# Patient Record
Sex: FEMALE | Race: WHITE | NOT HISPANIC OR LATINO | Employment: UNEMPLOYED | URBAN - METROPOLITAN AREA
[De-identification: names, ages, dates, MRNs, and addresses within clinical notes are randomized per-mention and may not be internally consistent; named-entity substitution may affect disease eponyms.]

---

## 2016-10-25 LAB — HBA1C MFR BLD: 9.3 % (ref 0–5.6)

## 2017-01-04 ENCOUNTER — COMMUNICATION - HEALTHEAST (OUTPATIENT)
Dept: SCHEDULING | Facility: CLINIC | Age: 42
End: 2017-01-04

## 2017-01-04 DIAGNOSIS — E78.00 PURE HYPERCHOLESTEROLEMIA: ICD-10-CM

## 2017-01-04 DIAGNOSIS — I10 HYPERTENSION: ICD-10-CM

## 2017-01-16 ENCOUNTER — RECORDS - HEALTHEAST (OUTPATIENT)
Dept: ADMINISTRATIVE | Facility: OTHER | Age: 42
End: 2017-01-16

## 2017-01-19 ENCOUNTER — RECORDS - HEALTHEAST (OUTPATIENT)
Dept: ADMINISTRATIVE | Facility: OTHER | Age: 42
End: 2017-01-19

## 2017-01-22 ENCOUNTER — RECORDS - HEALTHEAST (OUTPATIENT)
Dept: ADMINISTRATIVE | Facility: OTHER | Age: 42
End: 2017-01-22

## 2017-01-28 ENCOUNTER — COMMUNICATION - HEALTHEAST (OUTPATIENT)
Dept: FAMILY MEDICINE | Facility: CLINIC | Age: 42
End: 2017-01-28

## 2017-02-03 ENCOUNTER — COMMUNICATION - HEALTHEAST (OUTPATIENT)
Dept: FAMILY MEDICINE | Facility: CLINIC | Age: 42
End: 2017-02-03

## 2017-02-04 ENCOUNTER — COMMUNICATION - HEALTHEAST (OUTPATIENT)
Dept: SCHEDULING | Facility: CLINIC | Age: 42
End: 2017-02-04

## 2017-02-05 ENCOUNTER — RECORDS - HEALTHEAST (OUTPATIENT)
Dept: ADMINISTRATIVE | Facility: OTHER | Age: 42
End: 2017-02-05

## 2017-02-06 ENCOUNTER — COMMUNICATION - HEALTHEAST (OUTPATIENT)
Dept: FAMILY MEDICINE | Facility: CLINIC | Age: 42
End: 2017-02-06

## 2017-02-07 ENCOUNTER — COMMUNICATION - HEALTHEAST (OUTPATIENT)
Dept: FAMILY MEDICINE | Facility: CLINIC | Age: 42
End: 2017-02-07

## 2017-02-15 ENCOUNTER — OFFICE VISIT - HEALTHEAST (OUTPATIENT)
Dept: FAMILY MEDICINE | Facility: CLINIC | Age: 42
End: 2017-02-15

## 2017-02-15 DIAGNOSIS — E11.8 TYPE 2 DIABETES MELLITUS WITH COMPLICATION, WITH LONG-TERM CURRENT USE OF INSULIN (H): ICD-10-CM

## 2017-02-15 DIAGNOSIS — J30.9 ALLERGIC RHINITIS: ICD-10-CM

## 2017-02-15 DIAGNOSIS — M54.50 CHRONIC MIDLINE LOW BACK PAIN WITHOUT SCIATICA: ICD-10-CM

## 2017-02-15 DIAGNOSIS — F33.0 MAJOR DEPRESSIVE DISORDER, RECURRENT EPISODE, MILD (H): ICD-10-CM

## 2017-02-15 DIAGNOSIS — M17.0 OSTEOARTHRITIS OF BOTH KNEES, UNSPECIFIED OSTEOARTHRITIS TYPE: ICD-10-CM

## 2017-02-15 DIAGNOSIS — Z30.8 ENCOUNTER FOR OTHER CONTRACEPTIVE MANAGEMENT: ICD-10-CM

## 2017-02-15 DIAGNOSIS — Z79.4 TYPE 2 DIABETES MELLITUS WITH COMPLICATION, WITH LONG-TERM CURRENT USE OF INSULIN (H): ICD-10-CM

## 2017-02-15 DIAGNOSIS — E55.9 VITAMIN D DEFICIENCY: ICD-10-CM

## 2017-02-15 DIAGNOSIS — E66.01 MORBID OBESITY (H): ICD-10-CM

## 2017-02-15 DIAGNOSIS — G89.29 CHRONIC MIDLINE LOW BACK PAIN WITHOUT SCIATICA: ICD-10-CM

## 2017-02-15 DIAGNOSIS — G25.81 RESTLESS LEGS SYNDROME (RLS): ICD-10-CM

## 2017-02-15 LAB
HBA1C MFR BLD: 7.2 % (ref 3.5–6)
LDLC SERPL CALC-MCNC: 59 MG/DL

## 2017-02-16 ENCOUNTER — COMMUNICATION - HEALTHEAST (OUTPATIENT)
Dept: FAMILY MEDICINE | Facility: CLINIC | Age: 42
End: 2017-02-16

## 2017-02-17 ENCOUNTER — COMMUNICATION - HEALTHEAST (OUTPATIENT)
Dept: NURSING | Facility: CLINIC | Age: 42
End: 2017-02-17

## 2017-02-20 ENCOUNTER — COMMUNICATION - HEALTHEAST (OUTPATIENT)
Dept: FAMILY MEDICINE | Facility: CLINIC | Age: 42
End: 2017-02-20

## 2017-02-20 DIAGNOSIS — L50.9 URTICARIA, UNSPECIFIED: ICD-10-CM

## 2017-02-20 DIAGNOSIS — L28.0 LICHENIFICATION AND LICHEN SIMPLEX CHRONICUS: ICD-10-CM

## 2017-02-21 ENCOUNTER — COMMUNICATION - HEALTHEAST (OUTPATIENT)
Dept: FAMILY MEDICINE | Facility: CLINIC | Age: 42
End: 2017-02-21

## 2017-02-21 ENCOUNTER — RECORDS - HEALTHEAST (OUTPATIENT)
Dept: ADMINISTRATIVE | Facility: OTHER | Age: 42
End: 2017-02-21

## 2017-02-22 ENCOUNTER — COMMUNICATION - HEALTHEAST (OUTPATIENT)
Dept: FAMILY MEDICINE | Facility: CLINIC | Age: 42
End: 2017-02-22

## 2017-02-28 ENCOUNTER — RECORDS - HEALTHEAST (OUTPATIENT)
Dept: ADMINISTRATIVE | Facility: OTHER | Age: 42
End: 2017-02-28

## 2017-03-01 ENCOUNTER — COMMUNICATION - HEALTHEAST (OUTPATIENT)
Dept: FAMILY MEDICINE | Facility: CLINIC | Age: 42
End: 2017-03-01

## 2017-03-06 ENCOUNTER — COMMUNICATION - HEALTHEAST (OUTPATIENT)
Dept: FAMILY MEDICINE | Facility: CLINIC | Age: 42
End: 2017-03-06

## 2017-03-06 DIAGNOSIS — F33.1 MAJOR DEPRESSIVE DISORDER, RECURRENT EPISODE, MODERATE (H): ICD-10-CM

## 2017-03-06 DIAGNOSIS — G25.81 RESTLESS LEGS: ICD-10-CM

## 2017-03-20 ENCOUNTER — COMMUNICATION - HEALTHEAST (OUTPATIENT)
Dept: FAMILY MEDICINE | Facility: CLINIC | Age: 42
End: 2017-03-20

## 2017-03-20 DIAGNOSIS — E11.8 TYPE 2 DIABETES MELLITUS WITH COMPLICATION, WITH LONG-TERM CURRENT USE OF INSULIN (H): ICD-10-CM

## 2017-03-20 DIAGNOSIS — Z79.4 TYPE 2 DIABETES MELLITUS WITH COMPLICATION, WITH LONG-TERM CURRENT USE OF INSULIN (H): ICD-10-CM

## 2017-03-23 ENCOUNTER — COMMUNICATION - HEALTHEAST (OUTPATIENT)
Dept: FAMILY MEDICINE | Facility: CLINIC | Age: 42
End: 2017-03-23

## 2017-03-23 DIAGNOSIS — F33.0 MAJOR DEPRESSIVE DISORDER, RECURRENT EPISODE, MILD (H): ICD-10-CM

## 2017-03-29 ENCOUNTER — COMMUNICATION - HEALTHEAST (OUTPATIENT)
Dept: FAMILY MEDICINE | Facility: CLINIC | Age: 42
End: 2017-03-29

## 2017-03-29 ENCOUNTER — RECORDS - HEALTHEAST (OUTPATIENT)
Dept: ADMINISTRATIVE | Facility: OTHER | Age: 42
End: 2017-03-29

## 2017-03-29 ENCOUNTER — OFFICE VISIT - HEALTHEAST (OUTPATIENT)
Dept: FAMILY MEDICINE | Facility: CLINIC | Age: 42
End: 2017-03-29

## 2017-03-29 DIAGNOSIS — Z79.4 TYPE 2 DIABETES MELLITUS WITH COMPLICATION, WITH LONG-TERM CURRENT USE OF INSULIN (H): ICD-10-CM

## 2017-03-29 DIAGNOSIS — I87.8 VENOUS STASIS: ICD-10-CM

## 2017-03-29 DIAGNOSIS — E66.01 MORBID OBESITY WITH BMI OF 45.0-49.9, ADULT (H): ICD-10-CM

## 2017-03-29 DIAGNOSIS — G25.81 RESTLESS LEGS SYNDROME (RLS): ICD-10-CM

## 2017-03-29 DIAGNOSIS — F33.0 MAJOR DEPRESSIVE DISORDER, RECURRENT EPISODE, MILD (H): ICD-10-CM

## 2017-03-29 DIAGNOSIS — E11.8 TYPE 2 DIABETES MELLITUS WITH COMPLICATION, WITH LONG-TERM CURRENT USE OF INSULIN (H): ICD-10-CM

## 2017-04-01 ENCOUNTER — COMMUNICATION - HEALTHEAST (OUTPATIENT)
Dept: FAMILY MEDICINE | Facility: CLINIC | Age: 42
End: 2017-04-01

## 2017-04-08 ENCOUNTER — COMMUNICATION - HEALTHEAST (OUTPATIENT)
Dept: SCHEDULING | Facility: CLINIC | Age: 42
End: 2017-04-08

## 2017-04-08 DIAGNOSIS — E11.8 TYPE 2 DIABETES MELLITUS WITH COMPLICATION, WITH LONG-TERM CURRENT USE OF INSULIN (H): ICD-10-CM

## 2017-04-08 DIAGNOSIS — Z79.4 TYPE 2 DIABETES MELLITUS WITH COMPLICATION, WITH LONG-TERM CURRENT USE OF INSULIN (H): ICD-10-CM

## 2017-04-08 DIAGNOSIS — F33.0 MAJOR DEPRESSIVE DISORDER, RECURRENT EPISODE, MILD (H): ICD-10-CM

## 2017-04-09 ENCOUNTER — COMMUNICATION - HEALTHEAST (OUTPATIENT)
Dept: FAMILY MEDICINE | Facility: CLINIC | Age: 42
End: 2017-04-09

## 2017-05-17 ENCOUNTER — RECORDS - HEALTHEAST (OUTPATIENT)
Dept: ADMINISTRATIVE | Facility: OTHER | Age: 42
End: 2017-05-17

## 2017-05-18 ENCOUNTER — RECORDS - HEALTHEAST (OUTPATIENT)
Dept: ADMINISTRATIVE | Facility: OTHER | Age: 42
End: 2017-05-18

## 2017-05-21 ENCOUNTER — COMMUNICATION - HEALTHEAST (OUTPATIENT)
Dept: FAMILY MEDICINE | Facility: CLINIC | Age: 42
End: 2017-05-21

## 2017-05-21 DIAGNOSIS — E11.8 TYPE 2 DIABETES MELLITUS WITH COMPLICATION, WITH LONG-TERM CURRENT USE OF INSULIN (H): ICD-10-CM

## 2017-05-21 DIAGNOSIS — Z79.4 TYPE 2 DIABETES MELLITUS WITH COMPLICATION, WITH LONG-TERM CURRENT USE OF INSULIN (H): ICD-10-CM

## 2017-05-21 DIAGNOSIS — G25.81 RESTLESS LEGS SYNDROME (RLS): ICD-10-CM

## 2017-05-22 ENCOUNTER — COMMUNICATION - HEALTHEAST (OUTPATIENT)
Dept: FAMILY MEDICINE | Facility: CLINIC | Age: 42
End: 2017-05-22

## 2017-05-24 ENCOUNTER — RECORDS - HEALTHEAST (OUTPATIENT)
Dept: ADMINISTRATIVE | Facility: OTHER | Age: 42
End: 2017-05-24

## 2017-06-01 ENCOUNTER — COMMUNICATION - HEALTHEAST (OUTPATIENT)
Dept: FAMILY MEDICINE | Facility: CLINIC | Age: 42
End: 2017-06-01

## 2017-06-28 ENCOUNTER — COMMUNICATION - HEALTHEAST (OUTPATIENT)
Dept: FAMILY MEDICINE | Facility: CLINIC | Age: 42
End: 2017-06-28

## 2017-07-05 ENCOUNTER — OFFICE VISIT - HEALTHEAST (OUTPATIENT)
Dept: NURSING | Facility: CLINIC | Age: 42
End: 2017-07-05

## 2017-07-05 ENCOUNTER — OFFICE VISIT - HEALTHEAST (OUTPATIENT)
Dept: FAMILY MEDICINE | Facility: CLINIC | Age: 42
End: 2017-07-05

## 2017-07-05 DIAGNOSIS — F70 MILD INTELLECTUAL DISABILITIES: ICD-10-CM

## 2017-07-05 DIAGNOSIS — E11.8 TYPE 2 DIABETES MELLITUS WITH COMPLICATION, WITH LONG-TERM CURRENT USE OF INSULIN (H): ICD-10-CM

## 2017-07-05 DIAGNOSIS — Z79.4 TYPE 2 DIABETES MELLITUS WITH COMPLICATION, WITH LONG-TERM CURRENT USE OF INSULIN (H): ICD-10-CM

## 2017-07-05 DIAGNOSIS — G25.81 RESTLESS LEGS SYNDROME (RLS): ICD-10-CM

## 2017-07-05 DIAGNOSIS — E55.9 VITAMIN D DEFICIENCY: ICD-10-CM

## 2017-07-05 DIAGNOSIS — E78.00 PURE HYPERCHOLESTEROLEMIA: ICD-10-CM

## 2017-07-05 DIAGNOSIS — Z79.899 MEDICATION MANAGEMENT: ICD-10-CM

## 2017-07-05 DIAGNOSIS — F33.0 MAJOR DEPRESSIVE DISORDER, RECURRENT EPISODE, MILD (H): ICD-10-CM

## 2017-07-05 DIAGNOSIS — E66.01 MORBID OBESITY WITH BMI OF 45.0-49.9, ADULT (H): ICD-10-CM

## 2017-07-05 DIAGNOSIS — J30.9 ALLERGIC RHINITIS: ICD-10-CM

## 2017-07-05 DIAGNOSIS — E83.42 HYPOMAGNESEMIA: ICD-10-CM

## 2017-07-05 DIAGNOSIS — K21.00 REFLUX ESOPHAGITIS: ICD-10-CM

## 2017-07-05 DIAGNOSIS — D50.9 IRON DEFICIENCY ANEMIA, UNSPECIFIED: ICD-10-CM

## 2017-07-05 LAB — HBA1C MFR BLD: 8.1 % (ref 3.5–6)

## 2017-07-06 ENCOUNTER — COMMUNICATION - HEALTHEAST (OUTPATIENT)
Dept: FAMILY MEDICINE | Facility: CLINIC | Age: 42
End: 2017-07-06

## 2017-07-06 DIAGNOSIS — D50.9 IRON DEFICIENCY ANEMIA, UNSPECIFIED: ICD-10-CM

## 2017-07-06 DIAGNOSIS — E83.42 HYPOMAGNESEMIA: ICD-10-CM

## 2017-07-28 ENCOUNTER — COMMUNICATION - HEALTHEAST (OUTPATIENT)
Dept: FAMILY MEDICINE | Facility: CLINIC | Age: 42
End: 2017-07-28

## 2017-08-10 ENCOUNTER — COMMUNICATION - HEALTHEAST (OUTPATIENT)
Dept: FAMILY MEDICINE | Facility: CLINIC | Age: 42
End: 2017-08-10

## 2017-08-10 DIAGNOSIS — I10 HYPERTENSION: ICD-10-CM

## 2017-08-16 ENCOUNTER — RECORDS - HEALTHEAST (OUTPATIENT)
Dept: GENERAL RADIOLOGY | Facility: CLINIC | Age: 42
End: 2017-08-16

## 2017-08-16 ENCOUNTER — OFFICE VISIT - HEALTHEAST (OUTPATIENT)
Dept: FAMILY MEDICINE | Facility: CLINIC | Age: 42
End: 2017-08-16

## 2017-08-16 DIAGNOSIS — M79.675 GREAT TOE PAIN, LEFT: ICD-10-CM

## 2017-08-16 DIAGNOSIS — E11.8 TYPE 2 DIABETES MELLITUS WITH COMPLICATION, WITHOUT LONG-TERM CURRENT USE OF INSULIN (H): ICD-10-CM

## 2017-08-16 DIAGNOSIS — F33.0 MAJOR DEPRESSIVE DISORDER, RECURRENT EPISODE, MILD (H): ICD-10-CM

## 2017-08-16 DIAGNOSIS — E55.9 VITAMIN D DEFICIENCY: ICD-10-CM

## 2017-08-16 DIAGNOSIS — J30.89 PERENNIAL ALLERGIC RHINITIS, UNSPECIFIED ALLERGIC RHINITIS TRIGGER: ICD-10-CM

## 2017-08-16 DIAGNOSIS — M79.675 PAIN IN LEFT TOE(S): ICD-10-CM

## 2017-08-17 ENCOUNTER — COMMUNICATION - HEALTHEAST (OUTPATIENT)
Dept: FAMILY MEDICINE | Facility: CLINIC | Age: 42
End: 2017-08-17

## 2017-08-17 DIAGNOSIS — M79.675 GREAT TOE PAIN, LEFT: ICD-10-CM

## 2017-09-01 ENCOUNTER — COMMUNICATION - HEALTHEAST (OUTPATIENT)
Dept: FAMILY MEDICINE | Facility: CLINIC | Age: 42
End: 2017-09-01

## 2017-09-06 ENCOUNTER — RECORDS - HEALTHEAST (OUTPATIENT)
Dept: ADMINISTRATIVE | Facility: OTHER | Age: 42
End: 2017-09-06

## 2017-09-06 ENCOUNTER — COMMUNICATION - HEALTHEAST (OUTPATIENT)
Dept: FAMILY MEDICINE | Facility: CLINIC | Age: 42
End: 2017-09-06

## 2017-09-20 ENCOUNTER — OFFICE VISIT - HEALTHEAST (OUTPATIENT)
Dept: PODIATRY | Age: 42
End: 2017-09-20

## 2017-09-20 DIAGNOSIS — S90.212D CONTUSION OF LEFT GREAT TOE WITH DAMAGE TO NAIL, SUBSEQUENT ENCOUNTER: ICD-10-CM

## 2017-09-20 DIAGNOSIS — E11.8 TYPE 2 DIABETES MELLITUS WITH COMPLICATION, WITHOUT LONG-TERM CURRENT USE OF INSULIN (H): ICD-10-CM

## 2017-10-03 ENCOUNTER — COMMUNICATION - HEALTHEAST (OUTPATIENT)
Dept: FAMILY MEDICINE | Facility: CLINIC | Age: 42
End: 2017-10-03

## 2017-10-03 DIAGNOSIS — G25.81 RESTLESS LEGS SYNDROME (RLS): ICD-10-CM

## 2017-10-04 ENCOUNTER — COMMUNICATION - HEALTHEAST (OUTPATIENT)
Dept: FAMILY MEDICINE | Facility: CLINIC | Age: 42
End: 2017-10-04

## 2017-10-23 ENCOUNTER — COMMUNICATION - HEALTHEAST (OUTPATIENT)
Dept: FAMILY MEDICINE | Facility: CLINIC | Age: 42
End: 2017-10-23

## 2017-10-23 DIAGNOSIS — G25.81 RESTLESS LEGS SYNDROME (RLS): ICD-10-CM

## 2017-12-07 ENCOUNTER — COMMUNICATION - HEALTHEAST (OUTPATIENT)
Dept: FAMILY MEDICINE | Facility: CLINIC | Age: 42
End: 2017-12-07

## 2018-01-07 ENCOUNTER — COMMUNICATION - HEALTHEAST (OUTPATIENT)
Dept: FAMILY MEDICINE | Facility: CLINIC | Age: 43
End: 2018-01-07

## 2018-01-31 ENCOUNTER — OFFICE VISIT - HEALTHEAST (OUTPATIENT)
Dept: FAMILY MEDICINE | Facility: CLINIC | Age: 43
End: 2018-01-31

## 2018-01-31 DIAGNOSIS — E11.8 TYPE 2 DIABETES MELLITUS WITH COMPLICATION, WITHOUT LONG-TERM CURRENT USE OF INSULIN (H): ICD-10-CM

## 2018-01-31 DIAGNOSIS — R21 RASH, SKIN: ICD-10-CM

## 2018-01-31 DIAGNOSIS — K21.00 REFLUX ESOPHAGITIS: ICD-10-CM

## 2018-01-31 DIAGNOSIS — G25.81 RESTLESS LEGS SYNDROME (RLS): ICD-10-CM

## 2018-01-31 DIAGNOSIS — E83.42 HYPOMAGNESEMIA: ICD-10-CM

## 2018-01-31 DIAGNOSIS — E66.01 MORBID OBESITY WITH BMI OF 45.0-49.9, ADULT (H): ICD-10-CM

## 2018-01-31 DIAGNOSIS — E78.00 PURE HYPERCHOLESTEROLEMIA: ICD-10-CM

## 2018-01-31 DIAGNOSIS — I87.8 VENOUS STASIS: ICD-10-CM

## 2018-01-31 DIAGNOSIS — E11.319 DIABETIC RETINOPATHY (H): ICD-10-CM

## 2018-01-31 DIAGNOSIS — F33.0 MAJOR DEPRESSIVE DISORDER, RECURRENT EPISODE, MILD (H): ICD-10-CM

## 2018-01-31 LAB
LDLC SERPL CALC-MCNC: 122 MG/DL
MAGNESIUM SERPL-MCNC: 1.4 MG/DL (ref 1.8–2.6)

## 2018-01-31 ASSESSMENT — MIFFLIN-ST. JEOR: SCORE: 1887.81

## 2018-02-04 ENCOUNTER — COMMUNICATION - HEALTHEAST (OUTPATIENT)
Dept: FAMILY MEDICINE | Facility: CLINIC | Age: 43
End: 2018-02-04

## 2018-02-07 ENCOUNTER — COMMUNICATION - HEALTHEAST (OUTPATIENT)
Dept: FAMILY MEDICINE | Facility: CLINIC | Age: 43
End: 2018-02-07

## 2018-02-15 ENCOUNTER — COMMUNICATION - HEALTHEAST (OUTPATIENT)
Dept: FAMILY MEDICINE | Facility: CLINIC | Age: 43
End: 2018-02-15

## 2018-02-15 DIAGNOSIS — F33.0 MAJOR DEPRESSIVE DISORDER, RECURRENT EPISODE, MILD (H): ICD-10-CM

## 2018-03-05 ENCOUNTER — COMMUNICATION - HEALTHEAST (OUTPATIENT)
Dept: SCHEDULING | Facility: CLINIC | Age: 43
End: 2018-03-05

## 2018-03-05 DIAGNOSIS — F33.0 MAJOR DEPRESSIVE DISORDER, RECURRENT EPISODE, MILD (H): ICD-10-CM

## 2018-03-11 ENCOUNTER — COMMUNICATION - HEALTHEAST (OUTPATIENT)
Dept: FAMILY MEDICINE | Facility: CLINIC | Age: 43
End: 2018-03-11

## 2018-03-11 DIAGNOSIS — Z79.4 TYPE 2 DIABETES MELLITUS WITH COMPLICATION, WITH LONG-TERM CURRENT USE OF INSULIN (H): ICD-10-CM

## 2018-03-11 DIAGNOSIS — E11.8 TYPE 2 DIABETES MELLITUS WITH COMPLICATION, WITH LONG-TERM CURRENT USE OF INSULIN (H): ICD-10-CM

## 2018-03-12 ENCOUNTER — COMMUNICATION - HEALTHEAST (OUTPATIENT)
Dept: FAMILY MEDICINE | Facility: CLINIC | Age: 43
End: 2018-03-12

## 2018-03-12 DIAGNOSIS — F33.0 MAJOR DEPRESSIVE DISORDER, RECURRENT EPISODE, MILD (H): ICD-10-CM

## 2018-03-21 ENCOUNTER — COMMUNICATION - HEALTHEAST (OUTPATIENT)
Dept: FAMILY MEDICINE | Facility: CLINIC | Age: 43
End: 2018-03-21

## 2018-03-23 ENCOUNTER — RECORDS - HEALTHEAST (OUTPATIENT)
Dept: ADMINISTRATIVE | Facility: OTHER | Age: 43
End: 2018-03-23

## 2018-04-18 ENCOUNTER — COMMUNICATION - HEALTHEAST (OUTPATIENT)
Dept: FAMILY MEDICINE | Facility: CLINIC | Age: 43
End: 2018-04-18

## 2018-04-18 DIAGNOSIS — E11.8 TYPE 2 DIABETES MELLITUS WITH COMPLICATION, WITH LONG-TERM CURRENT USE OF INSULIN (H): ICD-10-CM

## 2018-04-18 DIAGNOSIS — Z79.4 TYPE 2 DIABETES MELLITUS WITH COMPLICATION, WITH LONG-TERM CURRENT USE OF INSULIN (H): ICD-10-CM

## 2018-04-26 ENCOUNTER — COMMUNICATION - HEALTHEAST (OUTPATIENT)
Dept: FAMILY MEDICINE | Facility: CLINIC | Age: 43
End: 2018-04-26

## 2018-04-30 ENCOUNTER — RECORDS - HEALTHEAST (OUTPATIENT)
Dept: ADMINISTRATIVE | Facility: OTHER | Age: 43
End: 2018-04-30

## 2018-05-06 ENCOUNTER — COMMUNICATION - HEALTHEAST (OUTPATIENT)
Dept: SCHEDULING | Facility: CLINIC | Age: 43
End: 2018-05-06

## 2018-05-06 ENCOUNTER — COMMUNICATION - HEALTHEAST (OUTPATIENT)
Dept: FAMILY MEDICINE | Facility: CLINIC | Age: 43
End: 2018-05-06

## 2018-05-06 DIAGNOSIS — F33.0 MAJOR DEPRESSIVE DISORDER, RECURRENT EPISODE, MILD (H): ICD-10-CM

## 2018-05-21 ENCOUNTER — COMMUNICATION - HEALTHEAST (OUTPATIENT)
Dept: FAMILY MEDICINE | Facility: CLINIC | Age: 43
End: 2018-05-21

## 2018-05-21 DIAGNOSIS — Z79.4 TYPE 2 DIABETES MELLITUS WITH COMPLICATION, WITH LONG-TERM CURRENT USE OF INSULIN (H): ICD-10-CM

## 2018-05-21 DIAGNOSIS — E11.8 TYPE 2 DIABETES MELLITUS WITH COMPLICATION, WITH LONG-TERM CURRENT USE OF INSULIN (H): ICD-10-CM

## 2018-05-25 ENCOUNTER — RECORDS - HEALTHEAST (OUTPATIENT)
Dept: ADMINISTRATIVE | Facility: OTHER | Age: 43
End: 2018-05-25

## 2018-05-25 ENCOUNTER — COMMUNICATION - HEALTHEAST (OUTPATIENT)
Dept: FAMILY MEDICINE | Facility: CLINIC | Age: 43
End: 2018-05-25

## 2018-05-30 ENCOUNTER — OFFICE VISIT - HEALTHEAST (OUTPATIENT)
Dept: FAMILY MEDICINE | Facility: CLINIC | Age: 43
End: 2018-05-30

## 2018-05-30 ENCOUNTER — RECORDS - HEALTHEAST (OUTPATIENT)
Dept: GENERAL RADIOLOGY | Facility: CLINIC | Age: 43
End: 2018-05-30

## 2018-05-30 DIAGNOSIS — S99.912A UNSPECIFIED INJURY OF LEFT ANKLE, INITIAL ENCOUNTER: ICD-10-CM

## 2018-05-30 DIAGNOSIS — S99.912A ANKLE INJURY, LEFT, INITIAL ENCOUNTER: ICD-10-CM

## 2018-05-31 ENCOUNTER — COMMUNICATION - HEALTHEAST (OUTPATIENT)
Dept: FAMILY MEDICINE | Facility: CLINIC | Age: 43
End: 2018-05-31

## 2018-06-01 ENCOUNTER — COMMUNICATION - HEALTHEAST (OUTPATIENT)
Dept: FAMILY MEDICINE | Facility: CLINIC | Age: 43
End: 2018-06-01

## 2018-06-01 DIAGNOSIS — Z79.4 TYPE 2 DIABETES MELLITUS WITH COMPLICATION, WITH LONG-TERM CURRENT USE OF INSULIN (H): ICD-10-CM

## 2018-06-01 DIAGNOSIS — E11.8 TYPE 2 DIABETES MELLITUS WITH COMPLICATION, WITH LONG-TERM CURRENT USE OF INSULIN (H): ICD-10-CM

## 2018-06-04 RX ORDER — PEN NEEDLE, DIABETIC 32GX 5/32"
NEEDLE, DISPOSABLE MISCELLANEOUS
Qty: 300 EACH | Refills: 0 | Status: SHIPPED | OUTPATIENT
Start: 2018-06-04

## 2018-06-12 ENCOUNTER — COMMUNICATION - HEALTHEAST (OUTPATIENT)
Dept: FAMILY MEDICINE | Facility: CLINIC | Age: 43
End: 2018-06-12

## 2018-06-28 ENCOUNTER — COMMUNICATION - HEALTHEAST (OUTPATIENT)
Dept: FAMILY MEDICINE | Facility: CLINIC | Age: 43
End: 2018-06-28

## 2018-07-06 ENCOUNTER — RECORDS - HEALTHEAST (OUTPATIENT)
Dept: ADMINISTRATIVE | Facility: OTHER | Age: 43
End: 2018-07-06

## 2018-07-29 ENCOUNTER — COMMUNICATION - HEALTHEAST (OUTPATIENT)
Dept: FAMILY MEDICINE | Facility: CLINIC | Age: 43
End: 2018-07-29

## 2018-07-31 ENCOUNTER — RECORDS - HEALTHEAST (OUTPATIENT)
Dept: ADMINISTRATIVE | Facility: OTHER | Age: 43
End: 2018-07-31

## 2018-08-20 ENCOUNTER — COMMUNICATION - HEALTHEAST (OUTPATIENT)
Dept: FAMILY MEDICINE | Facility: CLINIC | Age: 43
End: 2018-08-20

## 2018-08-20 DIAGNOSIS — J30.89 PERENNIAL ALLERGIC RHINITIS: ICD-10-CM

## 2018-08-23 ENCOUNTER — RECORDS - HEALTHEAST (OUTPATIENT)
Dept: ADMINISTRATIVE | Facility: OTHER | Age: 43
End: 2018-08-23

## 2018-08-23 LAB — RETINOPATHY: NEGATIVE

## 2018-08-24 ENCOUNTER — COMMUNICATION - HEALTHEAST (OUTPATIENT)
Dept: FAMILY MEDICINE | Facility: CLINIC | Age: 43
End: 2018-08-24

## 2018-08-24 DIAGNOSIS — E55.9 VITAMIN D DEFICIENCY: ICD-10-CM

## 2018-08-27 ENCOUNTER — COMMUNICATION - HEALTHEAST (OUTPATIENT)
Dept: SCHEDULING | Facility: CLINIC | Age: 43
End: 2018-08-27

## 2018-09-18 ENCOUNTER — RECORDS - HEALTHEAST (OUTPATIENT)
Dept: GENERAL RADIOLOGY | Facility: CLINIC | Age: 43
End: 2018-09-18

## 2018-09-18 ENCOUNTER — OFFICE VISIT - HEALTHEAST (OUTPATIENT)
Dept: FAMILY MEDICINE | Facility: CLINIC | Age: 43
End: 2018-09-18

## 2018-09-18 DIAGNOSIS — M72.2 PLANTAR FASCIITIS OF RIGHT FOOT: ICD-10-CM

## 2018-09-18 DIAGNOSIS — S82.409A CLOSED FIBULAR FRACTURE: ICD-10-CM

## 2018-09-18 DIAGNOSIS — E11.8 TYPE 2 DIABETES MELLITUS WITH COMPLICATION, WITHOUT LONG-TERM CURRENT USE OF INSULIN (H): ICD-10-CM

## 2018-09-18 DIAGNOSIS — E83.42 HYPOMAGNESEMIA: ICD-10-CM

## 2018-09-18 DIAGNOSIS — E78.00 PURE HYPERCHOLESTEROLEMIA: ICD-10-CM

## 2018-09-18 DIAGNOSIS — E55.9 VITAMIN D DEFICIENCY: ICD-10-CM

## 2018-09-18 DIAGNOSIS — F70 MILD INTELLECTUAL DISABILITY: ICD-10-CM

## 2018-09-18 DIAGNOSIS — E11.42 DIABETIC POLYNEUROPATHY ASSOCIATED WITH TYPE 2 DIABETES MELLITUS (H): ICD-10-CM

## 2018-09-18 DIAGNOSIS — S82.409A UNSPECIFIED FRACTURE OF SHAFT OF UNSPECIFIED FIBULA, INITIAL ENCOUNTER FOR CLOSED FRACTURE: ICD-10-CM

## 2018-09-18 DIAGNOSIS — E66.01 MORBID OBESITY WITH BMI OF 45.0-49.9, ADULT (H): ICD-10-CM

## 2018-09-18 LAB
ALBUMIN SERPL-MCNC: 2.9 G/DL (ref 3.5–5)
ALP SERPL-CCNC: 89 U/L (ref 45–120)
ALT SERPL W P-5'-P-CCNC: 13 U/L (ref 0–45)
ANION GAP SERPL CALCULATED.3IONS-SCNC: 12 MMOL/L (ref 5–18)
AST SERPL W P-5'-P-CCNC: 11 U/L (ref 0–40)
BILIRUB SERPL-MCNC: 0.5 MG/DL (ref 0–1)
BUN SERPL-MCNC: 13 MG/DL (ref 8–22)
CALCIUM SERPL-MCNC: 8.7 MG/DL (ref 8.5–10.5)
CHLORIDE BLD-SCNC: 101 MMOL/L (ref 98–107)
CO2 SERPL-SCNC: 21 MMOL/L (ref 22–31)
CREAT SERPL-MCNC: 0.7 MG/DL (ref 0.6–1.1)
GFR SERPL CREATININE-BSD FRML MDRD: >60 ML/MIN/1.73M2
GLUCOSE BLD-MCNC: 351 MG/DL (ref 70–125)
HBA1C MFR BLD: 8.8 % (ref 3.5–6)
MAGNESIUM SERPL-MCNC: 1.8 MG/DL (ref 1.8–2.6)
POTASSIUM BLD-SCNC: 4.3 MMOL/L (ref 3.5–5)
PROT SERPL-MCNC: 7.4 G/DL (ref 6–8)
SODIUM SERPL-SCNC: 134 MMOL/L (ref 136–145)
VIT B12 SERPL-MCNC: 352 PG/ML (ref 213–816)

## 2018-09-18 ASSESSMENT — MIFFLIN-ST. JEOR: SCORE: 2019.35

## 2018-09-19 ENCOUNTER — RECORDS - HEALTHEAST (OUTPATIENT)
Dept: ADMINISTRATIVE | Facility: OTHER | Age: 43
End: 2018-09-19

## 2018-09-19 LAB — 25(OH)D3 SERPL-MCNC: 20.1 NG/ML (ref 30–80)

## 2018-09-21 ENCOUNTER — COMMUNICATION - HEALTHEAST (OUTPATIENT)
Dept: FAMILY MEDICINE | Facility: CLINIC | Age: 43
End: 2018-09-21

## 2018-09-24 ENCOUNTER — COMMUNICATION - HEALTHEAST (OUTPATIENT)
Dept: FAMILY MEDICINE | Facility: CLINIC | Age: 43
End: 2018-09-24

## 2018-09-25 ENCOUNTER — COMMUNICATION - HEALTHEAST (OUTPATIENT)
Dept: FAMILY MEDICINE | Facility: CLINIC | Age: 43
End: 2018-09-25

## 2018-09-26 ENCOUNTER — OFFICE VISIT - HEALTHEAST (OUTPATIENT)
Dept: PHYSICAL THERAPY | Facility: REHABILITATION | Age: 43
End: 2018-09-26

## 2018-09-26 ENCOUNTER — COMMUNICATION - HEALTHEAST (OUTPATIENT)
Dept: FAMILY MEDICINE | Facility: CLINIC | Age: 43
End: 2018-09-26

## 2018-09-26 DIAGNOSIS — R26.81 UNSTEADY GAIT: ICD-10-CM

## 2018-09-26 DIAGNOSIS — M72.2 PLANTAR FASCIITIS: ICD-10-CM

## 2018-09-26 DIAGNOSIS — M62.81 GENERALIZED MUSCLE WEAKNESS: ICD-10-CM

## 2018-09-26 DIAGNOSIS — S82.832A CLOSED FRACTURE OF DISTAL END OF LEFT FIBULA, UNSPECIFIED FRACTURE MORPHOLOGY, INITIAL ENCOUNTER: ICD-10-CM

## 2018-09-26 DIAGNOSIS — S82.409A CLOSED FIBULAR FRACTURE: ICD-10-CM

## 2018-09-28 ENCOUNTER — COMMUNICATION - HEALTHEAST (OUTPATIENT)
Dept: FAMILY MEDICINE | Facility: CLINIC | Age: 43
End: 2018-09-28

## 2018-10-01 ENCOUNTER — COMMUNICATION - HEALTHEAST (OUTPATIENT)
Dept: NURSING | Facility: CLINIC | Age: 43
End: 2018-10-01

## 2018-10-02 ENCOUNTER — OFFICE VISIT - HEALTHEAST (OUTPATIENT)
Dept: PHYSICAL THERAPY | Facility: REHABILITATION | Age: 43
End: 2018-10-02

## 2018-10-02 ENCOUNTER — RECORDS - HEALTHEAST (OUTPATIENT)
Dept: ADMINISTRATIVE | Facility: OTHER | Age: 43
End: 2018-10-02

## 2018-10-02 DIAGNOSIS — S82.832A CLOSED FRACTURE OF DISTAL END OF LEFT FIBULA, UNSPECIFIED FRACTURE MORPHOLOGY, INITIAL ENCOUNTER: ICD-10-CM

## 2018-10-02 DIAGNOSIS — R26.81 UNSTEADY GAIT: ICD-10-CM

## 2018-10-02 DIAGNOSIS — M72.2 PLANTAR FASCIITIS: ICD-10-CM

## 2018-10-02 DIAGNOSIS — M62.81 GENERALIZED MUSCLE WEAKNESS: ICD-10-CM

## 2018-10-05 ENCOUNTER — COMMUNICATION - HEALTHEAST (OUTPATIENT)
Dept: FAMILY MEDICINE | Facility: CLINIC | Age: 43
End: 2018-10-05

## 2018-10-06 ENCOUNTER — COMMUNICATION - HEALTHEAST (OUTPATIENT)
Dept: FAMILY MEDICINE | Facility: CLINIC | Age: 43
End: 2018-10-06

## 2018-10-06 DIAGNOSIS — G25.81 RESTLESS LEGS SYNDROME (RLS): ICD-10-CM

## 2018-10-09 ENCOUNTER — OFFICE VISIT - HEALTHEAST (OUTPATIENT)
Dept: PHYSICAL THERAPY | Facility: REHABILITATION | Age: 43
End: 2018-10-09

## 2018-10-09 DIAGNOSIS — S82.832A CLOSED FRACTURE OF DISTAL END OF LEFT FIBULA, UNSPECIFIED FRACTURE MORPHOLOGY, INITIAL ENCOUNTER: ICD-10-CM

## 2018-10-09 DIAGNOSIS — M72.2 PLANTAR FASCIITIS: ICD-10-CM

## 2018-10-09 DIAGNOSIS — M62.81 GENERALIZED MUSCLE WEAKNESS: ICD-10-CM

## 2018-10-09 DIAGNOSIS — R26.81 UNSTEADY GAIT: ICD-10-CM

## 2018-10-11 ENCOUNTER — OFFICE VISIT - HEALTHEAST (OUTPATIENT)
Dept: PHYSICAL THERAPY | Facility: REHABILITATION | Age: 43
End: 2018-10-11

## 2018-10-11 DIAGNOSIS — S82.832A CLOSED FRACTURE OF DISTAL END OF LEFT FIBULA, UNSPECIFIED FRACTURE MORPHOLOGY, INITIAL ENCOUNTER: ICD-10-CM

## 2018-10-11 DIAGNOSIS — M72.2 PLANTAR FASCIITIS: ICD-10-CM

## 2018-10-11 DIAGNOSIS — R26.81 UNSTEADY GAIT: ICD-10-CM

## 2018-10-11 DIAGNOSIS — M62.81 GENERALIZED MUSCLE WEAKNESS: ICD-10-CM

## 2018-10-12 ENCOUNTER — COMMUNICATION - HEALTHEAST (OUTPATIENT)
Dept: NURSING | Facility: CLINIC | Age: 43
End: 2018-10-12

## 2018-10-18 ENCOUNTER — OFFICE VISIT - HEALTHEAST (OUTPATIENT)
Dept: PHYSICAL THERAPY | Facility: REHABILITATION | Age: 43
End: 2018-10-18

## 2018-10-18 DIAGNOSIS — R26.81 UNSTEADY GAIT: ICD-10-CM

## 2018-10-18 DIAGNOSIS — M62.81 GENERALIZED MUSCLE WEAKNESS: ICD-10-CM

## 2018-10-18 DIAGNOSIS — S82.832A CLOSED FRACTURE OF DISTAL END OF LEFT FIBULA, UNSPECIFIED FRACTURE MORPHOLOGY, INITIAL ENCOUNTER: ICD-10-CM

## 2018-10-18 DIAGNOSIS — M72.2 PLANTAR FASCIITIS: ICD-10-CM

## 2018-10-23 ENCOUNTER — OFFICE VISIT - HEALTHEAST (OUTPATIENT)
Dept: PHYSICAL THERAPY | Facility: REHABILITATION | Age: 43
End: 2018-10-23

## 2018-10-23 DIAGNOSIS — S82.832A CLOSED FRACTURE OF DISTAL END OF LEFT FIBULA, UNSPECIFIED FRACTURE MORPHOLOGY, INITIAL ENCOUNTER: ICD-10-CM

## 2018-10-23 DIAGNOSIS — R26.81 UNSTEADY GAIT: ICD-10-CM

## 2018-10-23 DIAGNOSIS — M72.2 PLANTAR FASCIITIS: ICD-10-CM

## 2018-10-23 DIAGNOSIS — M62.81 GENERALIZED MUSCLE WEAKNESS: ICD-10-CM

## 2018-10-28 ENCOUNTER — COMMUNICATION - HEALTHEAST (OUTPATIENT)
Dept: FAMILY MEDICINE | Facility: CLINIC | Age: 43
End: 2018-10-28

## 2018-10-30 ENCOUNTER — RECORDS - HEALTHEAST (OUTPATIENT)
Dept: GENERAL RADIOLOGY | Facility: CLINIC | Age: 43
End: 2018-10-30

## 2018-10-30 ENCOUNTER — RECORDS - HEALTHEAST (OUTPATIENT)
Dept: MAMMOGRAPHY | Facility: CLINIC | Age: 43
End: 2018-10-30

## 2018-10-30 ENCOUNTER — OFFICE VISIT - HEALTHEAST (OUTPATIENT)
Dept: FAMILY MEDICINE | Facility: CLINIC | Age: 43
End: 2018-10-30

## 2018-10-30 DIAGNOSIS — L29.9 ITCHING: ICD-10-CM

## 2018-10-30 DIAGNOSIS — Z12.31 ENCOUNTER FOR SCREENING MAMMOGRAM FOR MALIGNANT NEOPLASM OF BREAST: ICD-10-CM

## 2018-10-30 DIAGNOSIS — E83.42 HYPOMAGNESEMIA: ICD-10-CM

## 2018-10-30 DIAGNOSIS — R07.89 ATYPICAL CHEST PAIN: ICD-10-CM

## 2018-10-30 DIAGNOSIS — R07.89 OTHER CHEST PAIN: ICD-10-CM

## 2018-10-30 LAB
ALBUMIN SERPL-MCNC: 3.2 G/DL (ref 3.5–5)
ALP SERPL-CCNC: 99 U/L (ref 45–120)
ALT SERPL W P-5'-P-CCNC: 19 U/L (ref 0–45)
ANION GAP SERPL CALCULATED.3IONS-SCNC: 17 MMOL/L (ref 5–18)
AST SERPL W P-5'-P-CCNC: 13 U/L (ref 0–40)
BILIRUB SERPL-MCNC: 0.6 MG/DL (ref 0–1)
BUN SERPL-MCNC: 10 MG/DL (ref 8–22)
CALCIUM SERPL-MCNC: 9.1 MG/DL (ref 8.5–10.5)
CHLORIDE BLD-SCNC: 101 MMOL/L (ref 98–107)
CO2 SERPL-SCNC: 17 MMOL/L (ref 22–31)
CREAT SERPL-MCNC: 0.67 MG/DL (ref 0.6–1.1)
D DIMER PPP FEU-MCNC: 0.37 FEU UG/ML
ERYTHROCYTE [DISTWIDTH] IN BLOOD BY AUTOMATED COUNT: 15.1 % (ref 11–14.5)
GFR SERPL CREATININE-BSD FRML MDRD: >60 ML/MIN/1.73M2
GLUCOSE BLD-MCNC: 251 MG/DL (ref 70–125)
HCT VFR BLD AUTO: 37.7 % (ref 35–47)
HGB BLD-MCNC: 11.8 G/DL (ref 12–16)
MCH RBC QN AUTO: 22.5 PG (ref 27–34)
MCHC RBC AUTO-ENTMCNC: 31.4 G/DL (ref 32–36)
MCV RBC AUTO: 71 FL (ref 80–100)
PLATELET # BLD AUTO: 241 THOU/UL (ref 140–440)
PMV BLD AUTO: 9 FL (ref 7–10)
POTASSIUM BLD-SCNC: 4.6 MMOL/L (ref 3.5–5)
PROT SERPL-MCNC: 7.9 G/DL (ref 6–8)
RBC # BLD AUTO: 5.28 MILL/UL (ref 3.8–5.4)
SODIUM SERPL-SCNC: 135 MMOL/L (ref 136–145)
TROPONIN I SERPL-MCNC: <0.01 NG/ML (ref 0–0.29)
TSH SERPL DL<=0.005 MIU/L-ACNC: 2.24 UIU/ML (ref 0.3–5)
WBC: 11.8 THOU/UL (ref 4–11)

## 2018-11-01 ENCOUNTER — COMMUNICATION - HEALTHEAST (OUTPATIENT)
Dept: NURSING | Facility: CLINIC | Age: 43
End: 2018-11-01

## 2018-11-09 ENCOUNTER — COMMUNICATION - HEALTHEAST (OUTPATIENT)
Dept: NURSING | Facility: CLINIC | Age: 43
End: 2018-11-09

## 2018-11-10 ENCOUNTER — COMMUNICATION - HEALTHEAST (OUTPATIENT)
Dept: FAMILY MEDICINE | Facility: CLINIC | Age: 43
End: 2018-11-10

## 2018-11-10 DIAGNOSIS — J30.89 PERENNIAL ALLERGIC RHINITIS: ICD-10-CM

## 2018-11-14 ENCOUNTER — HOSPITAL ENCOUNTER (OUTPATIENT)
Dept: MAMMOGRAPHY | Facility: CLINIC | Age: 43
Discharge: HOME | End: 2018-11-14
Attending: FAMILY MEDICINE

## 2018-11-14 DIAGNOSIS — R92.8 ABNORMAL SCREENING MAMMOGRAM: ICD-10-CM

## 2018-11-20 ENCOUNTER — HOSPITAL ENCOUNTER (OUTPATIENT)
Dept: MAMMOGRAPHY | Facility: CLINIC | Age: 43
Discharge: HOME | End: 2018-11-20
Attending: FAMILY MEDICINE

## 2018-11-20 ENCOUNTER — HOSPITAL ENCOUNTER (OUTPATIENT)
Dept: MAMMOGRAPHY | Facility: CLINIC | Age: 43
Discharge: HOME | End: 2018-11-20
Attending: FAMILY MEDICINE | Admitting: RADIOLOGY

## 2018-11-20 DIAGNOSIS — R92.8 OTHER ABNORMAL AND INCONCLUSIVE FINDINGS ON DIAGNOSTIC IMAGING OF BREAST: ICD-10-CM

## 2018-11-20 DIAGNOSIS — R92.30 BREAST DENSITY: ICD-10-CM

## 2018-11-21 ENCOUNTER — COMMUNICATION - HEALTHEAST (OUTPATIENT)
Dept: MAMMOGRAPHY | Facility: CLINIC | Age: 43
End: 2018-11-21

## 2018-11-21 LAB
LAB AP CHARGES (HE HISTORICAL CONVERSION): NORMAL
PATH REPORT.COMMENTS IMP SPEC: NORMAL
PATH REPORT.COMMENTS IMP SPEC: NORMAL
PATH REPORT.FINAL DX SPEC: NORMAL
PATH REPORT.GROSS SPEC: NORMAL
PATH REPORT.MICROSCOPIC SPEC OTHER STN: NORMAL
PATH REPORT.RELEVANT HX SPEC: NORMAL
RESULT FLAG (HE HISTORICAL CONVERSION): NORMAL

## 2018-11-29 ENCOUNTER — COMMUNICATION - HEALTHEAST (OUTPATIENT)
Dept: FAMILY MEDICINE | Facility: CLINIC | Age: 43
End: 2018-11-29

## 2018-12-07 ENCOUNTER — OFFICE VISIT - HEALTHEAST (OUTPATIENT)
Dept: FAMILY MEDICINE | Facility: CLINIC | Age: 43
End: 2018-12-07

## 2018-12-07 DIAGNOSIS — M54.50 CHRONIC MIDLINE LOW BACK PAIN WITHOUT SCIATICA: ICD-10-CM

## 2018-12-07 DIAGNOSIS — E55.9 VITAMIN D DEFICIENCY: ICD-10-CM

## 2018-12-07 DIAGNOSIS — S82.832G CLOSED AVULSION FRACTURE OF DISTAL FIBULA WITH DELAYED HEALING, LEFT: ICD-10-CM

## 2018-12-07 DIAGNOSIS — G89.29 CHRONIC MIDLINE LOW BACK PAIN WITHOUT SCIATICA: ICD-10-CM

## 2018-12-07 DIAGNOSIS — M72.2 PLANTAR FASCIITIS OF RIGHT FOOT: ICD-10-CM

## 2018-12-07 DIAGNOSIS — E66.01 MORBID OBESITY WITH BMI OF 45.0-49.9, ADULT (H): ICD-10-CM

## 2018-12-07 ASSESSMENT — MIFFLIN-ST. JEOR: SCORE: 2043.74

## 2018-12-19 ENCOUNTER — HOSPITAL ENCOUNTER (OUTPATIENT)
Dept: PHYSICAL THERAPY | Age: 43
Setting detail: THERAPIES SERIES
Discharge: STILL ADMITTED OR ADMINISTRATIVE | End: 2018-12-19
Attending: FAMILY MEDICINE

## 2018-12-19 DIAGNOSIS — S82.832A CLOSED FRACTURE OF DISTAL END OF LEFT FIBULA, UNSPECIFIED FRACTURE MORPHOLOGY, INITIAL ENCOUNTER: ICD-10-CM

## 2018-12-19 DIAGNOSIS — R26.89 IMPAIRMENT OF BALANCE: ICD-10-CM

## 2018-12-19 DIAGNOSIS — M62.81 GENERALIZED MUSCLE WEAKNESS: ICD-10-CM

## 2018-12-19 DIAGNOSIS — R26.81 UNSTEADY GAIT: ICD-10-CM

## 2018-12-21 ENCOUNTER — RECORDS - HEALTHEAST (OUTPATIENT)
Dept: ADMINISTRATIVE | Facility: OTHER | Age: 43
End: 2018-12-21

## 2018-12-28 ENCOUNTER — COMMUNICATION - HEALTHEAST (OUTPATIENT)
Dept: FAMILY MEDICINE | Facility: CLINIC | Age: 43
End: 2018-12-28

## 2019-01-21 ENCOUNTER — COMMUNICATION - HEALTHEAST (OUTPATIENT)
Dept: FAMILY MEDICINE | Facility: CLINIC | Age: 44
End: 2019-01-21

## 2019-01-24 ENCOUNTER — COMMUNICATION - HEALTHEAST (OUTPATIENT)
Dept: FAMILY MEDICINE | Facility: CLINIC | Age: 44
End: 2019-01-24

## 2019-01-29 ENCOUNTER — AMBULATORY - HEALTHEAST (OUTPATIENT)
Dept: MULTI SPECIALTY CLINIC | Facility: CLINIC | Age: 44
End: 2019-01-29

## 2019-01-29 ENCOUNTER — RECORDS - HEALTHEAST (OUTPATIENT)
Dept: ADMINISTRATIVE | Facility: OTHER | Age: 44
End: 2019-01-29

## 2019-01-29 LAB — HBA1C MFR BLD: 8.6 % (ref 0–5.6)

## 2019-02-14 ENCOUNTER — COMMUNICATION - HEALTHEAST (OUTPATIENT)
Dept: FAMILY MEDICINE | Facility: CLINIC | Age: 44
End: 2019-02-14

## 2019-02-14 DIAGNOSIS — F33.0 MAJOR DEPRESSIVE DISORDER, RECURRENT EPISODE, MILD (H): ICD-10-CM

## 2019-02-15 ENCOUNTER — RECORDS - HEALTHEAST (OUTPATIENT)
Dept: ADMINISTRATIVE | Facility: OTHER | Age: 44
End: 2019-02-15

## 2019-02-21 ENCOUNTER — COMMUNICATION - HEALTHEAST (OUTPATIENT)
Dept: FAMILY MEDICINE | Facility: CLINIC | Age: 44
End: 2019-02-21

## 2019-02-21 DIAGNOSIS — F33.0 MAJOR DEPRESSIVE DISORDER, RECURRENT EPISODE, MILD (H): ICD-10-CM

## 2019-03-04 ENCOUNTER — RECORDS - HEALTHEAST (OUTPATIENT)
Dept: ADMINISTRATIVE | Facility: OTHER | Age: 44
End: 2019-03-04

## 2019-03-18 ENCOUNTER — COMMUNICATION - HEALTHEAST (OUTPATIENT)
Dept: SCHEDULING | Facility: CLINIC | Age: 44
End: 2019-03-18

## 2019-03-21 ENCOUNTER — OFFICE VISIT - HEALTHEAST (OUTPATIENT)
Dept: FAMILY MEDICINE | Facility: CLINIC | Age: 44
End: 2019-03-21

## 2019-03-21 DIAGNOSIS — I87.8 VENOUS STASIS: ICD-10-CM

## 2019-03-21 DIAGNOSIS — F33.0 MAJOR DEPRESSIVE DISORDER, RECURRENT EPISODE, MILD (H): ICD-10-CM

## 2019-03-21 DIAGNOSIS — E66.01 MORBID OBESITY WITH BMI OF 45.0-49.9, ADULT (H): ICD-10-CM

## 2019-03-21 DIAGNOSIS — D50.9 MICROCYTIC ANEMIA: ICD-10-CM

## 2019-03-21 DIAGNOSIS — L29.9 ITCHING: ICD-10-CM

## 2019-03-21 DIAGNOSIS — E11.8 TYPE 2 DIABETES MELLITUS WITH COMPLICATION, WITHOUT LONG-TERM CURRENT USE OF INSULIN (H): ICD-10-CM

## 2019-03-21 DIAGNOSIS — G25.81 RESTLESS LEGS SYNDROME (RLS): ICD-10-CM

## 2019-03-21 DIAGNOSIS — G63 POLYNEUROPATHY ASSOCIATED WITH UNDERLYING DISEASE (H): ICD-10-CM

## 2019-03-21 DIAGNOSIS — J30.1 SEASONAL ALLERGIC RHINITIS DUE TO POLLEN: ICD-10-CM

## 2019-03-21 LAB
ALBUMIN SERPL-MCNC: 2.9 G/DL (ref 3.5–5)
ALP SERPL-CCNC: 73 U/L (ref 45–120)
ALT SERPL W P-5'-P-CCNC: 13 U/L (ref 0–45)
ANION GAP SERPL CALCULATED.3IONS-SCNC: 15 MMOL/L (ref 5–18)
AST SERPL W P-5'-P-CCNC: 10 U/L (ref 0–40)
BASOPHILS # BLD AUTO: 0.1 THOU/UL (ref 0–0.2)
BASOPHILS NFR BLD AUTO: 1 % (ref 0–2)
BILIRUB SERPL-MCNC: 0.5 MG/DL (ref 0–1)
BUN SERPL-MCNC: 12 MG/DL (ref 8–22)
CALCIUM SERPL-MCNC: 8.5 MG/DL (ref 8.5–10.5)
CHLORIDE BLD-SCNC: 103 MMOL/L (ref 98–107)
CO2 SERPL-SCNC: 21 MMOL/L (ref 22–31)
CREAT SERPL-MCNC: 0.7 MG/DL (ref 0.6–1.1)
CREAT UR-MCNC: 125.1 MG/DL
EOSINOPHIL # BLD AUTO: 0.6 THOU/UL (ref 0–0.4)
EOSINOPHIL NFR BLD AUTO: 5 % (ref 0–6)
ERYTHROCYTE [DISTWIDTH] IN BLOOD BY AUTOMATED COUNT: 18.6 % (ref 11–14.5)
FERRITIN SERPL-MCNC: 5 NG/ML (ref 10–130)
GFR SERPL CREATININE-BSD FRML MDRD: >60 ML/MIN/1.73M2
GLUCOSE BLD-MCNC: 189 MG/DL (ref 70–125)
HCT VFR BLD AUTO: 33.4 % (ref 35–47)
HGB BLD-MCNC: 9.4 G/DL (ref 12–16)
LYMPHOCYTES # BLD AUTO: 3.1 THOU/UL (ref 0.8–4.4)
LYMPHOCYTES NFR BLD AUTO: 28 % (ref 20–40)
MCH RBC QN AUTO: 20.7 PG (ref 27–34)
MCHC RBC AUTO-ENTMCNC: 28.1 G/DL (ref 32–36)
MCV RBC AUTO: 74 FL (ref 80–100)
MICROALBUMIN UR-MCNC: 1.4 MG/DL (ref 0–1.99)
MICROALBUMIN/CREAT UR: 11.2 MG/G
MONOCYTES # BLD AUTO: 0.6 THOU/UL (ref 0–0.9)
MONOCYTES NFR BLD AUTO: 5 % (ref 2–10)
NEUTROPHILS # BLD AUTO: 6.7 THOU/UL (ref 2–7.7)
NEUTROPHILS NFR BLD AUTO: 61 % (ref 50–70)
PLATELET # BLD AUTO: 564 THOU/UL (ref 140–440)
PMV BLD AUTO: 10.1 FL (ref 8.5–12.5)
POTASSIUM BLD-SCNC: 4.6 MMOL/L (ref 3.5–5)
PROT SERPL-MCNC: 7 G/DL (ref 6–8)
RBC # BLD AUTO: 4.54 MILL/UL (ref 3.8–5.4)
SODIUM SERPL-SCNC: 139 MMOL/L (ref 136–145)
WBC: 11.1 THOU/UL (ref 4–11)

## 2019-03-22 LAB
IRON SATN MFR SERPL: 6 % (ref 20–50)
IRON SERPL-MCNC: 24 UG/DL (ref 42–175)
TIBC SERPL-MCNC: 394 UG/DL (ref 313–563)
TRANSFERRIN SERPL-MCNC: 315 MG/DL (ref 212–360)

## 2019-03-25 LAB
HEMOGLOBIN A2 QUANTITATION: 2.9 % (ref 2.2–3.5)
HEMOGLOBIN ELECTROPHRESIS: NORMAL
HEMOGLOBIN F QUANTITATION: <0.8 % (ref 0–2)
PATH ICD:: NORMAL
REVIEWING PATHOLOGIST: NORMAL

## 2019-03-26 ENCOUNTER — COMMUNICATION - HEALTHEAST (OUTPATIENT)
Dept: FAMILY MEDICINE | Facility: CLINIC | Age: 44
End: 2019-03-26

## 2019-04-11 ENCOUNTER — COMMUNICATION - HEALTHEAST (OUTPATIENT)
Dept: CARE COORDINATION | Facility: CLINIC | Age: 44
End: 2019-04-11

## 2019-04-15 ENCOUNTER — OFFICE VISIT - HEALTHEAST (OUTPATIENT)
Dept: FAMILY MEDICINE | Facility: CLINIC | Age: 44
End: 2019-04-15

## 2019-04-15 DIAGNOSIS — Z09 HOSPITAL DISCHARGE FOLLOW-UP: ICD-10-CM

## 2019-04-15 DIAGNOSIS — E11.8 TYPE 2 DIABETES MELLITUS WITH COMPLICATION, WITHOUT LONG-TERM CURRENT USE OF INSULIN (H): ICD-10-CM

## 2019-04-15 DIAGNOSIS — K92.0 HEMATEMESIS WITH NAUSEA: ICD-10-CM

## 2019-04-15 DIAGNOSIS — D50.9 MICROCYTIC ANEMIA: ICD-10-CM

## 2019-04-15 DIAGNOSIS — I89.0 LYMPHEDEMA OF BOTH LOWER EXTREMITIES: ICD-10-CM

## 2019-04-15 DIAGNOSIS — G25.81 RESTLESS LEGS SYNDROME (RLS): ICD-10-CM

## 2019-04-15 LAB
ERYTHROCYTE [DISTWIDTH] IN BLOOD BY AUTOMATED COUNT: 17.7 % (ref 11–14.5)
HCT VFR BLD AUTO: 35.3 % (ref 35–47)
HGB BLD-MCNC: 11 G/DL (ref 12–16)
MCH RBC QN AUTO: 20.8 PG (ref 27–34)
MCHC RBC AUTO-ENTMCNC: 31.2 G/DL (ref 32–36)
MCV RBC AUTO: 67 FL (ref 80–100)
PLATELET # BLD AUTO: 665 THOU/UL (ref 140–440)
PMV BLD AUTO: 7.7 FL (ref 7–10)
RBC # BLD AUTO: 5.28 MILL/UL (ref 3.8–5.4)
WBC: 10.9 THOU/UL (ref 4–11)

## 2019-04-16 ENCOUNTER — COMMUNICATION - HEALTHEAST (OUTPATIENT)
Dept: FAMILY MEDICINE | Facility: CLINIC | Age: 44
End: 2019-04-16

## 2019-04-17 ENCOUNTER — COMMUNICATION - HEALTHEAST (OUTPATIENT)
Dept: FAMILY MEDICINE | Facility: CLINIC | Age: 44
End: 2019-04-17

## 2019-05-02 ENCOUNTER — COMMUNICATION - HEALTHEAST (OUTPATIENT)
Dept: FAMILY MEDICINE | Facility: CLINIC | Age: 44
End: 2019-05-02

## 2019-05-06 ENCOUNTER — COMMUNICATION - HEALTHEAST (OUTPATIENT)
Dept: FAMILY MEDICINE | Facility: CLINIC | Age: 44
End: 2019-05-06

## 2019-05-07 ENCOUNTER — COMMUNICATION - HEALTHEAST (OUTPATIENT)
Dept: FAMILY MEDICINE | Facility: CLINIC | Age: 44
End: 2019-05-07

## 2019-05-22 ENCOUNTER — COMMUNICATION - HEALTHEAST (OUTPATIENT)
Dept: FAMILY MEDICINE | Facility: CLINIC | Age: 44
End: 2019-05-22

## 2019-05-22 DIAGNOSIS — K21.00 REFLUX ESOPHAGITIS: ICD-10-CM

## 2019-05-23 ENCOUNTER — COMMUNICATION - HEALTHEAST (OUTPATIENT)
Dept: FAMILY MEDICINE | Facility: CLINIC | Age: 44
End: 2019-05-23

## 2019-05-28 ENCOUNTER — ANESTHESIA - HEALTHEAST (OUTPATIENT)
Dept: SURGERY | Facility: CLINIC | Age: 44
End: 2019-05-28

## 2019-05-28 ASSESSMENT — MIFFLIN-ST. JEOR
SCORE: 2064.14
SCORE: 2081.49

## 2019-05-29 ENCOUNTER — SURGERY - HEALTHEAST (OUTPATIENT)
Dept: SURGERY | Facility: CLINIC | Age: 44
End: 2019-05-29

## 2019-05-30 ENCOUNTER — COMMUNICATION - HEALTHEAST (OUTPATIENT)
Dept: FAMILY MEDICINE | Facility: CLINIC | Age: 44
End: 2019-05-30

## 2019-05-30 DIAGNOSIS — Z79.4 TYPE 2 DIABETES MELLITUS WITH COMPLICATION, WITH LONG-TERM CURRENT USE OF INSULIN (H): ICD-10-CM

## 2019-05-30 DIAGNOSIS — E11.8 TYPE 2 DIABETES MELLITUS WITH COMPLICATION, WITH LONG-TERM CURRENT USE OF INSULIN (H): ICD-10-CM

## 2019-06-03 ENCOUNTER — COMMUNICATION - HEALTHEAST (OUTPATIENT)
Dept: CARE COORDINATION | Facility: CLINIC | Age: 44
End: 2019-06-03

## 2019-06-04 ENCOUNTER — COMMUNICATION - HEALTHEAST (OUTPATIENT)
Dept: FAMILY MEDICINE | Facility: CLINIC | Age: 44
End: 2019-06-04

## 2019-06-04 DIAGNOSIS — I89.0 LYMPHEDEMA OF BOTH LOWER EXTREMITIES: ICD-10-CM

## 2019-06-07 ENCOUNTER — OFFICE VISIT - HEALTHEAST (OUTPATIENT)
Dept: FAMILY MEDICINE | Facility: CLINIC | Age: 44
End: 2019-06-07

## 2019-06-07 DIAGNOSIS — K92.0 HEMATEMESIS, PRESENCE OF NAUSEA NOT SPECIFIED: ICD-10-CM

## 2019-06-07 DIAGNOSIS — D50.8 OTHER IRON DEFICIENCY ANEMIA: ICD-10-CM

## 2019-06-07 LAB
ERYTHROCYTE [DISTWIDTH] IN BLOOD BY AUTOMATED COUNT: 16.6 % (ref 11–14.5)
HCT VFR BLD AUTO: 34.2 % (ref 35–47)
HGB BLD-MCNC: 10.8 G/DL (ref 12–16)
MCH RBC QN AUTO: 21.5 PG (ref 27–34)
MCHC RBC AUTO-ENTMCNC: 31.7 G/DL (ref 32–36)
MCV RBC AUTO: 68 FL (ref 80–100)
PLATELET # BLD AUTO: 609 THOU/UL (ref 140–440)
PMV BLD AUTO: 8.1 FL (ref 7–10)
RBC # BLD AUTO: 5.05 MILL/UL (ref 3.8–5.4)
WBC: 10.1 THOU/UL (ref 4–11)

## 2019-06-17 ENCOUNTER — COMMUNICATION - HEALTHEAST (OUTPATIENT)
Dept: SCHEDULING | Facility: CLINIC | Age: 44
End: 2019-06-17

## 2019-06-17 DIAGNOSIS — R04.2 COUGH WITH HEMOPTYSIS: ICD-10-CM

## 2019-06-20 ENCOUNTER — OFFICE VISIT - HEALTHEAST (OUTPATIENT)
Dept: FAMILY MEDICINE | Facility: CLINIC | Age: 44
End: 2019-06-20

## 2019-06-20 ENCOUNTER — RECORDS - HEALTHEAST (OUTPATIENT)
Dept: GENERAL RADIOLOGY | Facility: CLINIC | Age: 44
End: 2019-06-20

## 2019-06-20 DIAGNOSIS — K92.0 HEMATEMESIS WITH NAUSEA: ICD-10-CM

## 2019-06-20 DIAGNOSIS — D50.8 OTHER IRON DEFICIENCY ANEMIA: ICD-10-CM

## 2019-06-20 DIAGNOSIS — K92.0 HEMATEMESIS: ICD-10-CM

## 2019-06-20 LAB
ERYTHROCYTE [DISTWIDTH] IN BLOOD BY AUTOMATED COUNT: 17.2 % (ref 11–14.5)
HCT VFR BLD AUTO: 32.3 % (ref 35–47)
HGB BLD-MCNC: 10.3 G/DL (ref 12–16)
MCH RBC QN AUTO: 21.6 PG (ref 27–34)
MCHC RBC AUTO-ENTMCNC: 31.7 G/DL (ref 32–36)
MCV RBC AUTO: 68 FL (ref 80–100)
PLATELET # BLD AUTO: 578 THOU/UL (ref 140–440)
PMV BLD AUTO: 8.5 FL (ref 7–10)
RBC # BLD AUTO: 4.76 MILL/UL (ref 3.8–5.4)
WBC: 11.3 THOU/UL (ref 4–11)

## 2019-06-21 ENCOUNTER — COMMUNICATION - HEALTHEAST (OUTPATIENT)
Dept: FAMILY MEDICINE | Facility: CLINIC | Age: 44
End: 2019-06-21

## 2019-06-25 ENCOUNTER — HOSPITAL ENCOUNTER (OUTPATIENT)
Dept: CT IMAGING | Facility: CLINIC | Age: 44
Discharge: HOME | End: 2019-06-25
Attending: FAMILY MEDICINE

## 2019-06-25 DIAGNOSIS — R04.2 COUGH WITH HEMOPTYSIS: ICD-10-CM

## 2019-06-26 ENCOUNTER — COMMUNICATION - HEALTHEAST (OUTPATIENT)
Dept: FAMILY MEDICINE | Facility: CLINIC | Age: 44
End: 2019-06-26

## 2019-06-26 DIAGNOSIS — K92.0 HEMATEMESIS WITHOUT NAUSEA: ICD-10-CM

## 2019-06-27 ENCOUNTER — COMMUNICATION - HEALTHEAST (OUTPATIENT)
Dept: FAMILY MEDICINE | Facility: CLINIC | Age: 44
End: 2019-06-27

## 2019-07-29 ENCOUNTER — OFFICE VISIT - HEALTHEAST (OUTPATIENT)
Dept: OTOLARYNGOLOGY | Facility: CLINIC | Age: 44
End: 2019-07-29

## 2019-07-29 DIAGNOSIS — K92.0 HEMATEMESIS, PRESENCE OF NAUSEA NOT SPECIFIED: ICD-10-CM

## 2019-07-30 ENCOUNTER — COMMUNICATION - HEALTHEAST (OUTPATIENT)
Dept: FAMILY MEDICINE | Facility: CLINIC | Age: 44
End: 2019-07-30

## 2019-08-01 ENCOUNTER — COMMUNICATION - HEALTHEAST (OUTPATIENT)
Dept: SCHEDULING | Facility: CLINIC | Age: 44
End: 2019-08-01

## 2019-08-01 ENCOUNTER — COMMUNICATION - HEALTHEAST (OUTPATIENT)
Dept: FAMILY MEDICINE | Facility: CLINIC | Age: 44
End: 2019-08-01

## 2019-08-01 DIAGNOSIS — K92.0 HEMATEMESIS WITHOUT NAUSEA: ICD-10-CM

## 2019-09-12 LAB — HBA1C MFR BLD: 9.1 % (ref 0–5.6)

## 2019-09-16 ENCOUNTER — RECORDS - HEALTHEAST (OUTPATIENT)
Dept: ADMINISTRATIVE | Facility: OTHER | Age: 44
End: 2019-09-16

## 2019-09-17 ENCOUNTER — RECORDS - HEALTHEAST (OUTPATIENT)
Dept: HEALTH INFORMATION MANAGEMENT | Facility: CLINIC | Age: 44
End: 2019-09-17

## 2019-09-17 ENCOUNTER — RECORDS - HEALTHEAST (OUTPATIENT)
Dept: ADMINISTRATIVE | Facility: OTHER | Age: 44
End: 2019-09-17

## 2019-09-27 ENCOUNTER — COMMUNICATION - HEALTHEAST (OUTPATIENT)
Dept: FAMILY MEDICINE | Facility: CLINIC | Age: 44
End: 2019-09-27

## 2019-09-30 ENCOUNTER — RECORDS - HEALTHEAST (OUTPATIENT)
Dept: ADMINISTRATIVE | Facility: OTHER | Age: 44
End: 2019-09-30

## 2019-10-08 ENCOUNTER — RECORDS - HEALTHEAST (OUTPATIENT)
Dept: ADMINISTRATIVE | Facility: OTHER | Age: 44
End: 2019-10-08

## 2019-10-10 ENCOUNTER — OFFICE VISIT - HEALTHEAST (OUTPATIENT)
Dept: FAMILY MEDICINE | Facility: CLINIC | Age: 44
End: 2019-10-10

## 2019-10-10 DIAGNOSIS — E83.42 HYPOMAGNESEMIA: ICD-10-CM

## 2019-10-10 DIAGNOSIS — Z00.00 ROUTINE GENERAL MEDICAL EXAMINATION AT A HEALTH CARE FACILITY: ICD-10-CM

## 2019-10-10 DIAGNOSIS — K92.0 HEMATEMESIS WITHOUT NAUSEA: ICD-10-CM

## 2019-10-10 DIAGNOSIS — Z12.39 BREAST CANCER SCREENING, HIGH RISK PATIENT: ICD-10-CM

## 2019-10-10 DIAGNOSIS — Z31.7 ENCOUNTER FOR PROCREATIVE MANAGEMENT AND COUNSELING FOR GESTATIONAL CARRIER: ICD-10-CM

## 2019-10-10 DIAGNOSIS — E11.40 TYPE 2 DIABETES MELLITUS WITH DIABETIC NEUROPATHY, WITH LONG-TERM CURRENT USE OF INSULIN (H): ICD-10-CM

## 2019-10-10 DIAGNOSIS — Z03.89 ENCOUNTER FOR OBSERVATION FOR OTHER SUSPECTED DISEASES AND CONDITIONS RULED OUT: ICD-10-CM

## 2019-10-10 DIAGNOSIS — E66.813 CLASS 3 SEVERE OBESITY DUE TO EXCESS CALORIES WITH SERIOUS COMORBIDITY AND BODY MASS INDEX (BMI) OF 50.0 TO 59.9 IN ADULT (H): ICD-10-CM

## 2019-10-10 DIAGNOSIS — Z23 NEED FOR INFLUENZA VACCINATION: ICD-10-CM

## 2019-10-10 DIAGNOSIS — N92.6 MISSED PERIOD: ICD-10-CM

## 2019-10-10 DIAGNOSIS — Z79.4 TYPE 2 DIABETES MELLITUS WITH DIABETIC NEUROPATHY, WITH LONG-TERM CURRENT USE OF INSULIN (H): ICD-10-CM

## 2019-10-10 DIAGNOSIS — D50.9 MICROCYTIC ANEMIA: ICD-10-CM

## 2019-10-10 DIAGNOSIS — Z12.4 CERVICAL CANCER SCREENING: ICD-10-CM

## 2019-10-10 DIAGNOSIS — E55.9 VITAMIN D DEFICIENCY: ICD-10-CM

## 2019-10-10 DIAGNOSIS — E66.01 CLASS 3 SEVERE OBESITY DUE TO EXCESS CALORIES WITH SERIOUS COMORBIDITY AND BODY MASS INDEX (BMI) OF 50.0 TO 59.9 IN ADULT (H): ICD-10-CM

## 2019-10-10 LAB
ALBUMIN SERPL-MCNC: 3 G/DL (ref 3.5–5)
ALP SERPL-CCNC: 97 U/L (ref 45–120)
ALT SERPL W P-5'-P-CCNC: 20 U/L (ref 0–45)
ANION GAP SERPL CALCULATED.3IONS-SCNC: 10 MMOL/L (ref 5–18)
AST SERPL W P-5'-P-CCNC: 18 U/L (ref 0–40)
BILIRUB SERPL-MCNC: 0.6 MG/DL (ref 0–1)
BUN SERPL-MCNC: 9 MG/DL (ref 8–22)
CALCIUM SERPL-MCNC: 9.1 MG/DL (ref 8.5–10.5)
CHLORIDE BLD-SCNC: 99 MMOL/L (ref 98–107)
CO2 SERPL-SCNC: 26 MMOL/L (ref 22–31)
CREAT SERPL-MCNC: 0.82 MG/DL (ref 0.6–1.1)
ERYTHROCYTE [DISTWIDTH] IN BLOOD BY AUTOMATED COUNT: 16.4 % (ref 11–14.5)
GFR SERPL CREATININE-BSD FRML MDRD: >60 ML/MIN/1.73M2
GLUCOSE BLD-MCNC: 312 MG/DL (ref 70–125)
HCG SERPL-ACNC: <2 MLU/ML (ref 0–4)
HCT VFR BLD AUTO: 33.6 % (ref 35–47)
HGB BLD-MCNC: 10.7 G/DL (ref 12–16)
LDLC SERPL CALC-MCNC: 132 MG/DL
MAGNESIUM SERPL-MCNC: 1.8 MG/DL (ref 1.8–2.6)
MCH RBC QN AUTO: 20.9 PG (ref 27–34)
MCHC RBC AUTO-ENTMCNC: 31.9 G/DL (ref 32–36)
MCV RBC AUTO: 66 FL (ref 80–100)
PLATELET # BLD AUTO: 467 THOU/UL (ref 140–440)
PMV BLD AUTO: 8.8 FL (ref 7–10)
POTASSIUM BLD-SCNC: 4.4 MMOL/L (ref 3.5–5)
PROT SERPL-MCNC: 7.3 G/DL (ref 6–8)
RBC # BLD AUTO: 5.12 MILL/UL (ref 3.8–5.4)
SODIUM SERPL-SCNC: 135 MMOL/L (ref 136–145)
TSH SERPL DL<=0.005 MIU/L-ACNC: 1.11 UIU/ML (ref 0.3–5)
VIT B12 SERPL-MCNC: 508 PG/ML (ref 213–816)
WBC: 9.6 THOU/UL (ref 4–11)

## 2019-10-10 RX ORDER — BLOOD-GLUCOSE METER
EACH MISCELLANEOUS
Refills: 0 | Status: SHIPPED | COMMUNITY
Start: 2019-06-24

## 2019-10-10 ASSESSMENT — MIFFLIN-ST. JEOR: SCORE: 2053.37

## 2019-10-11 ENCOUNTER — RECORDS - HEALTHEAST (OUTPATIENT)
Dept: SCHEDULING | Facility: CLINIC | Age: 44
End: 2019-10-11

## 2019-10-11 LAB
25(OH)D3 SERPL-MCNC: 19.6 NG/ML (ref 30–80)
HPV SOURCE: NORMAL
HUMAN PAPILLOMA VIRUS 16 DNA: NEGATIVE
HUMAN PAPILLOMA VIRUS 18 DNA: NEGATIVE
HUMAN PAPILLOMA VIRUS FINAL DIAGNOSIS: NORMAL
HUMAN PAPILLOMA VIRUS OTHER HR: NEGATIVE
SPECIMEN DESCRIPTION: NORMAL

## 2019-10-11 ASSESSMENT — ANXIETY QUESTIONNAIRES
4. TROUBLE RELAXING: NOT AT ALL
GAD7 TOTAL SCORE: 6
IF YOU CHECKED OFF ANY PROBLEMS ON THIS QUESTIONNAIRE, HOW DIFFICULT HAVE THESE PROBLEMS MADE IT FOR YOU TO DO YOUR WORK, TAKE CARE OF THINGS AT HOME, OR GET ALONG WITH OTHER PEOPLE: SOMEWHAT DIFFICULT
6. BECOMING EASILY ANNOYED OR IRRITABLE: MORE THAN HALF THE DAYS
3. WORRYING TOO MUCH ABOUT DIFFERENT THINGS: NOT AT ALL
7. FEELING AFRAID AS IF SOMETHING AWFUL MIGHT HAPPEN: NOT AT ALL
1. FEELING NERVOUS, ANXIOUS, OR ON EDGE: MORE THAN HALF THE DAYS
5. BEING SO RESTLESS THAT IT IS HARD TO SIT STILL: MORE THAN HALF THE DAYS
2. NOT BEING ABLE TO STOP OR CONTROL WORRYING: NOT AT ALL

## 2019-10-11 ASSESSMENT — PATIENT HEALTH QUESTIONNAIRE - PHQ9: SUM OF ALL RESPONSES TO PHQ QUESTIONS 1-9: 4

## 2019-10-15 ENCOUNTER — COMMUNICATION - HEALTHEAST (OUTPATIENT)
Dept: PHARMACY | Facility: CLINIC | Age: 44
End: 2019-10-15

## 2019-10-15 ENCOUNTER — COMMUNICATION - HEALTHEAST (OUTPATIENT)
Dept: FAMILY MEDICINE | Facility: CLINIC | Age: 44
End: 2019-10-15

## 2019-10-16 LAB
BKR LAB AP ABNORMAL BLEEDING: NO
BKR LAB AP BIRTH CONTROL/HORMONES: NORMAL
BKR LAB AP CERVICAL APPEARANCE: NORMAL
BKR LAB AP GYN ADEQUACY: NORMAL
BKR LAB AP GYN INTERPRETATION: NORMAL
BKR LAB AP HPV REFLEX: NORMAL
BKR LAB AP LMP: NORMAL
BKR LAB AP PATIENT STATUS: NORMAL
BKR LAB AP PREVIOUS ABNORMAL: NO
BKR LAB AP PREVIOUS NORMAL: 2014
HIGH RISK?: NO
PATH REPORT.COMMENTS IMP SPEC: NORMAL
RESULT FLAG (HE HISTORICAL CONVERSION): NORMAL

## 2019-10-17 ENCOUNTER — COMMUNICATION - HEALTHEAST (OUTPATIENT)
Dept: PULMONOLOGY | Facility: OTHER | Age: 44
End: 2019-10-17

## 2019-10-17 ENCOUNTER — COMMUNICATION - HEALTHEAST (OUTPATIENT)
Dept: FAMILY MEDICINE | Facility: CLINIC | Age: 44
End: 2019-10-17

## 2019-10-29 ENCOUNTER — OFFICE VISIT - HEALTHEAST (OUTPATIENT)
Dept: PULMONOLOGY | Facility: OTHER | Age: 44
End: 2019-10-29

## 2019-10-29 DIAGNOSIS — R05.3 CHRONIC COUGH: ICD-10-CM

## 2019-11-05 ENCOUNTER — RECORDS - HEALTHEAST (OUTPATIENT)
Dept: ADMINISTRATIVE | Facility: OTHER | Age: 44
End: 2019-11-05

## 2019-11-08 ENCOUNTER — COMMUNICATION - HEALTHEAST (OUTPATIENT)
Dept: FAMILY MEDICINE | Facility: CLINIC | Age: 44
End: 2019-11-08

## 2019-11-12 ENCOUNTER — COMMUNICATION - HEALTHEAST (OUTPATIENT)
Dept: FAMILY MEDICINE | Facility: CLINIC | Age: 44
End: 2019-11-12

## 2019-11-21 ENCOUNTER — HOSPITAL ENCOUNTER (OUTPATIENT)
Dept: CT IMAGING | Facility: CLINIC | Age: 44
Discharge: HOME | End: 2019-11-21
Attending: INTERNAL MEDICINE

## 2019-11-21 DIAGNOSIS — R05.3 CHRONIC COUGH: ICD-10-CM

## 2019-11-22 ENCOUNTER — COMMUNICATION - HEALTHEAST (OUTPATIENT)
Dept: PULMONOLOGY | Facility: OTHER | Age: 44
End: 2019-11-22

## 2019-11-22 DIAGNOSIS — R05.3 CHRONIC COUGH: ICD-10-CM

## 2019-11-22 DIAGNOSIS — J32.0 CHRONIC MAXILLARY SINUSITIS: ICD-10-CM

## 2019-11-25 ENCOUNTER — COMMUNICATION - HEALTHEAST (OUTPATIENT)
Dept: FAMILY MEDICINE | Facility: CLINIC | Age: 44
End: 2019-11-25

## 2019-11-25 DIAGNOSIS — F33.0 MAJOR DEPRESSIVE DISORDER, RECURRENT EPISODE, MILD (H): ICD-10-CM

## 2019-11-29 ENCOUNTER — COMMUNICATION - HEALTHEAST (OUTPATIENT)
Dept: FAMILY MEDICINE | Facility: CLINIC | Age: 44
End: 2019-11-29

## 2019-11-29 DIAGNOSIS — J30.89 PERENNIAL ALLERGIC RHINITIS: ICD-10-CM

## 2019-12-17 ENCOUNTER — RECORDS - HEALTHEAST (OUTPATIENT)
Dept: ADMINISTRATIVE | Facility: OTHER | Age: 44
End: 2019-12-17

## 2019-12-26 ENCOUNTER — COMMUNICATION - HEALTHEAST (OUTPATIENT)
Dept: FAMILY MEDICINE | Facility: CLINIC | Age: 44
End: 2019-12-26

## 2019-12-26 DIAGNOSIS — L29.9 ITCHING: ICD-10-CM

## 2020-01-01 ENCOUNTER — COMMUNICATION - HEALTHEAST (OUTPATIENT)
Dept: FAMILY MEDICINE | Facility: CLINIC | Age: 45
End: 2020-01-01

## 2020-01-20 ENCOUNTER — OFFICE VISIT - HEALTHEAST (OUTPATIENT)
Dept: PULMONOLOGY | Facility: OTHER | Age: 45
End: 2020-01-20

## 2020-01-20 DIAGNOSIS — R11.10 INTRACTABLE VOMITING, PRESENCE OF NAUSEA NOT SPECIFIED, UNSPECIFIED VOMITING TYPE: ICD-10-CM

## 2020-01-29 ENCOUNTER — HOSPITAL ENCOUNTER (OUTPATIENT)
Dept: NUCLEAR MEDICINE | Facility: CLINIC | Age: 45
Discharge: HOME | End: 2020-01-29
Attending: INTERNAL MEDICINE

## 2020-01-29 ENCOUNTER — HOSPITAL ENCOUNTER (OUTPATIENT)
Dept: RADIOLOGY | Facility: CLINIC | Age: 45
Discharge: HOME | End: 2020-01-29
Attending: INTERNAL MEDICINE

## 2020-01-29 ENCOUNTER — COMMUNICATION - HEALTHEAST (OUTPATIENT)
Dept: PULMONOLOGY | Facility: OTHER | Age: 45
End: 2020-01-29

## 2020-01-29 DIAGNOSIS — R11.10 INTRACTABLE VOMITING, PRESENCE OF NAUSEA NOT SPECIFIED, UNSPECIFIED VOMITING TYPE: ICD-10-CM

## 2020-01-31 ENCOUNTER — COMMUNICATION - HEALTHEAST (OUTPATIENT)
Dept: FAMILY MEDICINE | Facility: CLINIC | Age: 45
End: 2020-01-31

## 2020-01-31 DIAGNOSIS — E11.40 TYPE 2 DIABETES MELLITUS WITH DIABETIC NEUROPATHY, WITH LONG-TERM CURRENT USE OF INSULIN (H): ICD-10-CM

## 2020-01-31 DIAGNOSIS — Z79.4 TYPE 2 DIABETES MELLITUS WITH DIABETIC NEUROPATHY, WITH LONG-TERM CURRENT USE OF INSULIN (H): ICD-10-CM

## 2020-01-31 DIAGNOSIS — I87.8 VENOUS STASIS: ICD-10-CM

## 2020-02-01 ENCOUNTER — COMMUNICATION - HEALTHEAST (OUTPATIENT)
Dept: PULMONOLOGY | Facility: OTHER | Age: 45
End: 2020-02-01

## 2020-02-01 DIAGNOSIS — R11.2 NAUSEA AND VOMITING, INTRACTABILITY OF VOMITING NOT SPECIFIED, UNSPECIFIED VOMITING TYPE: ICD-10-CM

## 2020-02-01 DIAGNOSIS — E11.43 DIABETIC GASTROPARESIS (H): ICD-10-CM

## 2020-02-01 DIAGNOSIS — K31.84 DIABETIC GASTROPARESIS (H): ICD-10-CM

## 2020-02-08 ENCOUNTER — COMMUNICATION - HEALTHEAST (OUTPATIENT)
Dept: FAMILY MEDICINE | Facility: CLINIC | Age: 45
End: 2020-02-08

## 2020-02-08 DIAGNOSIS — F33.0 MAJOR DEPRESSIVE DISORDER, RECURRENT EPISODE, MILD (H): ICD-10-CM

## 2020-02-08 DIAGNOSIS — K21.00 REFLUX ESOPHAGITIS: ICD-10-CM

## 2020-02-13 ENCOUNTER — AMBULATORY - HEALTHEAST (OUTPATIENT)
Dept: MULTI SPECIALTY CLINIC | Facility: CLINIC | Age: 45
End: 2020-02-13

## 2020-02-13 LAB
CHOLEST SERPL-MCNC: 156 MG/DL (ref 100–199)
HBA1C MFR BLD: 10.1 % (ref 0–5.6)
HDLC SERPL-MCNC: 40 MG/DL
LDLC SERPL CALC-MCNC: 92 MG/DL
TRIGLYCERIDES (HISTORICAL CONVERSION): 120 MG/DL

## 2020-02-14 ENCOUNTER — COMMUNICATION - HEALTHEAST (OUTPATIENT)
Dept: FAMILY MEDICINE | Facility: CLINIC | Age: 45
End: 2020-02-14

## 2020-02-14 ENCOUNTER — RECORDS - HEALTHEAST (OUTPATIENT)
Dept: ADMINISTRATIVE | Facility: OTHER | Age: 45
End: 2020-02-14

## 2020-02-27 ENCOUNTER — OFFICE VISIT - HEALTHEAST (OUTPATIENT)
Dept: PULMONOLOGY | Facility: OTHER | Age: 45
End: 2020-02-27

## 2020-02-27 DIAGNOSIS — E11.43 DIABETIC GASTROPARESIS (H): ICD-10-CM

## 2020-02-27 DIAGNOSIS — K31.84 DIABETIC GASTROPARESIS (H): ICD-10-CM

## 2020-03-16 ENCOUNTER — COMMUNICATION - HEALTHEAST (OUTPATIENT)
Dept: FAMILY MEDICINE | Facility: CLINIC | Age: 45
End: 2020-03-16

## 2020-03-16 ENCOUNTER — RECORDS - HEALTHEAST (OUTPATIENT)
Dept: ADMINISTRATIVE | Facility: OTHER | Age: 45
End: 2020-03-16

## 2020-03-20 ENCOUNTER — OFFICE VISIT - HEALTHEAST (OUTPATIENT)
Dept: FAMILY MEDICINE | Facility: CLINIC | Age: 45
End: 2020-03-20

## 2020-03-20 DIAGNOSIS — K31.84 DIABETIC GASTROPARESIS (H): ICD-10-CM

## 2020-03-20 DIAGNOSIS — E11.43 DIABETIC GASTROPARESIS (H): ICD-10-CM

## 2020-03-20 DIAGNOSIS — E55.9 VITAMIN D DEFICIENCY: ICD-10-CM

## 2020-03-23 ENCOUNTER — RECORDS - HEALTHEAST (OUTPATIENT)
Dept: ADMINISTRATIVE | Facility: OTHER | Age: 45
End: 2020-03-23

## 2020-03-30 ENCOUNTER — COMMUNICATION - HEALTHEAST (OUTPATIENT)
Dept: FAMILY MEDICINE | Facility: CLINIC | Age: 45
End: 2020-03-30

## 2020-03-30 ENCOUNTER — AMBULATORY - HEALTHEAST (OUTPATIENT)
Dept: FAMILY MEDICINE | Facility: CLINIC | Age: 45
End: 2020-03-30

## 2020-03-30 DIAGNOSIS — G25.81 RESTLESS LEGS SYNDROME (RLS): ICD-10-CM

## 2020-04-13 ENCOUNTER — AMBULATORY - HEALTHEAST (OUTPATIENT)
Dept: FAMILY MEDICINE | Facility: CLINIC | Age: 45
End: 2020-04-13

## 2020-04-14 ENCOUNTER — RECORDS - HEALTHEAST (OUTPATIENT)
Dept: HEALTH INFORMATION MANAGEMENT | Facility: CLINIC | Age: 45
End: 2020-04-14

## 2020-04-14 ENCOUNTER — COMMUNICATION - HEALTHEAST (OUTPATIENT)
Dept: FAMILY MEDICINE | Facility: CLINIC | Age: 45
End: 2020-04-14

## 2020-04-15 ENCOUNTER — COMMUNICATION - HEALTHEAST (OUTPATIENT)
Dept: FAMILY MEDICINE | Facility: CLINIC | Age: 45
End: 2020-04-15

## 2020-04-16 ENCOUNTER — COMMUNICATION - HEALTHEAST (OUTPATIENT)
Dept: FAMILY MEDICINE | Facility: CLINIC | Age: 45
End: 2020-04-16

## 2020-04-17 ENCOUNTER — RECORDS - HEALTHEAST (OUTPATIENT)
Dept: ADMINISTRATIVE | Facility: OTHER | Age: 45
End: 2020-04-17

## 2020-04-17 ENCOUNTER — OFFICE VISIT - HEALTHEAST (OUTPATIENT)
Dept: FAMILY MEDICINE | Facility: CLINIC | Age: 45
End: 2020-04-17

## 2020-04-17 DIAGNOSIS — L29.9 ITCHING: ICD-10-CM

## 2020-04-17 DIAGNOSIS — E11.43 DIABETIC GASTROPARESIS (H): ICD-10-CM

## 2020-04-17 DIAGNOSIS — K31.84 DIABETIC GASTROPARESIS (H): ICD-10-CM

## 2020-04-17 DIAGNOSIS — E55.9 VITAMIN D DEFICIENCY: ICD-10-CM

## 2020-04-22 ENCOUNTER — COMMUNICATION - HEALTHEAST (OUTPATIENT)
Dept: FAMILY MEDICINE | Facility: CLINIC | Age: 45
End: 2020-04-22

## 2020-05-12 ENCOUNTER — COMMUNICATION - HEALTHEAST (OUTPATIENT)
Dept: FAMILY MEDICINE | Facility: CLINIC | Age: 45
End: 2020-05-12

## 2020-05-12 DIAGNOSIS — E11.8 TYPE 2 DIABETES MELLITUS WITH COMPLICATION, WITH LONG-TERM CURRENT USE OF INSULIN (H): ICD-10-CM

## 2020-05-12 DIAGNOSIS — Z79.4 TYPE 2 DIABETES MELLITUS WITH COMPLICATION, WITH LONG-TERM CURRENT USE OF INSULIN (H): ICD-10-CM

## 2020-05-18 ENCOUNTER — COMMUNICATION - HEALTHEAST (OUTPATIENT)
Dept: VASCULAR SURGERY | Facility: CLINIC | Age: 45
End: 2020-05-18

## 2020-05-27 ENCOUNTER — COMMUNICATION - HEALTHEAST (OUTPATIENT)
Dept: FAMILY MEDICINE | Facility: CLINIC | Age: 45
End: 2020-05-27

## 2020-05-28 ENCOUNTER — OFFICE VISIT - HEALTHEAST (OUTPATIENT)
Dept: VASCULAR SURGERY | Facility: CLINIC | Age: 45
End: 2020-05-28

## 2020-05-28 ENCOUNTER — RECORDS - HEALTHEAST (OUTPATIENT)
Dept: VASCULAR ULTRASOUND | Facility: CLINIC | Age: 45
End: 2020-05-28

## 2020-05-28 DIAGNOSIS — M79.604 PAIN IN RIGHT LEG: ICD-10-CM

## 2020-05-28 DIAGNOSIS — M79.605 PAIN IN LEFT LEG: ICD-10-CM

## 2020-05-28 DIAGNOSIS — I87.303 VENOUS HYPERTENSION OF LOWER EXTREMITY, BILATERAL: ICD-10-CM

## 2020-05-28 DIAGNOSIS — E11.42 DIABETIC PERIPHERAL NEUROPATHY ASSOCIATED WITH TYPE 2 DIABETES MELLITUS (H): ICD-10-CM

## 2020-05-28 DIAGNOSIS — I89.0 LYMPHEDEMA, NOT ELSEWHERE CLASSIFIED: ICD-10-CM

## 2020-05-28 DIAGNOSIS — I87.303 CHRONIC VENOUS HYPERTENSION (IDIOPATHIC) WITHOUT COMPLICATIONS OF BILATERAL LOWER EXTREMITY: ICD-10-CM

## 2020-05-28 DIAGNOSIS — I89.0 ACQUIRED LYMPHEDEMA OF LEG: ICD-10-CM

## 2020-05-28 DIAGNOSIS — M79.605 LEG PAIN, BILATERAL: ICD-10-CM

## 2020-05-28 DIAGNOSIS — M79.89 LEG SWELLING: ICD-10-CM

## 2020-05-28 DIAGNOSIS — M79.604 LEG PAIN, BILATERAL: ICD-10-CM

## 2020-05-28 DIAGNOSIS — E11.42 TYPE 2 DIABETES MELLITUS WITH DIABETIC POLYNEUROPATHY (H): ICD-10-CM

## 2020-05-28 DIAGNOSIS — M79.89 OTHER SPECIFIED SOFT TISSUE DISORDERS: ICD-10-CM

## 2020-05-28 DIAGNOSIS — I87.2 VENOUS INSUFFICIENCY (CHRONIC) (PERIPHERAL): ICD-10-CM

## 2020-05-28 DIAGNOSIS — E66.01 MORBID (SEVERE) OBESITY DUE TO EXCESS CALORIES (H): ICD-10-CM

## 2020-05-28 DIAGNOSIS — E66.01 MORBID OBESITY WITH BMI OF 60.0-69.9, ADULT (H): ICD-10-CM

## 2020-05-28 DIAGNOSIS — I87.2 VENOUS INSUFFICIENCY OF BOTH LOWER EXTREMITIES: ICD-10-CM

## 2020-05-28 ASSESSMENT — MIFFLIN-ST. JEOR: SCORE: 2180.38

## 2020-06-25 ENCOUNTER — RECORDS - HEALTHEAST (OUTPATIENT)
Dept: ADMINISTRATIVE | Facility: OTHER | Age: 45
End: 2020-06-25

## 2020-07-16 ENCOUNTER — RECORDS - HEALTHEAST (OUTPATIENT)
Dept: ADMINISTRATIVE | Facility: OTHER | Age: 45
End: 2020-07-16

## 2020-07-17 ENCOUNTER — OFFICE VISIT - HEALTHEAST (OUTPATIENT)
Dept: FAMILY MEDICINE | Facility: CLINIC | Age: 45
End: 2020-07-17

## 2020-07-17 DIAGNOSIS — E66.813 CLASS 3 SEVERE OBESITY DUE TO EXCESS CALORIES WITH SERIOUS COMORBIDITY AND BODY MASS INDEX (BMI) OF 50.0 TO 59.9 IN ADULT (H): ICD-10-CM

## 2020-07-17 DIAGNOSIS — E11.43 DIABETIC GASTROPARESIS (H): ICD-10-CM

## 2020-07-17 DIAGNOSIS — D50.0 IRON DEFICIENCY ANEMIA DUE TO CHRONIC BLOOD LOSS: ICD-10-CM

## 2020-07-17 DIAGNOSIS — J30.89 NON-SEASONAL ALLERGIC RHINITIS, UNSPECIFIED TRIGGER: ICD-10-CM

## 2020-07-17 DIAGNOSIS — E66.01 CLASS 3 SEVERE OBESITY DUE TO EXCESS CALORIES WITH SERIOUS COMORBIDITY AND BODY MASS INDEX (BMI) OF 50.0 TO 59.9 IN ADULT (H): ICD-10-CM

## 2020-07-17 DIAGNOSIS — Z79.4 TYPE 2 DIABETES MELLITUS WITH DIABETIC NEUROPATHY, WITH LONG-TERM CURRENT USE OF INSULIN (H): ICD-10-CM

## 2020-07-17 DIAGNOSIS — F33.0 MAJOR DEPRESSIVE DISORDER, RECURRENT EPISODE, MILD (H): ICD-10-CM

## 2020-07-17 DIAGNOSIS — G63 POLYNEUROPATHY ASSOCIATED WITH UNDERLYING DISEASE (H): ICD-10-CM

## 2020-07-17 DIAGNOSIS — K31.84 DIABETIC GASTROPARESIS (H): ICD-10-CM

## 2020-07-17 DIAGNOSIS — E55.9 VITAMIN D DEFICIENCY: ICD-10-CM

## 2020-07-17 DIAGNOSIS — E11.40 TYPE 2 DIABETES MELLITUS WITH DIABETIC NEUROPATHY, WITH LONG-TERM CURRENT USE OF INSULIN (H): ICD-10-CM

## 2020-07-17 LAB
ALBUMIN SERPL-MCNC: 3.1 G/DL (ref 3.5–5)
ALP SERPL-CCNC: 79 U/L (ref 45–120)
ALT SERPL W P-5'-P-CCNC: 12 U/L (ref 0–45)
ANION GAP SERPL CALCULATED.3IONS-SCNC: 11 MMOL/L (ref 5–18)
AST SERPL W P-5'-P-CCNC: 13 U/L (ref 0–40)
BILIRUB SERPL-MCNC: 0.8 MG/DL (ref 0–1)
BUN SERPL-MCNC: 10 MG/DL (ref 8–22)
CALCIUM SERPL-MCNC: 8.7 MG/DL (ref 8.5–10.5)
CHLORIDE BLD-SCNC: 102 MMOL/L (ref 98–107)
CO2 SERPL-SCNC: 24 MMOL/L (ref 22–31)
CREAT SERPL-MCNC: 0.64 MG/DL (ref 0.6–1.1)
ERYTHROCYTE [DISTWIDTH] IN BLOOD BY AUTOMATED COUNT: 16.4 % (ref 11–14.5)
FERRITIN SERPL-MCNC: 4 NG/ML (ref 10–130)
GFR SERPL CREATININE-BSD FRML MDRD: >60 ML/MIN/1.73M2
GLUCOSE BLD-MCNC: 105 MG/DL (ref 70–125)
HBA1C MFR BLD: 7.9 % (ref 3.5–6)
HCT VFR BLD AUTO: 33.4 % (ref 35–47)
HGB BLD-MCNC: 10.7 G/DL (ref 12–16)
MCH RBC QN AUTO: 22.6 PG (ref 27–34)
MCHC RBC AUTO-ENTMCNC: 32 G/DL (ref 32–36)
MCV RBC AUTO: 71 FL (ref 80–100)
PLATELET # BLD AUTO: 517 THOU/UL (ref 140–440)
PMV BLD AUTO: 7.8 FL (ref 7–10)
POTASSIUM BLD-SCNC: 4.4 MMOL/L (ref 3.5–5)
PROT SERPL-MCNC: 7.5 G/DL (ref 6–8)
RBC # BLD AUTO: 4.73 MILL/UL (ref 3.8–5.4)
SODIUM SERPL-SCNC: 137 MMOL/L (ref 136–145)
WBC: 9.6 THOU/UL (ref 4–11)

## 2020-07-17 RX ORDER — MULTIVITAMIN WITH IRON
1 TABLET,CHEWABLE ORAL DAILY
Qty: 100 TABLET | Refills: 4 | Status: SHIPPED | OUTPATIENT
Start: 2020-07-17 | End: 2022-07-22

## 2020-07-17 RX ORDER — CETIRIZINE HYDROCHLORIDE, PSEUDOEPHEDRINE HYDROCHLORIDE 5; 120 MG/1; MG/1
1 TABLET, FILM COATED, EXTENDED RELEASE ORAL 2 TIMES DAILY
Qty: 60 TABLET | Refills: 5 | Status: SHIPPED | OUTPATIENT
Start: 2020-07-17 | End: 2022-07-22

## 2020-07-17 ASSESSMENT — PATIENT HEALTH QUESTIONNAIRE - PHQ9: SUM OF ALL RESPONSES TO PHQ QUESTIONS 1-9: 13

## 2020-07-20 LAB — 25(OH)D3 SERPL-MCNC: 20.2 NG/ML (ref 30–80)

## 2020-07-29 ENCOUNTER — COMMUNICATION - HEALTHEAST (OUTPATIENT)
Dept: FAMILY MEDICINE | Facility: CLINIC | Age: 45
End: 2020-07-29

## 2020-08-05 ENCOUNTER — COMMUNICATION - HEALTHEAST (OUTPATIENT)
Dept: FAMILY MEDICINE | Facility: CLINIC | Age: 45
End: 2020-08-05

## 2020-08-05 DIAGNOSIS — T74.11XD: ICD-10-CM

## 2020-08-07 ENCOUNTER — COMMUNICATION - HEALTHEAST (OUTPATIENT)
Dept: NURSING | Facility: CLINIC | Age: 45
End: 2020-08-07

## 2020-09-18 ENCOUNTER — COMMUNICATION - HEALTHEAST (OUTPATIENT)
Dept: FAMILY MEDICINE | Facility: CLINIC | Age: 45
End: 2020-09-18

## 2020-10-02 ENCOUNTER — OFFICE VISIT - HEALTHEAST (OUTPATIENT)
Dept: FAMILY MEDICINE | Facility: CLINIC | Age: 45
End: 2020-10-02

## 2020-10-02 DIAGNOSIS — R73.9 HYPERGLYCEMIA: ICD-10-CM

## 2020-10-02 DIAGNOSIS — R35.89 POLYURIA: ICD-10-CM

## 2020-10-05 ENCOUNTER — COMMUNICATION - HEALTHEAST (OUTPATIENT)
Dept: FAMILY MEDICINE | Facility: CLINIC | Age: 45
End: 2020-10-05

## 2020-10-06 ENCOUNTER — COMMUNICATION - HEALTHEAST (OUTPATIENT)
Dept: SCHEDULING | Facility: CLINIC | Age: 45
End: 2020-10-06

## 2020-10-08 ENCOUNTER — OFFICE VISIT - HEALTHEAST (OUTPATIENT)
Dept: FAMILY MEDICINE | Facility: CLINIC | Age: 45
End: 2020-10-08

## 2020-10-08 DIAGNOSIS — Z79.4 TYPE 2 DIABETES MELLITUS WITH DIABETIC NEUROPATHY, WITH LONG-TERM CURRENT USE OF INSULIN (H): ICD-10-CM

## 2020-10-08 DIAGNOSIS — R32 URINARY INCONTINENCE, UNSPECIFIED TYPE: ICD-10-CM

## 2020-10-08 DIAGNOSIS — L02.01 ABSCESS OF FACE: ICD-10-CM

## 2020-10-08 DIAGNOSIS — E11.40 TYPE 2 DIABETES MELLITUS WITH DIABETIC NEUROPATHY, WITH LONG-TERM CURRENT USE OF INSULIN (H): ICD-10-CM

## 2020-10-08 LAB
ALBUMIN UR-MCNC: NEGATIVE MG/DL
APPEARANCE UR: CLEAR
BACTERIA #/AREA URNS HPF: ABNORMAL HPF
BILIRUB UR QL STRIP: NEGATIVE
COLOR UR AUTO: YELLOW
GLUCOSE UR STRIP-MCNC: ABNORMAL MG/DL
HGB UR QL STRIP: ABNORMAL
KETONES UR STRIP-MCNC: ABNORMAL MG/DL
LEUKOCYTE ESTERASE UR QL STRIP: ABNORMAL
NITRATE UR QL: NEGATIVE
PH UR STRIP: 7 [PH] (ref 5–8)
RBC #/AREA URNS AUTO: ABNORMAL HPF
SP GR UR STRIP: 1.01 (ref 1–1.03)
SQUAMOUS #/AREA URNS AUTO: ABNORMAL LPF
UROBILINOGEN UR STRIP-ACNC: ABNORMAL
WBC #/AREA URNS AUTO: ABNORMAL HPF
YEAST #/AREA URNS HPF: ABNORMAL HPF

## 2020-10-09 LAB — BACTERIA SPEC CULT: NORMAL

## 2020-10-11 LAB — BACTERIA SPEC CULT: ABNORMAL

## 2020-10-15 ENCOUNTER — COMMUNICATION - HEALTHEAST (OUTPATIENT)
Dept: SCHEDULING | Facility: CLINIC | Age: 45
End: 2020-10-15

## 2020-10-21 ENCOUNTER — COMMUNICATION - HEALTHEAST (OUTPATIENT)
Dept: SCHEDULING | Facility: CLINIC | Age: 45
End: 2020-10-21

## 2020-10-21 ENCOUNTER — COMMUNICATION - HEALTHEAST (OUTPATIENT)
Dept: FAMILY MEDICINE | Facility: CLINIC | Age: 45
End: 2020-10-21

## 2020-10-22 ENCOUNTER — COMMUNICATION - HEALTHEAST (OUTPATIENT)
Dept: SCHEDULING | Facility: CLINIC | Age: 45
End: 2020-10-22

## 2020-10-22 ENCOUNTER — OFFICE VISIT - HEALTHEAST (OUTPATIENT)
Dept: FAMILY MEDICINE | Facility: CLINIC | Age: 45
End: 2020-10-22

## 2020-10-22 DIAGNOSIS — Z79.4 TYPE 2 DIABETES MELLITUS WITH DIABETIC NEUROPATHY, WITH LONG-TERM CURRENT USE OF INSULIN (H): ICD-10-CM

## 2020-10-22 DIAGNOSIS — N89.8 VAGINAL ITCHING: ICD-10-CM

## 2020-10-22 DIAGNOSIS — L02.01 ABSCESS OF FACE: ICD-10-CM

## 2020-10-22 DIAGNOSIS — E11.40 TYPE 2 DIABETES MELLITUS WITH DIABETIC NEUROPATHY, WITH LONG-TERM CURRENT USE OF INSULIN (H): ICD-10-CM

## 2020-10-22 RX ORDER — MICONAZOLE NITRATE 20 MG/G
CREAM TOPICAL
Qty: 28 G | Refills: 0 | Status: SHIPPED | OUTPATIENT
Start: 2020-10-22 | End: 2022-07-22

## 2020-10-23 ENCOUNTER — COMMUNICATION - HEALTHEAST (OUTPATIENT)
Dept: FAMILY MEDICINE | Facility: CLINIC | Age: 45
End: 2020-10-23

## 2020-10-23 DIAGNOSIS — N89.8 VAGINAL ITCHING: ICD-10-CM

## 2020-11-03 ENCOUNTER — RECORDS - HEALTHEAST (OUTPATIENT)
Dept: ADMINISTRATIVE | Facility: OTHER | Age: 45
End: 2020-11-03

## 2020-11-12 ENCOUNTER — RECORDS - HEALTHEAST (OUTPATIENT)
Dept: HEALTH INFORMATION MANAGEMENT | Facility: CLINIC | Age: 45
End: 2020-11-12

## 2020-12-15 ENCOUNTER — COMMUNICATION - HEALTHEAST (OUTPATIENT)
Dept: FAMILY MEDICINE | Facility: CLINIC | Age: 45
End: 2020-12-15

## 2020-12-15 DIAGNOSIS — J30.89 PERENNIAL ALLERGIC RHINITIS: ICD-10-CM

## 2021-01-05 ENCOUNTER — COMMUNICATION - HEALTHEAST (OUTPATIENT)
Dept: FAMILY MEDICINE | Facility: CLINIC | Age: 46
End: 2021-01-05

## 2021-01-05 DIAGNOSIS — L29.9 ITCHING: ICD-10-CM

## 2021-01-06 RX ORDER — HYDROXYZINE HYDROCHLORIDE 50 MG/1
TABLET, FILM COATED ORAL
Qty: 120 TABLET | Refills: 11 | Status: SHIPPED | OUTPATIENT
Start: 2021-01-06 | End: 2022-07-22

## 2021-01-18 ENCOUNTER — COMMUNICATION - HEALTHEAST (OUTPATIENT)
Dept: FAMILY MEDICINE | Facility: CLINIC | Age: 46
End: 2021-01-18

## 2021-01-18 DIAGNOSIS — F33.0 MAJOR DEPRESSIVE DISORDER, RECURRENT EPISODE, MILD (H): ICD-10-CM

## 2021-02-11 ENCOUNTER — COMMUNICATION - HEALTHEAST (OUTPATIENT)
Dept: FAMILY MEDICINE | Facility: CLINIC | Age: 46
End: 2021-02-11

## 2021-02-11 DIAGNOSIS — F33.0 MAJOR DEPRESSIVE DISORDER, RECURRENT EPISODE, MILD (H): ICD-10-CM

## 2021-02-11 DIAGNOSIS — E55.9 VITAMIN D DEFICIENCY: ICD-10-CM

## 2021-02-11 RX ORDER — ERGOCALCIFEROL 1.25 MG/1
CAPSULE ORAL
Qty: 26 CAPSULE | Refills: 4 | Status: SHIPPED | OUTPATIENT
Start: 2021-02-11 | End: 2022-07-22

## 2021-02-12 RX ORDER — BUPROPION HYDROCHLORIDE 150 MG/1
150 TABLET ORAL DAILY
Qty: 90 TABLET | Refills: 2 | Status: SHIPPED | OUTPATIENT
Start: 2021-02-12 | End: 2021-11-10

## 2021-02-26 ENCOUNTER — RECORDS - HEALTHEAST (OUTPATIENT)
Dept: ADMINISTRATIVE | Facility: OTHER | Age: 46
End: 2021-02-26

## 2021-03-24 ENCOUNTER — COMMUNICATION - HEALTHEAST (OUTPATIENT)
Dept: FAMILY MEDICINE | Facility: CLINIC | Age: 46
End: 2021-03-24

## 2021-04-14 ENCOUNTER — COMMUNICATION - HEALTHEAST (OUTPATIENT)
Dept: FAMILY MEDICINE | Facility: CLINIC | Age: 46
End: 2021-04-14

## 2021-05-06 ENCOUNTER — OFFICE VISIT - HEALTHEAST (OUTPATIENT)
Dept: FAMILY MEDICINE | Facility: CLINIC | Age: 46
End: 2021-05-06

## 2021-05-06 DIAGNOSIS — F33.0 MAJOR DEPRESSIVE DISORDER, RECURRENT EPISODE, MILD (H): ICD-10-CM

## 2021-05-06 DIAGNOSIS — K21.00 REFLUX ESOPHAGITIS: ICD-10-CM

## 2021-05-06 DIAGNOSIS — D50.0 IRON DEFICIENCY ANEMIA DUE TO CHRONIC BLOOD LOSS: ICD-10-CM

## 2021-05-06 DIAGNOSIS — E66.01 CLASS 3 SEVERE OBESITY DUE TO EXCESS CALORIES WITH SERIOUS COMORBIDITY AND BODY MASS INDEX (BMI) OF 50.0 TO 59.9 IN ADULT (H): ICD-10-CM

## 2021-05-06 DIAGNOSIS — E55.9 VITAMIN D DEFICIENCY: ICD-10-CM

## 2021-05-06 DIAGNOSIS — E11.40 TYPE 2 DIABETES MELLITUS WITH DIABETIC NEUROPATHY, WITH LONG-TERM CURRENT USE OF INSULIN (H): ICD-10-CM

## 2021-05-06 DIAGNOSIS — E11.43 DIABETIC GASTROPARESIS (H): ICD-10-CM

## 2021-05-06 DIAGNOSIS — K31.84 DIABETIC GASTROPARESIS (H): ICD-10-CM

## 2021-05-06 DIAGNOSIS — G25.81 RESTLESS LEGS SYNDROME (RLS): ICD-10-CM

## 2021-05-06 DIAGNOSIS — R23.4 SCAB: ICD-10-CM

## 2021-05-06 DIAGNOSIS — Z79.4 TYPE 2 DIABETES MELLITUS WITH DIABETIC NEUROPATHY, WITH LONG-TERM CURRENT USE OF INSULIN (H): ICD-10-CM

## 2021-05-06 DIAGNOSIS — E66.813 CLASS 3 SEVERE OBESITY DUE TO EXCESS CALORIES WITH SERIOUS COMORBIDITY AND BODY MASS INDEX (BMI) OF 50.0 TO 59.9 IN ADULT (H): ICD-10-CM

## 2021-05-06 DIAGNOSIS — G63 POLYNEUROPATHY ASSOCIATED WITH UNDERLYING DISEASE (H): ICD-10-CM

## 2021-05-06 DIAGNOSIS — Z11.59 ENCOUNTER FOR HEPATITIS C SCREENING TEST FOR LOW RISK PATIENT: ICD-10-CM

## 2021-05-06 DIAGNOSIS — J30.89 PERENNIAL ALLERGIC RHINITIS: ICD-10-CM

## 2021-05-06 DIAGNOSIS — R05.9 COUGH: ICD-10-CM

## 2021-05-06 LAB
ALBUMIN SERPL-MCNC: 2.7 G/DL (ref 3.5–5)
ALP SERPL-CCNC: 116 U/L (ref 45–120)
ALT SERPL W P-5'-P-CCNC: 14 U/L (ref 0–45)
ANION GAP SERPL CALCULATED.3IONS-SCNC: 13 MMOL/L (ref 5–18)
AST SERPL W P-5'-P-CCNC: 11 U/L (ref 0–40)
BILIRUB SERPL-MCNC: 0.4 MG/DL (ref 0–1)
BUN SERPL-MCNC: 8 MG/DL (ref 8–22)
CALCIUM SERPL-MCNC: 8.2 MG/DL (ref 8.5–10.5)
CHLORIDE BLD-SCNC: 102 MMOL/L (ref 98–107)
CO2 SERPL-SCNC: 23 MMOL/L (ref 22–31)
CREAT SERPL-MCNC: 0.64 MG/DL (ref 0.6–1.1)
ERYTHROCYTE [DISTWIDTH] IN BLOOD BY AUTOMATED COUNT: 18.8 % (ref 11–14.5)
FERRITIN SERPL-MCNC: 11 NG/ML (ref 10–130)
GFR SERPL CREATININE-BSD FRML MDRD: >60 ML/MIN/1.73M2
GLUCOSE BLD-MCNC: 269 MG/DL (ref 70–125)
HBA1C MFR BLD: 12.1 %
HCT VFR BLD AUTO: 37.2 % (ref 35–47)
HGB BLD-MCNC: 11.3 G/DL (ref 12–16)
LDLC SERPL CALC-MCNC: 120 MG/DL
MCH RBC QN AUTO: 22.1 PG (ref 27–34)
MCHC RBC AUTO-ENTMCNC: 30.4 G/DL (ref 32–36)
MCV RBC AUTO: 73 FL (ref 80–100)
PLATELET # BLD AUTO: 481 THOU/UL (ref 140–440)
PMV BLD AUTO: 9.4 FL (ref 7–10)
POTASSIUM BLD-SCNC: 4.3 MMOL/L (ref 3.5–5)
PROT SERPL-MCNC: 7 G/DL (ref 6–8)
RBC # BLD AUTO: 5.12 MILL/UL (ref 3.8–5.4)
SODIUM SERPL-SCNC: 138 MMOL/L (ref 136–145)
WBC: 12 THOU/UL (ref 4–11)

## 2021-05-06 RX ORDER — PROMETHAZINE HYDROCHLORIDE 12.5 MG/1
12.5 TABLET ORAL DAILY PRN
Qty: 90 TABLET | Refills: 4 | Status: SHIPPED | OUTPATIENT
Start: 2021-05-06 | End: 2022-07-22

## 2021-05-06 RX ORDER — MONTELUKAST SODIUM 10 MG/1
10 TABLET ORAL DAILY PRN
Qty: 90 TABLET | Refills: 4 | Status: SHIPPED | OUTPATIENT
Start: 2021-05-06 | End: 2022-07-22

## 2021-05-06 RX ORDER — PANTOPRAZOLE SODIUM 40 MG/1
40 TABLET, DELAYED RELEASE ORAL DAILY
Qty: 90 TABLET | Refills: 4 | Status: SHIPPED | OUTPATIENT
Start: 2021-05-06 | End: 2021-07-24

## 2021-05-06 RX ORDER — HYDROCORTISONE 25 MG/G
OINTMENT TOPICAL
Qty: 30 G | Refills: 11 | Status: SHIPPED | OUTPATIENT
Start: 2021-05-06 | End: 2022-07-22

## 2021-05-06 RX ORDER — INSULIN DEGLUDEC 200 U/ML
50 INJECTION, SOLUTION SUBCUTANEOUS AT BEDTIME
Status: SHIPPED | COMMUNITY
Start: 2021-02-26 | End: 2022-07-14

## 2021-05-06 RX ORDER — IBUPROFEN 800 MG/1
800 TABLET, FILM COATED ORAL DAILY
Qty: 90 TABLET | Refills: 3 | Status: SHIPPED | OUTPATIENT
Start: 2021-05-06 | End: 2022-05-19

## 2021-05-06 ASSESSMENT — MIFFLIN-ST. JEOR: SCORE: 2179.25

## 2021-05-07 LAB
25(OH)D3 SERPL-MCNC: 13.5 NG/ML (ref 30–80)
HCV AB SERPL QL IA: NEGATIVE

## 2021-05-10 ENCOUNTER — COMMUNICATION - HEALTHEAST (OUTPATIENT)
Dept: PHARMACY | Facility: CLINIC | Age: 46
End: 2021-05-10

## 2021-05-11 ENCOUNTER — COMMUNICATION - HEALTHEAST (OUTPATIENT)
Dept: PHARMACY | Facility: CLINIC | Age: 46
End: 2021-05-11

## 2021-05-19 ENCOUNTER — COMMUNICATION - HEALTHEAST (OUTPATIENT)
Dept: FAMILY MEDICINE | Facility: CLINIC | Age: 46
End: 2021-05-19

## 2021-05-26 ASSESSMENT — PATIENT HEALTH QUESTIONNAIRE - PHQ9: SUM OF ALL RESPONSES TO PHQ QUESTIONS 1-9: 4

## 2021-05-27 VITALS
HEIGHT: 61 IN | SYSTOLIC BLOOD PRESSURE: 130 MMHG | HEART RATE: 109 BPM | TEMPERATURE: 97.7 F | WEIGHT: 293 LBS | DIASTOLIC BLOOD PRESSURE: 79 MMHG | BODY MASS INDEX: 55.32 KG/M2

## 2021-05-27 ASSESSMENT — PATIENT HEALTH QUESTIONNAIRE - PHQ9: SUM OF ALL RESPONSES TO PHQ QUESTIONS 1-9: 13

## 2021-05-27 NOTE — PROGRESS NOTES
For New Compression they will need a new order.     For Diabetic shoes they will need a new order for the shoes and inserts.   A face to face visit, Diabetes management visit, and a diabetic foot exam.

## 2021-05-27 NOTE — TELEPHONE ENCOUNTER
Patient Returning Call  Reason for call:  Patient returning call  Information relayed to patient:  Yes as instructed and patient is requesting these results to be mailed to her home address.  Patient has additional questions:  Yes  If YES, what are your questions/concerns:  Patient asked If I am to stop the Ibuprofen what am I going to do for my joint pain? Ibuprofen 800 MG works for me and I can not have anything with codeine.    Okay to leave a detailed message?: Yes

## 2021-05-27 NOTE — PROGRESS NOTES
Called and spoke to Marleny Roth from [employer]. She stated that Janet just got approved by Medicaid yesterday. [facility] is now waiting for the order to come in. The scooter should be ready by the end of next week.

## 2021-05-27 NOTE — TELEPHONE ENCOUNTER
----- Message from Jj Najera MD sent at 3/25/2019  4:56 PM CDT -----  Please call patient.  Tell patient:  Labs show low iron anemia.    Stop ibuprofen.  Use omeprazole daily.  Eat more meat and/or otc iron supplement and check back with Dr Munoz when she is available to sort things out.

## 2021-05-27 NOTE — PROGRESS NOTES
[facility] Office Visit    Chief Complaint:  Chief Complaint   Patient presents with     Hospital Visit Follow Up     [place] 04/08/2019-04/10/2019 - Edinsonmesis         Assessment/Plan:  1. Hospital discharge follow-up  Plan as outlined below.  hgb stable.  F/u with EGD and colonoscopy as scheduled in July with preop in June.    - HM2(CBC w/o Differential)    2. Microcytic anemia  Has been recommended to get colonoscopy and schededuled at [facility] in July.  Pt not able to tolerate standard prep.  We did review options with GI and she can do either Golytely prep and Moviprep.  Pt has preop in June to prepare for this.  Continue mv with iron-pt has difficulty tolerating so will try children's chewable next if covered by insurance.    Lab Results   Component Value Date    HGB 11.0 (L) 04/15/2019     - pediatric multivit-iron-min Chew; Chew 1 tablet daily.  Dispense: 100 each; Refill: 4  - HM2(CBC w/o Differential)    3. Hematemesis with nausea  Pt has been advised to get EGD- scheduled for July with preop in June.  Resolved now.  Continue high dose ppi daily- reviewed taking on empty stomach.     4. Osteoarthrosis Of The Knee  Difficult to control due to imobility and morbid obesity.  NSAIDs contraindicated now with GI bleed.  Trial of tramadol no more than once a day and not on daily basis for more severe pain.  Continue use of motorized scooter.    - traMADol (ULTRAM) 50 mg tablet; Take 1 tablet (50 mg total) by mouth daily as needed for pain.  Dispense: 15 tablet; Refill: 0    5. Restless Legs Syndrome  contiue use of mirapex two times a day 0.5mg along with iron supplementation in form of childrens MVI with iron.    - pediatric multivit-iron-min Chew; Chew 1 tablet daily.  Dispense: 100 each; Refill: 4    6.  Type 2 diabetes mellitus with complication, without long-term current use of insulin (H)  Addressed smoking status and aspirin therapy.  Recommended annual eye exam and dental cares.  Reviewed foot cares and foot exam.  Blood pressure and lipid management reviewed today.  Vaccines reviewed and updated.  Plan for glucose management includes ongoing focus on healthy diabetic diet and increased activity, managed by [facility] per pt trust/preference.  Pt in need of annual diabetic shoes with inserts ordered today through [employer].  Pt has food deformity including flat feet, plantar faciitis, callous formation along with her edema and morbid obesity putting her at increased risk for diabetic foot ulcer without proper foot wear.      Labs ordered as below including:     Lab Results   Component Value Date    HGBA1C 8.6 (H) 01/29/2019   , No results found for: LDL,   Lab Results   Component Value Date    CREATININE 0.70 04/08/2019     7. Lymphedema of both lower extremities  will order custom seamed knee high compression stockings 2pair/year with [employer] on annual basis.       Return in about 2 months (around 6/28/2019) for preop, Annual physical.  The following high BMI interventions were performed this visit: encouragement to exercise and lifestyle education regarding diet    Patient Education/AVS:  Patient Instructions   Try children's chewable vitamin for your iron- call me if insurance doesn't cover this    Take tramadol 1 every couple of days as needed for severe pain only    We will contact [employer] about your shoes and stockings    We will conact [facility] about a prep that you can tolerate    See me back in June for your preop and physical       HPI:   Mariola Rdz is a 43 y.o. female c/o hospital f/u for hematemasis felt to be related to violent vomiting epidode from acute viral illness.  Has been recommended to get a colonoscopy and EGD but needs a preop before she gets this- this has been tentatively scheduled for July 25th 130 at [facility] and pt notes that she has f/u with me 6/28/19.      Pt does get monthly periods but not heavy.  Eats meat on pizza or chicken on daily basis.       Still needs to get her diabetic shoes and stockings.  Did get scooter today.      Did try the cream once for itching and it made the itching worse.  Better now but same as before.  Still needs to schedule her derm appointment.      Pt has been taking a lot of  Ibuprofen 800mg for her pain- ankle and headache.  Not able to take ibuprofen due to stomach problems and GI bleed.  Pt always takes this with food.  Not on a daily basis.  Pt notes she only needs this 1-2 days/week and only once a day.  Can't afford tyelnol and notes this doesn't really help.  Trying to cut back.  Wondering if she can gets something else.      ROS:  Constitutional, CV, Resp, GI, , MSK, skin, neuro, psych all negative except as outlined in the HPI above and patient denies any other symptoms.      History summarized from1-2:d/c summary reviewed- hgb 10 stable, heme positive stool.  PPI and IVF and no more bleeding or vomiting.  Needs outpt egd and colonoscopy.  DM stable f/u with primary diabetes provider for on going care.    care Everywhere consent signed and records obtained  Radiology tests reviewed-1: ct abd/pelvis 4/2019 reviewed as part of her hospitalization  Lab tests reviewed-1: 2019  Medicine tests reviewed-1: EKG 4/8/19 reviewed Sinus tachycardia   Low voltage QRS   Borderline ECG   When compared with ECG of 13-SEP-2015 21:47,   T wave inversion no longer evident in Inferior leads   Confirmed by ANA JACOB MD LOC:WW ([id number]) on 4/8/2019 3:53:24 PM     Physical Exam:  /82 (Patient Site: Right Arm, Patient Position: Sitting, Cuff Size: Adult Regular) Comment (Patient Site): forearm  Pulse (!) 108   Temp 97.8  F (36.6  C) (Oral)   Wt (!) 326 lb (147.9 kg)   LMP 04/14/2019 (Exact Date)   Breastfeeding? No   BMI 59.63 kg/m   Body mass index is 59.63 kg/m . Patient's last menstrual period was 04/14/2019 (exact date).  Vital signs reviewed  Wt Readings from Last 3 Encounters:   04/15/19 (!) 326 lb (147.9 kg)   04/08/19  (!) 319 lb (144.7 kg)   03/21/19 (!) 334 lb (151.5 kg)     Social History     Tobacco Use   Smoking Status Never Smoker   Smokeless Tobacco Never Used   Tobacco Comment    family members smoke outside      Social History     Substance and Sexual Activity   Sexual Activity Yes     Partners: Male     Birth control/protection: Condom     No data recorded  PHQ-9 Total Score: 8 (12/7/2018  2:00 PM)    PHQ-2 Total Score: 4 (12/7/2018  2:00 PM)    No data recorded    All normal as below except abnormalities include: pt here with her motorized scooter today.  Appears well at her baseline.  No abdominal pain on seated exam.       Diabetic foot exam:  Normal inspection.  Callous formation is present bilaterally.  2+ pedal pulses bilaterally.  Sensation is intact to 10g monofilament.  No skin breakdown or ulceration noted.  Flat feet and pronation noted.  1+ edema bilaterally in both feet/ankles/calves.      General is a  43 y.o. female sitting comfortably in no apparent distress.   Neck: Supple without lymphadenopathy or thyromegally  CV: Regular rate and rhythm S1S2 without rubs, murmurs or gallops,   Lungs: Clear to auscultation bilaterally  Abd:  +BS, soft NT/ND,  No masses or organomegally  Extremities: Warm, 1+ Pedal and radial pulses bilaterally  Skin: No lesions or rashes noted  Neuro/MSK: Able to ambulate around the exam room with equal movement, strength and normal coordination of the upper and lower extremeties symmetrically    Results for orders placed or performed in visit on 04/15/19   HM2(CBC w/o Differential)   Result Value Ref Range    WBC 10.9 4.0 - 11.0 thou/uL    RBC 5.28 3.80 - 5.40 mill/uL    Hemoglobin 11.0 (L) 12.0 - 16.0 g/dL    Hematocrit 35.3 35.0 - 47.0 %    MCV 67 (L) 80 - 100 fL    MCH 20.8 (L) 27.0 - 34.0 pg    MCHC 31.2 (L) 32.0 - 36.0 g/dL    RDW 17.7 (H) 11.0 - 14.5 %    Platelets 665 (H) 140 - 440 thou/uL    MPV 7.7 7.0 - 10.0 fL       Med list and active problem list reviewed and updated as  "part of this encounter    Current Outpatient Medications on File Prior to Visit   Medication Sig Dispense Refill     BD ULTRA-FINE MILLI PEN NEEDLE 32 gauge x 5/32\" Ndle USE THREE TIMES DAILY 300 each 0     BLOOD SUGAR DIAGNOSTIC (TRUETEST TEST STRIPS MISC) Use As Directed. Test 3 times daily       buPROPion (WELLBUTRIN XL) 150 MG 24 hr tablet TAKE 1 TABLET(150 MG) BY MOUTH DAILY 90 tablet 3     ergocalciferol (ERGOCALCIFEROL) 50,000 unit capsule Take 1 capsule (50,000 Units total) by mouth 2 (two) times a week. 8 capsule 11     glipiZIDE (GLUCOTROL XL) 10 MG 24 hr tablet Take 1 tablet by mouth daily before breakfast  3     hydrOXYzine HCl (ATARAX) 50 MG tablet 1-2 every 6 hours as needed for itching 120 tablet 11     insulin glargine (BASAGLAR KWIKPEN U-100 INSULIN) 100 unit/mL (3 mL) pen Inject 50 Units under the skin at bedtime.              lancets (TRUEPLUS LANCETS) 33 gauge Misc Use As Directed. Check BG 3 tmes daily       metFORMIN (GLUCOPHAGE) 1000 MG tablet TAKE 1 TABLET(1000 MG) BY MOUTH TWICE DAILY WITH MEALS 180 tablet 11     metoclopramide (REGLAN) 5 MG tablet Take 1 tablet (5 mg total) by mouth daily as needed for nausea. 30 tablet 1     miscellaneous medical supply Misc Compression Stockings- custom knee high with zipper 4 each 0     montelukast (SINGULAIR) 10 mg tablet TAKE 1 TABLET(10 MG) BY MOUTH DAILY AT BEDTIME AS DIRECTED 90 tablet 4     NOVOFINE PLUS 32 gauge x 1/6\" Ndle Inject 1 each under the skin daily. With basaglar insulin       omeprazole (PRILOSEC) 40 MG capsule Take 1 capsule (40 mg total) by mouth daily before breakfast. 90 capsule 4     pioglitazone (ACTOS) 30 MG tablet Take 1 tablet by mouth daily  0     pramipexole (MIRAPEX) 0.5 MG tablet Take 1 tablet (0.5 mg total) by mouth 2 (two) times a day. 180 tablet 3     sertraline (ZOLOFT) 100 MG tablet TAKE 2 TABLETS BY MOUTH DAILY 180 tablet 2     TRULICITY 1.5 mg/0.5 mL PnIj INJECT 1.5 MG SUBCUTANEOUS ONCE A WEEK  1     No current " facility-administered medications on file prior to visit.          Giselle Munoz MD

## 2021-05-27 NOTE — PROGRESS NOTES
Hospital discharge follow up call to pt, no answer.  Left VM message reminding pt of appt on 4/15. RN will attempt call back at another time.

## 2021-05-27 NOTE — PATIENT INSTRUCTIONS - HE
Try children's chewable vitamin for your iron- call me if insurance doesn't cover this    Take tramadol 1 every couple of days as needed for severe pain only    We will contact [employer] about your shoes and stockings    We will conact [facility] about a prep that you can tolerate    See me back in June for your preop and physical

## 2021-05-27 NOTE — TELEPHONE ENCOUNTER
Yaya Munoz. I called Janet today to see if she wanted to wait until you returned to office or if she wanted DOD to look into this. Janet stated she wanted to wait for you and for your to advise upon your return. I instructed Janet to call back if the itching persisted and/or worsened. Thank you.

## 2021-05-27 NOTE — TELEPHONE ENCOUNTER
Fax received from [facility] pharmacy requesting Prior Authorization    Medication Name: Tramadol 50mg tablet    Insurance Plan: Medicare Part D   [facility] Phone Number: [phone]   Patient ID Number: [id number]    Please start PA process

## 2021-05-27 NOTE — TELEPHONE ENCOUNTER
Who is calling:  Patient   Reason for Call:  I was wondering about the my medications and the itching Giselle Munoz MD was going to looking into. Please return call to discuss  Date of last appointment with primary care: 3/21/19  Okay to leave a detailed message: Yes

## 2021-05-27 NOTE — TELEPHONE ENCOUNTER
Central PA team  [phone]  Pool: HE PA MED ([id number])          PA has been initiated.       PA form completed and faxed insurance via Cover My Meds     Key:  [id number]     Medication:  traMADol HCl 50MG OR TABS    Insurance: [employer]           Response will be received via fax and may take up to 5-10 business days depending on plan

## 2021-05-27 NOTE — TELEPHONE ENCOUNTER
Plan was to take prednisone 40mg (2 tabs at the same time daily for 5 days).  Also to use permethrin cream from neck to feet and leave on for 12-14 hours and then wash off.  Pt is also supposed to f/u with dermatology about her chronic itching that has gotten worse.

## 2021-05-27 NOTE — TELEPHONE ENCOUNTER
Hemoglobin has dropped from around 12 to 9+    This with other information suggests blood loss and ibuprofen is a likely cause.  For now, best to stop the ibuprofen.    Your liver tests are normal which means you can take up to 4000 mg of tylenol per day.    I think the blood loss is the more important issue at this time.   Check back with your pcp

## 2021-05-27 NOTE — TELEPHONE ENCOUNTER
DOD,   If YES, what are your questions/concerns:  Patient asked If I am to stop the Ibuprofen what am I going to do for my joint pain? Ibuprofen 800 MG works for me and I can not have anything with codeine  Please advise.

## 2021-05-27 NOTE — TELEPHONE ENCOUNTER
Medication Question or Clarification  Who is calling: Patient  What medication are you calling about? (include dose and sig) Tramadol and the Chewable Multivitamin  Who prescribed the medication?:  Alexander  What is your question/concern?:  Patient is stating that the tramadol and the Multi viatmin are not covered by insurance.  Please send over an alternative  Pharmacy:  [facility] [address] [id number] - [place], [place] - [address] AT [facility] [address]  Okay to leave a detailed message?: Yes  Site CMT - Please call the pharmacy to obtain any additional needed information.

## 2021-05-27 NOTE — TELEPHONE ENCOUNTER
Got a hold of the patient on the phone but she said she will call back later and hung up.    Was trying to clarify what her question was.  I wasn't given the chance to relay Dr. Munoz' message.  Please relay message upon call back.

## 2021-05-27 NOTE — PROGRESS NOTES
Second attempt to reach patient for hospital discharge follow up.  No answer.  RN has made two unsuccessful attempts to reach patient.   Encounter closed.

## 2021-05-27 NOTE — TELEPHONE ENCOUNTER
Called and spoke with pt , Message was given, pt understood, no further questions.  Patient already made an appointment with Dermatology.

## 2021-05-27 NOTE — TELEPHONE ENCOUNTER
Spoke to patient regarding a different encounter created. She stated that she will wait for dr. gonzalez to address this medication when dr. gonzalez is back in office on Thursday.

## 2021-05-28 ASSESSMENT — ANXIETY QUESTIONNAIRES: GAD7 TOTAL SCORE: 6

## 2021-05-28 NOTE — TELEPHONE ENCOUNTER
FYI - Status Update  Who is Calling: Patient  Update: Patient stated [facility], has not received her Rx for diabetic shoes and compression socks. Patient stated orders need to be faxed to [phone]. Order were faxed to [facility] per patient's request.  Okay to leave a detailed message?:  No return call needed

## 2021-05-28 NOTE — TELEPHONE ENCOUNTER
Who is calling:  Patient.  Reason for Call:  States that Subhash needs the face to face notes regarding her foot exam to be faxed to [phone] in order for her to get her diabetic shoes and socks.  Date of last appointment with primary care: 4/15/2019  Okay to leave a detailed message: Yes

## 2021-05-28 NOTE — TELEPHONE ENCOUNTER
Please call pharmacy- tramadol should be covered by insurance.     May need to buy the children's multivitamins over-the-counter

## 2021-05-28 NOTE — TELEPHONE ENCOUNTER
Called pharmacy they stated patient picked up tramadol rx on 4/18/2019. The pharmacist ran the Chewable Animal through insurance and that was also covered. Called and spoke with pt, inform her of message. No further questions.

## 2021-05-29 ENCOUNTER — COMMUNICATION - HEALTHEAST (OUTPATIENT)
Dept: FAMILY MEDICINE | Facility: CLINIC | Age: 46
End: 2021-05-29

## 2021-05-29 DIAGNOSIS — G25.81 RESTLESS LEGS SYNDROME (RLS): ICD-10-CM

## 2021-05-29 NOTE — TELEPHONE ENCOUNTER
I'm rouding next week and looks like I am pretty booked on Monday and Friday as is.      Have pt see a partner if possible and she is willing- has seen Viviana in the past.      If she refuses probably okay to see me in 2 weeks - 20 min opening if needed

## 2021-05-29 NOTE — TELEPHONE ENCOUNTER
RN cannot approve Refill Request    RN can NOT refill this medication med is not covered by policy/route to provider. Last office visit: 4/15/2019 Giselle Munoz MD Last Physical: 11/30/2015 Last MTM visit: Visit date not found Last visit same specialty: 4/15/2019 Giselle Munoz MD.  Next visit within 3 mo: Visit date not found  Next physical within 3 mo: Visit date not found      Yuko Payne, [facility] Triage/Med Refill 5/23/2019    Requested Prescriptions   Pending Prescriptions Disp Refills     ibuprofen (ADVIL,MOTRIN) 800 MG tablet [Pharmacy Med Name: IBUPROFEN 800MG TABLETS] 90 tablet 0     Sig: TAKE 1 TABLET BY MOUTH THREE TIMES DAILY WITH FOOD AS NEEDED       There is no refill protocol information for this order

## 2021-05-29 NOTE — TELEPHONE ENCOUNTER
Received a phone call from [facility]. They stated that a new order will be needed. The order written 5/2/2019 the strength 15-20 is not strong enough for patient.They need the new order to say customize knee high high compression stocking. [facility] worker will fax a new order To [phone]. Will look out for order and give to PCP to review. They are aware PCP is not here today and is okay to wait.

## 2021-05-29 NOTE — TELEPHONE ENCOUNTER
Left message #1 at [phone] to call clinic to schedule hospital follow up with another provider as PCP has nothing available next week, and if patient only wants to see PCP, next available is 2 weeks out for a 20 min slot, ok per PCP. See message below. Will try again tomorrow if appt has not been made.

## 2021-05-29 NOTE — ANESTHESIA POSTPROCEDURE EVALUATION
Patient: Mariola Rdz  ESOPHAGOGASTRODUODENOSCOPY (EGD), COLONOSCOPY  Anesthesia type: MAC    Patient location: PACU  Last vitals:   Vitals Value Taken Time   /75 5/29/2019  3:44 PM   Temp 36.8  C (98.2  F) 5/29/2019  3:44 PM   Pulse 116 5/29/2019  3:44 PM   Resp 17 5/29/2019  3:44 PM   SpO2 96 % 5/29/2019  3:44 PM     Post vital signs: stable  Level of consciousness: awake and responds to simple questions  Post-anesthesia pain: pain controlled  Post-anesthesia nausea and vomiting: no  Pulmonary: unassisted, return to baseline  Cardiovascular: stable and blood pressure at baseline  Hydration: adequate  Anesthetic events: no    QCDR Measures:  ASA# 11 - Kym-op Cardiac Arrest: ASA11B - Patient did NOT experience unanticipated cardiac arrest  ASA# 12 - Kym-op Mortality Rate: ASA12B - Patient did NOT die  ASA# 13 - PACU Re-Intubation Rate: ASA13B - Patient did NOT require a new airway mgmt  ASA# 10 - Composite Anes Safety: ASA10A - No serious adverse event    Additional Notes:

## 2021-05-29 NOTE — TELEPHONE ENCOUNTER
New Appointment Needed  What is the reason for the visit:  INF - [facility] - 5/28/19 thru 5/30/19 - hematemesis - f/u within 7 days  Inpatient/ED Follow Up Appt Request  At what hospital or facility were you seen?: [facility]  What is the reason you were seen?: Hematemsis  What date were you admitted?: date: 5/28  What date were you discharged?: date: 5/29  What was the recommended timeframe for your follow up appointment?: within 7 days  Provider Preference: PCP only  How soon do you need to be seen?: within 7 days  Waitlist offered?: No  Okay to leave a detailed message:  Yes

## 2021-05-29 NOTE — TELEPHONE ENCOUNTER
Refill Approved    Rx renewed per Medication Renewal Policy. Medication was last renewed on 5/21/2018 for 180/11.  Last OV 4/15/2019    Bertha Deras, [facility] Triage/Med Refill 5/31/2019     Requested Prescriptions   Pending Prescriptions Disp Refills     metFORMIN (GLUCOPHAGE) 1000 MG tablet [Pharmacy Med Name: METFORMIN 1000MG TABLETS] 180 tablet 0     Sig: TAKE 1 TABLET(1000 MG) BY MOUTH TWICE DAILY WITH MEALS       Metformin Refill Protocol Passed - 5/30/2019  3:49 AM        Passed - Blood pressure in last 12 months     BP Readings from Last 1 Encounters:   05/30/19 145/78             Passed - LFT or AST or ALT in last 12 months     Albumin   Date Value Ref Range Status   05/28/2019 2.9 (L) 3.5 - 5.0 g/dL Final     Bilirubin, Total   Date Value Ref Range Status   05/28/2019 0.8 0.0 - 1.0 mg/dL Final     Bilirubin, Direct   Date Value Ref Range Status   03/28/2014 0.5 <0.6 mg/dL Final     Alkaline Phosphatase   Date Value Ref Range Status   05/28/2019 79 45 - 120 U/L Final     AST   Date Value Ref Range Status   05/28/2019 18 0 - 40 U/L Final     ALT   Date Value Ref Range Status   05/28/2019 18 0 - 45 U/L Final     Protein, Total   Date Value Ref Range Status   05/28/2019 7.6 6.0 - 8.0 g/dL Final                Passed - GFR or Serum Creatinine in last 6 months     GFR MDRD Non Af Amer   Date Value Ref Range Status   05/28/2019 >60 >60 mL/min/1.73m2 Final     GFR MDRD Af Amer   Date Value Ref Range Status   05/28/2019 >60 >60 mL/min/1.73m2 Final             Passed - Visit with PCP or prescribing provider visit in last 6 months or next 3 months     Last office visit with prescriber/PCP: 4/15/2019 OR same dept: 4/15/2019 Giselle Munoz MD OR same specialty: 4/15/2019 Giselle Munoz MD Last physical: Visit date not found Last MTM visit: Visit date not found         Next appt within 3 mo: Visit date not found  Next physical within 3 mo: Visit date not found  Prescriber OR PCP: Giselle Munoz  MD  Last diagnosis associated with med order: 1. Type 2 diabetes mellitus with complication, with long-term current use of insulin (H)  - metFORMIN (GLUCOPHAGE) 1000 MG tablet [Pharmacy Med Name: METFORMIN 1000MG TABLETS]; TAKE 1 TABLET(1000 MG) BY MOUTH TWICE DAILY WITH MEALS  Dispense: 180 tablet; Refill: 0     If protocol passes may refill for 12 months if within 3 months of last provider visit (or a total of 15 months).           Passed - A1C in last 6 months     Hemoglobin A1c   Date Value Ref Range Status   05/28/2019 8.5 (H) 4.2 - 6.1 % Final               Passed - Microalbumin in last year      Microalbumin, Random Urine   Date Value Ref Range Status   03/21/2019 1.40 0.00 - 1.99 mg/dL Final

## 2021-05-29 NOTE — TELEPHONE ENCOUNTER
PCP provider has no openings in the next 7 days, does PCP want to double book patient or should patient schedule with another provider? Please advise?

## 2021-05-29 NOTE — ANESTHESIA PREPROCEDURE EVALUATION
Anesthesia Evaluation      Patient summary reviewed   No history of anesthetic complications     Airway   Mallampati: III  Neck ROM: full   Pulmonary - negative ROS and normal exam                          Cardiovascular - normal exam  Exercise tolerance: > or = 4 METS  ECG reviewed        Neuro/Psych    (+) neuromuscular disease (Peripheral neuropathy, DM),  depression,     Comments: Learning disability    Endo/Other    (+) diabetes mellitus type 2 using insulin, arthritis, obesity (BMI 60),      Comments: Anemia    GI/Hepatic/Renal    (+) GERD,       Comments: Hematemisis          Dental    (+) poor dentition                       Anesthesia Plan  Planned anesthetic: MAC    ASA 4     Anesthetic plan and risks discussed with: patient    Post-op plan: routine recovery

## 2021-05-29 NOTE — TELEPHONE ENCOUNTER
RN Triage:    Spoke with 43 yr old Janet, who c/o vomiting blood 2 days ago.    Pt states vomiting was triggered by coughing spell, but uncertain if related.    Pt reports large amount of blood in emesis; couple tablespoons to one cup.    Throat hurts after vomiting.    Has had 3 incidences so far of vomiting blood.    Denies blood in stool or black stools.    Denies difficulty breathing.    Denies fever.    More tired     Able to stand and walk.    Was told to call PCP for recurring symptoms.  Requesting to see PCP only.     PLAN:  Will consult with PCP regarding level of care OV or ED?  and ability to see in clinic today or other instructions.  Please advise.    Jeanette Holguin RN   [facility] RN Triage                Reason for Disposition    Coughed up > 1 tablespoon (15 ml) blood (Exception: blood-tinged sputum)    Protocols used: COUGH-A-OH

## 2021-05-29 NOTE — TELEPHONE ENCOUNTER
Spoke with patient and patient insisted on seeing PCP only. Scheduled patient on a 20 min slot for  6/7 @ 3:40pm. Completing task.

## 2021-05-29 NOTE — PROGRESS NOTES
[facility] Clinic Office Visit    Chief Complaint:  Chief Complaint   Patient presents with     Hospital Visit Follow Up         Assessment/Plan:  1. Hematemesis, presence of nausea not specified  Reviewed with pt 2 episodes of hematemasis now both stable and that if she does have another episode that it may be okay to stay at home and have it assessed in clinic the following day instead of ED/Hospitalization.  If pt is fearful or if she is having repeated episodes to present to ED.  With normal GI evaluation so far procede with camera study as recommended.  We will contact GI to see if this study can be started after 9am or if necessary to do early in am as this is difficult for patient.  Wonder about pulmonary source of bleeding as pt does note severe coughing that seems to trigger emesis and bleeding.  Hgb improved today.    - HM2(CBC w/o Differential)    2. Other iron deficiency anemia  Pt not interested in IV Iron due to difficulty getting to hospital for infusions on regular basis and fear of needles.  Tolerating iron in children's chewable vitamin daily and Hgb stable/increasing.  Consider Iron dextran infusion in hospital setting if needed in future.  Pt due for CPE in 2 weeks.        Return in about 2 weeks (around 6/21/2019) for Annual physical.    Patient Education/AVS:  There are no Patient Instructions on file for this visit.    HPI:   Mariola COULTER Rdz is a 43 y.o. female c/o f/u on recurrent hematemesis and recent hospitalization.  Has fu with GI to discuss pill swallow study.  Pt notes that the most recent episode she woke up in the middle of the night coughing and coughed so hard that she threw up all her food from her dinner the night before and there was a lot red blood mixed with it.     Currently doing the chewable children's vitamin one a day.  Doesn't eat meat daily.  Will have meat 1-2x/week due to cost.  Getting periods but did skip last month- neg UPT at hospital.  Periods are  not too heavy usually- has to change pads twice a day and not overnight.      ROS:  Constitutional, CV, Resp, GI, , MSK, skin, neuro, psych all negative except as outlined in the HPI above and patient denies any other symptoms.      History summarized from1-2:d/c summary from 5/23-5/30 reviewed.  Admitted for hematemasis with anemia. Normal EGD and colonoscopy.  Needs f/u with gI for pillcam and IV Iron.  hgb on d/c 9.4.  Not able to tolerate po iron.    Radiology tests reviewed-1: no Chest xray or chest ct.    Lab tests reviewed-1: Hgb on d/c 9.4, 8.9 on admission, stomach bx- normal, no h pylori.    Medicine tests reviewed-1: EGD/Colonoscopy 2019 reviewed- normal    Physical Exam:  /72 (Patient Site: Right Arm, Patient Position: Sitting, Cuff Size: Adult Regular) Comment (Patient Site): forearm  Pulse (!) 107   Temp 99.5  F (37.5  C) (Oral)   Resp 16   Wt (!) 326 lb (147.9 kg)   LMP 04/22/2019 (Within Weeks) Comment: Irregular  SpO2 95%   BMI 59.63 kg/m   Body mass index is 59.63 kg/m . Patient's last menstrual period was 04/22/2019 (within weeks).  Vital signs reviewed  Wt Readings from Last 3 Encounters:   06/07/19 (!) 326 lb (147.9 kg)   05/28/19 (!) 329 lb 3.2 oz (149.3 kg)   04/15/19 (!) 326 lb (147.9 kg)     Social History     Tobacco Use   Smoking Status Never Smoker   Smokeless Tobacco Never Used   Tobacco Comment    family members smoke outside      Social History     Substance and Sexual Activity   Sexual Activity Yes     Partners: Male     Birth control/protection: Condom     No data recorded  PHQ-9 Total Score: 12 (6/7/2019  4:22 PM)    PHQ-2 Total Score: 2 (6/7/2019  4:22 PM)    No data recorded    All normal as below except abnormalities include: pt appears well.  Has her motorized scooter at this point and very happy.  Normal throat exam by visual inspection- no obvious irritation/bleeding.  Normal lung exam.  Exam in seated position limiting abdominal exam.  General is a  43 y.o.  "female sitting comfortably in no apparent distress.   HEENT:  Eye, nasal, oral exams within normal   Neck: Supple without lymphadenopathy or thyromegally  CV: Regular rate and rhythm S1S2 without rubs, murmurs or gallops,   Lungs: Clear to auscultation bilaterally  Abd:  +BS, soft NT/ND,  No masses or organomegally  Extremities: Warm, stable edema in both legs to the knees  Skin: No lesions or rashes noted      Results for orders placed or performed in visit on 06/07/19   HM2(CBC w/o Differential)   Result Value Ref Range    WBC 10.1 4.0 - 11.0 thou/uL    RBC 5.05 3.80 - 5.40 mill/uL    Hemoglobin 10.8 (L) 12.0 - 16.0 g/dL    Hematocrit 34.2 (L) 35.0 - 47.0 %    MCV 68 (L) 80 - 100 fL    MCH 21.5 (L) 27.0 - 34.0 pg    MCHC 31.7 (L) 32.0 - 36.0 g/dL    RDW 16.6 (H) 11.0 - 14.5 %    Platelets 609 (H) 140 - 440 thou/uL    MPV 8.1 7.0 - 10.0 fL       Med list and active problem list reviewed and updated as part of this encounter    Current Outpatient Medications on File Prior to Visit   Medication Sig Dispense Refill     BD ULTRA-FINE MILLI PEN NEEDLE 32 gauge x 5/32\" Ndle USE THREE TIMES DAILY 300 each 0     BLOOD SUGAR DIAGNOSTIC (TRUETEST TEST STRIPS MISC) Use As Directed. Test 3 times daily       buPROPion (WELLBUTRIN XL) 150 MG 24 hr tablet TAKE 1 TABLET(150 MG) BY MOUTH DAILY 90 tablet 3     cetirizine (ZYRTEC) 10 MG tablet Take 10 mg by mouth 2 (two) times a day.       RONAN/IRON chewable tablet CSW 1 T PO D  7     CONTOUR NEXT TEST STRIPS strips TEST TID  3     ergocalciferol (ERGOCALCIFEROL) 50,000 unit capsule Take 1 capsule (50,000 Units total) by mouth 2 (two) times a week. 8 capsule 11     fluocinonide (LIDEX) 0.05 % cream ALBAN  BID      PRF  ITCHY  SPOTS    FOR  LESS  THAN  2  WEEKS  3     GAVILYTE-G 236-22.74-6.74 -5.86 gram solution        glipiZIDE (GLUCOTROL XL) 10 MG 24 hr tablet Take 1 tablet by mouth daily before breakfast  3     hydrOXYzine HCl (ATARAX) 50 MG tablet 1-2 every 6 hours as needed for " "itching 120 tablet 11     insulin glargine (BASAGLAR KWIKPEN U-100 INSULIN) 100 unit/mL (3 mL) pen Inject 50 Units under the skin at bedtime.              lancets (TRUEPLUS LANCETS) 33 gauge Misc Use As Directed. Check BG 3 tmes daily       metFORMIN (GLUCOPHAGE) 1000 MG tablet TAKE 1 TABLET(1000 MG) BY MOUTH TWICE DAILY WITH MEALS 180 tablet 3     metoclopramide (REGLAN) 5 MG tablet Take 1 tablet (5 mg total) by mouth daily as needed for nausea. 30 tablet 1     miscellaneous medical supply Misc Compression Stockings- custom knee high with zipper 4 each 0     montelukast (SINGULAIR) 10 mg tablet TAKE 1 TABLET(10 MG) BY MOUTH DAILY AT BEDTIME AS DIRECTED 90 tablet 4     NOVOFINE PLUS 32 gauge x 1/6\" Ndle Inject 1 each under the skin daily. With basaglar insulin       omeprazole (PRILOSEC) 40 MG capsule TAKE 1 CAPSULE(40 MG) BY MOUTH DAILY BEFORE BREAKFAST 90 capsule 3     pediatric multivit-iron-min Chew Chew 1 tablet daily. 100 each 4     pioglitazone (ACTOS) 30 MG tablet Take 1 tablet by mouth daily  0     sertraline (ZOLOFT) 100 MG tablet TAKE 2 TABLETS BY MOUTH DAILY 180 tablet 2     traMADol (ULTRAM) 50 mg tablet Take 1 tablet (50 mg total) by mouth daily as needed for pain. 15 tablet 0     TRULICITY 1.5 mg/0.5 mL PnIj INJECT 1.5 MG SUBCUTANEOUS ONCE A WEEK  1     No current facility-administered medications on file prior to visit.          Giselle Munoz MD    "

## 2021-05-29 NOTE — PROGRESS NOTES
[facility] Clinic Office Visit    Chief Complaint:  Chief Complaint   Patient presents with     Follow-up         Assessment/Plan:  1. Other iron deficiency anemia  Related to diet with poor iron intake.      2. Hematemesis with nausea  Unclear source or if this is even hematemasis.  Each episodes starts with a severe bout of coughing and pt notes she has been having increased coughing fits over past sevearl months.  Move forward with pulmonary/lung evaluation.  Normal CXR today per my review- will await official reading.  Move forward with lung CT.  hgb stable-pt to restart her multivitamin once she has completed her pill capsule study.    - HM2(CBC w/o Differential)  - XR Chest 2 Views; Future  - polyethylene glycol (GLYCOLAX) 17 gram/dose powder; Poor in 64 oz of gatoraid and drink over 4 hours the day before capsule study  Dispense: 116 g; Refill: 0  - Creatinine    Return in about 6 weeks (around 8/1/2019) for Annual physical.    Patient Education/AVS:  There are no Patient Instructions on file for this visit.    HPI:   Mariola Rdz is a 43 y.o. female c/o throwing up blood again.  6/15/19 woke up from her sleep having to cough and coughed so hard that she threw up and there was about a handful of blood mixed with mucous and food from the night before.  Has had this 2x this year/spring and never before.  Previous went to ED and was hospitalized for a couple of days.  Normal EGD and colonoscopy.  Scheduled for capsule study today but had to reschedule b/c she didn't have the gatarade and miralax.  Needs rx for miralax if possible.  Otherwise feeling well but pt is very scared about what this is.  Stopped the multivitamin with iron prior to her capsule study.      Notes she has a bad hard cough almost daily and sometimes vomits but usually just dry heave.  Not a smoker.  Not feeling like she is sick.  No fevers/chills.  Stable weight- maybe gaining, but not loosing.  No sick contacts.  No TB  exposure known.  After a coughing fit feels short of breath and takes a couple minutes to feel like she can catch her breath.  No h/o asthma.  No tobacco use or exposure.  Usually rides a scooter but does get winded easily with walking.      Called clinic and set up apt for today.  Has Chest CT scheduled for next week to make sure her bleeding isn't coming from her lungs.      ROS:  Constitutional, CV, Resp, GI, , MSK, skin, neuro, psych all negative except as outlined in the HPI above and patient denies any other symptoms.      Lab tests reviewed-1: hgb 6/7/19 reviewed 10.8    Physical Exam:  /72 (Patient Site: Left Arm, Patient Position: Sitting, Cuff Size: Adult Regular) Comment (Patient Site): forearm  Pulse 88   Resp 28   Wt (!) 328 lb (148.8 kg)   LMP 05/27/2019 (Approximate)   BMI 59.99 kg/m   Body mass index is 59.99 kg/m . Patient's last menstrual period was 05/27/2019 (approximate).  Vital signs reviewed  Wt Readings from Last 3 Encounters:   06/20/19 (!) 328 lb (148.8 kg)   06/07/19 (!) 326 lb (147.9 kg)   05/28/19 (!) 329 lb 3.2 oz (149.3 kg)     Social History     Tobacco Use   Smoking Status Never Smoker   Smokeless Tobacco Never Used   Tobacco Comment    family members smoke outside      Social History     Substance and Sexual Activity   Sexual Activity Yes     Partners: Male     Birth control/protection: Condom     No data recorded  PHQ-9 Total Score: 12 (6/7/2019  4:22 PM)    PHQ-2 Total Score: 2 (6/7/2019  4:22 PM)    No data recorded    All normal as below except abnormalities include: pt appears well at her baseline. Using a seated walker today b/c her motorized scooter ran out of power and is at home charging.  Benign exam today.    General is a  43 y.o. female sitting comfortably in no apparent distress.   HEENT:  nasal, oral exams within normal   Neck: Supple without lymphadenopathy or thyromegally  CV: Regular rate and rhythm S1S2 without rubs, murmurs or gallops,   Lungs: Clear  "to auscultation bilaterally  Abd:  +BS, soft NT/ND,  No masses or organomegally  Extremities: Warm, No Edema, 2+ Pedal and radial pulses bilaterally  Skin: No lesions or rashes noted  Neuro/MSK: Able to ambulate around the exam room with equal movement, strength and normal coordination of the upper and lower extremeties symmetrically    Results for orders placed or performed in visit on 06/20/19   HM2(CBC w/o Differential)   Result Value Ref Range    WBC 11.3 (H) 4.0 - 11.0 thou/uL    RBC 4.76 3.80 - 5.40 mill/uL    Hemoglobin 10.3 (L) 12.0 - 16.0 g/dL    Hematocrit 32.3 (L) 35.0 - 47.0 %    MCV 68 (L) 80 - 100 fL    MCH 21.6 (L) 27.0 - 34.0 pg    MCHC 31.7 (L) 32.0 - 36.0 g/dL    RDW 17.2 (H) 11.0 - 14.5 %    Platelets 578 (H) 140 - 440 thou/uL    MPV 8.5 7.0 - 10.0 fL       Med list and active problem list reviewed and updated as part of this encounter    Current Outpatient Medications on File Prior to Visit   Medication Sig Dispense Refill     BD ULTRA-FINE MILLI PEN NEEDLE 32 gauge x 5/32\" Ndle USE THREE TIMES DAILY 300 each 0     BLOOD SUGAR DIAGNOSTIC (TRUETEST TEST STRIPS MISC) Use As Directed. Test 3 times daily       buPROPion (WELLBUTRIN XL) 150 MG 24 hr tablet TAKE 1 TABLET(150 MG) BY MOUTH DAILY 90 tablet 3     cetirizine (ZYRTEC) 10 MG tablet Take 10 mg by mouth 2 (two) times a day.       RONAN/IRON chewable tablet CSW 1 T PO D  7     CONTOUR NEXT TEST STRIPS strips TEST TID  3     ergocalciferol (ERGOCALCIFEROL) 50,000 unit capsule Take 1 capsule (50,000 Units total) by mouth 2 (two) times a week. 8 capsule 11     fluocinonide (LIDEX) 0.05 % cream ALBAN  BID      PRF  ITCHY  SPOTS    FOR  LESS  THAN  2  WEEKS  3     GAVILYTE-G 236-22.74-6.74 -5.86 gram solution        glipiZIDE (GLUCOTROL XL) 10 MG 24 hr tablet Take 1 tablet by mouth daily before breakfast  3     hydrOXYzine HCl (ATARAX) 50 MG tablet 1-2 every 6 hours as needed for itching 120 tablet 11     insulin glargine (BASAGLAR KWIKPEN U-100 " "INSULIN) 100 unit/mL (3 mL) pen Inject 50 Units under the skin at bedtime.              lancets (TRUEPLUS LANCETS) 33 gauge Misc Use As Directed. Check BG 3 tmes daily       metFORMIN (GLUCOPHAGE) 1000 MG tablet TAKE 1 TABLET(1000 MG) BY MOUTH TWICE DAILY WITH MEALS 180 tablet 3     metoclopramide (REGLAN) 5 MG tablet Take 1 tablet (5 mg total) by mouth daily as needed for nausea. 30 tablet 1     miscellaneous medical supply Misc Compression Stockings- custom knee high with zipper 4 each 0     montelukast (SINGULAIR) 10 mg tablet TAKE 1 TABLET(10 MG) BY MOUTH DAILY AT BEDTIME AS DIRECTED 90 tablet 4     NOVOFINE PLUS 32 gauge x 1/6\" Ndle Inject 1 each under the skin daily. With basaglar insulin       omeprazole (PRILOSEC) 40 MG capsule TAKE 1 CAPSULE(40 MG) BY MOUTH DAILY BEFORE BREAKFAST 90 capsule 3     pediatric multivit-iron-min Chew Chew 1 tablet daily. 100 each 4     pioglitazone (ACTOS) 30 MG tablet Take 1 tablet by mouth daily  0     sertraline (ZOLOFT) 100 MG tablet TAKE 2 TABLETS BY MOUTH DAILY 180 tablet 2     traMADol (ULTRAM) 50 mg tablet Take 1 tablet (50 mg total) by mouth daily as needed for pain. 15 tablet 0     TRULICITY 1.5 mg/0.5 mL PnIj INJECT 1.5 MG SUBCUTANEOUS ONCE A WEEK  1     No current facility-administered medications on file prior to visit.          Giselle Munoz MD    "

## 2021-05-29 NOTE — TELEPHONE ENCOUNTER
Refill Approved    Rx renewed per Medication Renewal Policy. Medication was last renewed on 4/26/18.    Brianna Arana, [facility] Triage/Med Refill 5/23/2019     Requested Prescriptions   Pending Prescriptions Disp Refills     omeprazole (PRILOSEC) 40 MG capsule [Pharmacy Med Name: OMEPRAZOLE 40MG CAPSULES] 90 capsule 0     Sig: TAKE 1 CAPSULE(40 MG) BY MOUTH DAILY BEFORE BREAKFAST       GI Medications Refill Protocol Passed - 5/22/2019 10:21 AM        Passed - PCP or prescribing provider visit in last 12 or next 3 months.     Last office visit with prescriber/PCP: 4/15/2019 Giselle Munoz MD OR same dept: 4/15/2019 Giselle Munoz MD OR same specialty: 4/15/2019 Giselle Munoz MD  Last physical: 11/30/2015 Last MTM visit: Visit date not found   Next visit within 3 mo: Visit date not found  Next physical within 3 mo: Visit date not found  Prescriber OR PCP: Giselle Munoz MD  Last diagnosis associated with med order: There are no diagnoses linked to this encounter.  If protocol passes may refill for 12 months if within 3 months of last provider visit (or a total of 15 months).

## 2021-05-29 NOTE — PROGRESS NOTES
Called and spoke with the [place] [facility] Dr. Reeder's office. They stated 9 am is the latest that thy can schedule for patient. Patient does have an appointment 6/13/2019 to come and talk with Dr. Reeder about the pillcam study. They aware of patient's concern and will talk with her when she comes in for her appointments.

## 2021-05-29 NOTE — TELEPHONE ENCOUNTER
Called and spoke with Patient , Message was given, Pt understood, no further questions.  Appointment made to come in 6/20/2019 with Dr. Munoz.

## 2021-05-29 NOTE — PROGRESS NOTES
TCM DISCHARGE FOLLOW UP CALL    Discharge Date:  5/30/2019  Reason for hospital stay (discharge diagnosis)::  Hematemesis, Type 2 diabetes mellitus with diabetic neuropathy, with long-term current use of insulin, Chronic Reflux Esophagitis, Anemia, Anxiety and depression  Are you feeling better, the same or worse since your discharge?:  Patient is feeling better (Feeling a little bit better.  Denies hemoptysis.)  Do you feel like you have a plan in the event of a health emergency?: Yes (Has Life line/Health Alert button)    As part of your discharge plan, were  home care services ordered for you?: No (Has PCA services)    Did you receive any new medications, or was there a change to your medications?: No    Do you have any follow up visits scheduled with your PCP or Specialist?:  No  I'm glad to hear you're doing well and we want you to continue to do well. Your PCP would like to see you for a follow-up visit. Can we help set that up for your today?: Yes    (RN) Patient was assisted in making appointment and/or given number to [facility].  If there are immediate concerns, In Basket messge route to the PCP with a summary of concern.:  In Basket message routed to PCP  RN NOTES::  Reminded pt to call GI to schedule PillCam & f/u appt      Ines Ratliff RN Care Manager, Population Health

## 2021-05-29 NOTE — TELEPHONE ENCOUNTER
RN spoke w/pt for hospital discharge follow up call, and unable to schedule INF appt as no appts available.  Pt requesting INF w/PCP only.  Please call patient to schedule f/u appt, per Dr. Munoz's response below.  Thank you.    Ines Ratliff RN Care Manager, [facility]

## 2021-05-29 NOTE — TELEPHONE ENCOUNTER
Please let pt know that her hgb is about the same at 10.3    She should restart her vitamin after she does her capsule study.      Her chest xray was normal.  We will see what her CT scan shows next week.

## 2021-05-29 NOTE — TELEPHONE ENCOUNTER
Pt has had this many times in past several months and workup has been healthy so far.  I would recommend that she get a Chest CT next since the GI workup has been normal.  May be more coughing up blood then vomiting blood since every time starts with coughing fit that triggers vomiting blood.     Would like her to get a Chest CT and also some labs as an outpt- okay to get on my schedule Thursday or Friday this week if she can get the labs and CT in next 1-2 days.  Okay to db if needed

## 2021-05-29 NOTE — TELEPHONE ENCOUNTER
CALLED PT NO ANSWER LEFT MESSAGE TO CALL CENTRAL SCHEDULING [phone] TO GET ct SCHEDULED PRIOR TO HER APPT ON 06/20

## 2021-05-30 VITALS — BODY MASS INDEX: 44.02 KG/M2 | WEIGHT: 233 LBS

## 2021-05-30 VITALS — BODY MASS INDEX: 46.48 KG/M2 | WEIGHT: 246 LBS

## 2021-05-30 RX ORDER — PRAMIPEXOLE DIHYDROCHLORIDE 0.5 MG/1
TABLET ORAL
Qty: 180 TABLET | Refills: 3 | Status: SHIPPED | OUTPATIENT
Start: 2021-05-30 | End: 2022-06-13

## 2021-05-30 NOTE — TELEPHONE ENCOUNTER
Please let pt know that the chest CT she had this week is healthy and normal.      I do not see anything wrong with her lungs to cause bleeding or blood in her vomit or to make her cough.     I would like her to get the capsule study done and if that is normal then we can have her see a throat doctor next.     I put a referral in for a throat doctor if she wants to get this scheduled.

## 2021-05-30 NOTE — TELEPHONE ENCOUNTER
Please let pt know that I'm going to make her ibuprofen a little weaker- 600mg and only want her to take this once a day with food.    This may be causing her to throw up blood.    May be able to go back up once we know for sure why she is throwing up blood but need to wait until all the tests are back.

## 2021-05-30 NOTE — PROGRESS NOTES
"HISTORY OF PRESENT ILLNESS  Asked to see by Dr. Munoz for evaluation of hematemesis. Patient reports that she has thrown up blood 7 times in 4 months. She reports that she had colonoscopy and CT scan of her check and stomach. She was hospitalized twice for it. She reports that she has had pain with vomiting. No sore throat. No problems swallowing. Food goes down OK. She feels that, \"the food doesn't want to stay down all of the time.\" No throat or tonsil infections. No fever, sweats or chills.     REVIEW OF SYSTEMS  Review of Systems: a 10-system review was performed. Pertinent positives are noted in the HPI and on a separate scanned document in the chart.    PMH, PSH, FH and SH has documented in the EHR.      EXAM    CONSTITUTIONAL  General Appearance:  Normal, well developed, well nourished, no obvious distress  Ability to Communicate:  communicates appropriately.    HEAD AND FACE  Appearance and Symmetry:  Normal, no scalp or facial scarring or suspicious lesions.  Paranasal sinuses tenderness:  Normal, Paranasal sinuses non tender    EARS  Clinical speech reception threshold:  Normal, able to hear normal speech.  Auricle:  Normal, Auricles without scars, lesions, masses.  External auditory canal:  Normal, External auditory canal normal.  Tympanic membrane:  Normal, Tympanic membranes normal without swelling or erythema.  Tympanic membrane mobility:  Normal, Normal tympanic membrane mobility.    NOSE (speculum or scope)  Architecture:  Normal, Grossly normal external nasal architecture with no masses or lesions.  Mucosa:  Normal mucosa, No polyps or masses.  Septum:  Normal, Septum non-obstructing.  Turbinates:  Normal, No turbinate abnormalities    ORAL CAVITY AND OROPHARYNX  Lips:  Normal.  Dental and gingiva:  Normal, No obvious dental or gingival disease.  Mucosa:  Normal, Moist mucous membranes.  Tongue:  Normal, Tongue mobile with no mucosal abnormalities  Hard and soft palate:  Normal, Hard and soft " palate without cleft or mucosal lesions.  Oral pharynx:  Normal, Posterior pharynx without lesions or remarkable asymmetry.  Saliva:  Normal, Clear saliva.  Masses:  Normal, No palpable masses or pathologically enlarged lymph nodes.    LARYNGOSCOPY  Risks and benefit of Flexible laryngeal endoscopy were discussed with the patient and they wished to proceed.  The nose was sprayed with 4% lidocaine / 1% phenylephrine.  After allowing adequate time for anesthesia the procedure was started.  Patient tolerated the procedure well.  See exam note for findings.    LARYNX AND HYPOPHARYNX (mirror or scope)  Voice Quality:  Normal voice quality.  Supra glottis:  Normal, No edema or erythema.  Epiglottis:  Normal, No epiglottic mass or mucosal lesions.  Pyriform sinuses:  Normal, Pyriform sinuses clear.  Vocal cords appearance:  Normal, Vocal cord motion symmetric.  Vocal cord motion:  Normal, Vocal cord motion symmetric  Endolarynx:  Normal, No Endolaryngeal mass or mucosal lesions.    NECK  Masses/lymph nodes:  Normal, No worrisome neck masses or lymph nodes.  Salivary glands:  Normal, Parotid and submandibular glands.  Trachea and larynx position:  Normal, Trachea and larynx midline.  Thyroid:  Normal, No thyroid abnormality.  Tenderness:  Normal, No cervical tenderness.  Suppleness:  Normal, Neck supple    NEUROLOGICAL  Speech pattern:  Normal, Proasaic    RESPIRATORY  Symmetry and Respiratory effort:  Normal, Symmetric chest movement and expansion with no increased intercostal retractions or use of accessory muscles.     IMPRESSION  History of hematemesis. No evidence of pathology in the oropharynx, hypopharynx or larynx.     RECOMMENDATION  I provided reassurance that there was nothing in the upper aerodigestive tract that was contributing to her symptoms.    Evin Lozano

## 2021-05-30 NOTE — TELEPHONE ENCOUNTER
RN cannot approve Refill Request    RN can NOT refill this medication med is not covered by policy/route to provider.    Alex Mendez, [facility] Triage/Med Refill 6/27/2019    Requested Prescriptions   Pending Prescriptions Disp Refills     ibuprofen (ADVIL,MOTRIN) 800 MG tablet [Pharmacy Med Name: IBUPROFEN 800MG TABLETS] 90 tablet 0     Sig: TAKE 1 TABLET BY MOUTH THREE TIMES DAILY WITH FOOD AS NEEDED       There is no refill protocol information for this order

## 2021-05-30 NOTE — TELEPHONE ENCOUNTER
Who is calling:  Patient  Reason for Call:  Patient stated she just saw Dr. Lozano at [facility]'s ENT. Patient stated Dr. Lozano couldn't find the reasons for why patient was having her throat symptoms. Patient wants to know what she should do next.  Date of last appointment with primary care: 6/20/19  Okay to leave a detailed message: Yes  [phone]

## 2021-05-30 NOTE — TELEPHONE ENCOUNTER
RN cannot approve Refill Request    RN can NOT refill this medication med is not covered by policy/route to provider. Last office visit: 6/20/2019 Giselle Munoz MD Last Physical: 11/30/2015 Last MTM visit: Visit date not found Last visit same specialty: 6/20/2019 Giselle Munoz MD.  Next visit within 3 mo: Visit date not found  Next physical within 3 mo: Visit date not found      Juliette Lee, [facility] Triage/Med Refill 6/27/2019    Requested Prescriptions   Pending Prescriptions Disp Refills     ibuprofen (ADVIL,MOTRIN) 600 MG tablet [Pharmacy Med Name: IBUPROFEN 600MG TABLETS] 90 tablet 0     Sig: TAKE 1 TABLET BY MOUTH EVERY DAY AS NEEDED FOR PAIN. TAKE WITH FOOD       There is no refill protocol information for this order

## 2021-05-31 VITALS — WEIGHT: 262 LBS | BODY MASS INDEX: 49.5 KG/M2

## 2021-05-31 VITALS — WEIGHT: 264 LBS | BODY MASS INDEX: 49.88 KG/M2

## 2021-05-31 VITALS — BODY MASS INDEX: 49.89 KG/M2 | WEIGHT: 264.04 LBS

## 2021-05-31 NOTE — TELEPHONE ENCOUNTER
Called [facility] they informed me that she did have surgery scheduled with Dr. Gonzalez but it cancelled back in May and patient has never seen this provider otherwise.  Called and left message regarding this with Janet's PCA Charlee

## 2021-05-31 NOTE — TELEPHONE ENCOUNTER
Looks like GI has her scheduled for a esophagogastroduodenoscopy on 8/28/19 at [facility].  This would be the next step.      She is okay overall.  I realize this is stressful and scary for her.  She should go to ED if she is vomiting blood more than 3-4 times in a day or if she is vomiting more than a cup at a time.      I'm going to send some cough syrup to her pharmacy to take at night- maybe if her cough is better she will be less likely to vomit blood.

## 2021-05-31 NOTE — TELEPHONE ENCOUNTER
I called pt. No answer lmcb  I spoke with Josr @[facility] she will contact Charlee her .[phone] to schedule the pill cam study

## 2021-05-31 NOTE — TELEPHONE ENCOUNTER
Who is calling:  Patient and her PCA  Reason for Call:  They are calling again in regards to the below notes. It appears there is still a lot of confusion about if the patient should be getting seen by [facility]. The caller kept states Dr Munoz office initiated the call on 7/30/2019 and 8/01/2019, but the documentation was started because of an incoming call to the clinic on both occasions. The caller states they had no further questions at this time.    Writer is unable to determine if there is anything further needed at this time.  Date of last appointment with primary care: 6/20/2019  Okay to leave a detailed message: Yes

## 2021-05-31 NOTE — TELEPHONE ENCOUNTER
Triage call:   Threw up 9 times in 4 months- reports that the last time she threw up blood was on Monday- one table spoon. No abdominal pain, blood was a darker maroon. Stools have been normal color and normal consistency for her. Hard cough as well associated with her symptoms.     Has seen PCP for this as well and ENT - she is wondering what her next step is. Patient is very frustrated with trying to figure out what is going on.     PCP please advise- Would you like to schedule an appointment with patient? Please advise.     Caroline Santiago RN [facility] Triage/Med Refill 8/1/2019 10:13 AM    Reason for Disposition    Vomiting red blood or black (coffee ground) material     Last emesis was Monday- deferring to PCP to determine next step    Protocols used: VOMITING-A-OH

## 2021-05-31 NOTE — TELEPHONE ENCOUNTER
I can see something in the chart at [facility] with Dr Gonzalez- [facility]    Can you please call [facility] and see what they are recommending next for this pt.     I think this is through their clinic.

## 2021-05-31 NOTE — TELEPHONE ENCOUNTER
Who is calling:  Patient is calling with the assistance of her Charlee CASTELAN  Reason for Call:  Caller stated the patient is very confused about an Endoscopy procedure she was told she is scheduled for on the 8/28/2019.  Caller stated the patient called [facility] and was informed that they, being [facility], did not have any file for this patient or procedure.    Caller stated [facility] also told the patient she would need to see her PCP for some kind of a prep prior to a procedure like that, if that was the case.  [facility] recommended the patient contact [facility] to find out more information.    Caller stated the patient does not recall talking about any of this with her doctor.  Date of last appointment with primary care: 6/20/2019  Okay to leave a detailed message: No  Patient's number:[phone]  PCA's phone number:  [phone]    Caller stated the patient is requesting the return call be made to Charlee CASTELAN, because she is not understanding what is going on.

## 2021-05-31 NOTE — TELEPHONE ENCOUNTER
Patient Returning Call  Reason for call:  Patient returned call   Information relayed to patient:  Patient was informed of below and asked are they going to schedule a pillcam? Patient was encouraged to discuss this with the GI and patient discontinued call.  Patient has additional questions:  No  If YES, what are your questions/concerns:  none  Okay to leave a detailed message?: Yes

## 2021-06-01 ENCOUNTER — RECORDS - HEALTHEAST (OUTPATIENT)
Dept: ADMINISTRATIVE | Facility: CLINIC | Age: 46
End: 2021-06-01

## 2021-06-01 VITALS — BODY MASS INDEX: 58.39 KG/M2 | WEIGHT: 293 LBS

## 2021-06-01 VITALS — WEIGHT: 290 LBS | HEIGHT: 61 IN | BODY MASS INDEX: 54.75 KG/M2

## 2021-06-01 NOTE — TELEPHONE ENCOUNTER
RN cannot approve Refill Request    RN can NOT refill this medication med is not covered by policy/route to provider       . Last office visit: 6/20/2019 Giselle Munoz MD Last Physical: 11/30/2015 Last MTM visit: Visit date not found Last visit same specialty: 6/20/2019 Giselle Munoz MD.  Next visit within 3 mo: Visit date not found  Next physical within 3 mo: Visit date not found      Brianna Arana, [facility] Triage/Med Refill 9/27/2019    Requested Prescriptions   Pending Prescriptions Disp Refills     ibuprofen (ADVIL,MOTRIN) 600 MG tablet [Pharmacy Med Name: IBUPROFEN 600MG TABLETS] 90 tablet 0     Sig: TAKE 1 TABLET BY MOUTH EVERY DAY AS NEEDED FOR PAIN. TAKE WITH FOOD       There is no refill protocol information for this order

## 2021-06-02 ENCOUNTER — RECORDS - HEALTHEAST (OUTPATIENT)
Dept: ADMINISTRATIVE | Facility: CLINIC | Age: 46
End: 2021-06-02

## 2021-06-02 VITALS — WEIGHT: 293 LBS | HEIGHT: 61 IN | BODY MASS INDEX: 55.32 KG/M2

## 2021-06-02 VITALS — WEIGHT: 293 LBS | BODY MASS INDEX: 63.11 KG/M2

## 2021-06-02 VITALS — BODY MASS INDEX: 55.32 KG/M2 | HEIGHT: 61 IN | WEIGHT: 293 LBS

## 2021-06-02 VITALS — WEIGHT: 293 LBS | BODY MASS INDEX: 61.08 KG/M2

## 2021-06-02 NOTE — PROGRESS NOTES
Assessment and Plan:       1. Microcytic anemia  Multifactorial related to blood loss from either hemoptysis or hematemsis along with low iron diet and ongoing periods.  Stable.  Continue iron daily.    - HM2(CBC w/o Differential)    2. Routine general medical examination at a health care facility  Annual medicare wellness exam as below.      3. Class 3 severe obesity bmi 50-59 (H)  Limiting mobility and ability to get knee replacement and contributing to diabetes and OA severity    4. Type 2 diabetes mellitus with diabetic neuropathy, with long-term current use of insulin (H)  un Controlled.  Addressed smoking status and aspirin therapy.  Recommended annual eye exam and dental cares. Reviewed foot cares and foot exam.  Blood pressure and lipid management reviewed today.  Vaccines reviewed and updated.  Plan for glucose management includes ongoing focus on healthy diabetic diet and increased activity, managed by [facility]-their plan was to have her review her bottles and call their clinic if there are any differences between bottles and her med list and to take her meds as prescribed- no changes to meds were made 9/2019. Continue trulicity 1.5mg weekly, actos 30mg daily, metformin 1000mg two times a day, glargine 50u hs,  Restart glipizide xl 10mg daily,  And f/u with endocrine in 4 months as scheduled  Will help pt schedule mtm and consider home RN for med set up.  Labs ordered as below including:     Lab Results   Component Value Date    HGBA1C 9.1 (H) 09/12/2019   , No results found for: LDL,   Lab Results   Component Value Date    CREATININE 0.82 10/10/2019       - Comprehensive Metabolic Panel  - LDL Cholesterol, Direct  - Vitamin B12    5. Need for influenza vaccination  - Influenza, Seasonal Quad, PF =/> 6months    6. Vitamin D deficiency  Pt notes she only wants to take her vitamin D once a week- likely taking less than this as she does not like to take medications.  - ergocalciferol  (ERGOCALCIFEROL) 50,000 unit capsule; Take 1 capsule (50,000 Units total) by mouth once a week.  Dispense: 12 capsule; Refill: 4  - Vitamin D, Total (25-Hydroxy)    7. Osteoarthrosis Of The Knee  Pt upset that she can't get a knee replacement and seeking 2nd opinion.  Reviewed importance of weight loss both for improved safety of surgery and the longevity of an artificial knee so that it doesn't fail.  Pt notes she can't loose weight due to limited mobility.  Discussed surgical options and weight clinic support but she declines.  Limited cognitive abilities and financial support limit healthy lifestyle.   - ibuprofen (ADVIL,MOTRIN) 800 MG tablet; Take 1 tablet (800 mg total) by mouth daily as needed for pain.  Dispense: 30 tablet; Refill: 3    8. Hypomagnesemia  Pt not open to taking an extra supplement.  Reviewed muscle spasm and mg.    - Magnesium    9. Hematemesis without nausea  Pt has ongoing coughing fits triggering vomiting with bloody mucous.  GI evaluation so far has been WNL.  Recommended to get capsule study but pt unable/unwilling to do because of timing of this early in the morning and her medications and low sugars.  Normal Chest CT.  Pulmonary consult appreciated to evaluate for possible hemoptysis due to frequent cough triggering everything. ENT evaluation wnl.    - Ambulatory referral to Pulmonology    10. Cervical cancer screening  Attempted pap smear but pt very uncomfortable.  Able to visualize cervix but not the os due to vaginal tissue.  Blind sweep of general area of os for screening today.  No previous abnormal pap smears   - Gynecologic Cytology (PAP Smear)  - HPV High Risk DNA Cervical    11. Breast cancer screening, high risk patient    - Mammo Diagnostic Bilateral; Future    12. Missed period  Check for pregnancy.  Likely related to weight and anovulation  - Beta-hCG, Quantitative  - Thyroid [place]    13. Encounter for observation for other suspected diseases and conditions ruled out   -  Mammo Diagnostic Bilateral; Future    14. Encounter for procreative management and counseling for gestational carrier   - Beta-hCG, Quantitative        The patient's current medical problems were reviewed.    The following high BMI interventions were performed this visit: encouragement to exercise and lifestyle education regarding diet  The following health maintenance schedule was reviewed with the patient and provided in printed form in the after visit summary:   Health Maintenance   Topic Date Due     ADVANCE CARE PLANNING  06/04/2018     PAP SMEAR  06/19/2019     HPV TEST  06/19/2019     DIABETES OPHTHALMOLOGY EXAM  08/21/2019     DEPRESSION FOLLOW UP  09/21/2019     DIABETES FOLLOW-UP  10/15/2019     MAMMOGRAM  11/14/2019     DIABETES HEMOGLOBIN A1C  03/12/2020     DIABETES FOOT EXAM  03/21/2020     DIABETES URINE MICROALBUMIN  03/21/2020     MEDICARE ANNUAL WELLNESS VISIT  10/10/2020     TD 18+ HE  11/21/2026     HIV SCREENING  Completed     PNEUMOCOCCAL IMMUNIZATION 19-64 MEDIUM RISK  Completed     INFLUENZA VACCINE RULE BASED  Completed     TDAP ADULT ONE TIME DOSE  Completed        Subjective:   Chief Complaint: Mariola Rdz is an 44 y.o. female here for an Annual Wellness visit.   HPI:  Knee pain- told she is too young and weighs too much to get knee repalcement. Has 2nd opinion scheduled.  600mg ibuprofen not helping needs 800mg.      Did get 2nd opinion about the blood in her vomit by FM Dr owens with [facility] yesterday and told that she should see a lung doctor next- scheduled with [facility] this fall.      Diabetes- fasting sugar 138 today.  Eats 5-6 pills in the morning (depending on vitamin D).  50u insulin at bedtime and trulicity    Review of Systems:  Please see above.  The rest of the review of systems are negative for all systems.    Patient Care Team:  [facility] MD as PCP - General (Family Medicine)  Charlee Magaña as Patient Care Assistant  [employer] [facility] [facility]  (5skills  Services)  [employer]  [facility] MD as Assigned PCP     Patient Active Problem List   Diagnosis     Osteoarthrosis Of The Knee     Restless Legs Syndrome     Hay Fever     Mild intellectual disability     Prurigo Nodularis     Essential Hypercholesterolemia     Anemia, iron deficiency     Neuropathy, peripheral     Vitamin D Deficiency     Mild Recurrent Major Depression     Type 2 diabetes mellitus with diabetic neuropathy, with long-term current use of insulin (H)     Chronic Reflux Esophagitis     Chronic midline low back pain without sciatica     Venous stasis     Diabetic neuropathy (H)     Morbid obesity with BMI of 45.0-49.9, adult (H)     Plantar fasciitis of right foot     Hypomagnesemia     Skin picking habit     Closed avulsion fracture of distal fibula with delayed healing, left     Itching     Microcytic anemia     Hematemesis     Lymphedema of both lower extremities     Learning disability     Anxiety and depression     Gastroesophageal reflux disease     Nephritis and nephropathy, not specified as acute or chronic, with other specified pathological lesion in kidney, in diseases classified elsewhere     Past Medical History:   Diagnosis Date     Chronic reflux esophagitis      Costochondritis      Diabetes mellitus type 2, uncontrolled, with complications (H)      Diabetic neuropathy (H)      DKA (diabetic ketoacidoses) (H) 2015     Helicobacter pylori gastritis      Hypercholesteremia      Osteoarthritis, knee      Restless leg syndrome      S/P  section 5/28/2019    X 2; last on 2017     Urticaria       Past Surgical History:   Procedure Laterality Date      SECTION  2017     INSERT MIDLINE HE  2015          PA  DELIVERY ONLY      Description:  Section;  Proc Date: 2001;  Comments: fetal distress     PA COLONOSCOPY FLX DX W/COLLJ SPEC WHEN PFRMD N/A 2019    Procedure: COLONOSCOPY;  Surgeon: Frankie Maher MD;   Location: [facility] OR;  Service: Gastroenterology     MD ESOPHAGOGASTRODUODENOSCOPY TRANSORAL DIAGNOSTIC N/A 5/29/2019    Procedure: ESOPHAGOGASTRODUODENOSCOPY (EGD);  Surgeon: Frankie Mhaer MD;  Location: [facility] OR;  Service: Gastroenterology     MD OPEN TX TRIMALLEOLAR ANKLE FX W/O FIX PST LIP Right     Description: Open Treatment Of Trimalleolar Ankle Fracture;  Proc Date: 01/01/2009;  Comments: complicated by post-op infections     MD REMOVAL GALLBLADDER      Description: Cholecystectomy;  Recorded: 01/19/2009;     pulley release Left     index     US BREAST CORE BIOPSY LEFT Left 11/20/2018      Family History   Problem Relation Age of Onset     No Medical Problems Sister      Asthma Brother      Emphysema Brother      No Medical Problems Sister      Cancer Mother      Diabetes Mother       Social History     Socioeconomic History     Marital status: Single     Spouse name: Not on file     Number of children: Not on file     Years of education: Not on file     Highest education level: Not on file   Occupational History     Occupation: Disability for mental health   Social Needs     Financial resource strain: Not on file     Food insecurity:     Worry: Not on file     Inability: Not on file     Transportation needs:     Medical: Not on file     Non-medical: Not on file   Tobacco Use     Smoking status: Never Smoker     Smokeless tobacco: Never Used     Tobacco comment: family members smoke outside    Substance and Sexual Activity     Alcohol use: No     Drug use: No     Sexual activity: Yes     Partners: Male     Birth control/protection: Condom   Lifestyle     Physical activity:     Days per week: Not on file     Minutes per session: Not on file     Stress: Not on file   Relationships     Social connections:     Talks on phone: Not on file     Gets together: Not on file     Attends [employer] service: Not on file     Active member of club or organization: Not on file     Attends meetings  "of clubs or organizations: Not on file     Relationship status: Not on file     Intimate partner violence:     Fear of current or ex partner: Not on file     Emotionally abused: Not on file     Physically abused: Not on file     Forced sexual activity: Not on file   Other Topics Concern     Not on file   Social History Narrative    Lives with Geoff alexander, and 2 roomates    Son has been adopted out      Current Outpatient Medications   Medication Sig Dispense Refill     BD ULTRA-FINE MILLI PEN NEEDLE 32 gauge x 5/32\" Ndle USE THREE TIMES DAILY 300 each 0     blood glucose meter (GLUCOMETER) Dispense meter covered by pt ins.       BLOOD SUGAR DIAGNOSTIC (TRUETEST TEST STRIPS MISC) Use As Directed. Test 3 times daily       buPROPion (WELLBUTRIN XL) 150 MG 24 hr tablet TAKE 1 TABLET(150 MG) BY MOUTH DAILY 90 tablet 3     cetirizine (ZYRTEC) 10 MG tablet Take 10 mg by mouth 2 (two) times a day.       RONAN/IRON chewable tablet CSW 1 T PO D  7     CONTOUR NEXT TEST STRIPS strips TEST TID  3     dextromethorphan-guaifenesin (GUAIFENESIN-DM)  mg/5 mL Liqd Take 10 mL by mouth at bedtime. 355 mL 1     ergocalciferol (ERGOCALCIFEROL) 50,000 unit capsule Take 1 capsule (50,000 Units total) by mouth once a week. 12 capsule 4     fluocinonide (LIDEX) 0.05 % cream ALBAN  BID      PRF  ITCHY  SPOTS    FOR  LESS  THAN  2  WEEKS  3     GAVILYTE-G 236-22.74-6.74 -5.86 gram solution        glipiZIDE (GLUCOTROL XL) 10 MG 24 hr tablet Take 1 tablet by mouth daily before breakfast  3     hydrOXYzine HCl (ATARAX) 50 MG tablet 1-2 every 6 hours as needed for itching 120 tablet 11     ibuprofen (ADVIL,MOTRIN) 800 MG tablet Take 1 tablet (800 mg total) by mouth daily as needed for pain. 30 tablet 3     insulin glargine (BASAGLAR KWIKPEN U-100 INSULIN) 100 unit/mL (3 mL) pen Inject 50 Units under the skin at bedtime.              lancets (TRUEPLUS LANCETS) 33 gauge Misc Use As Directed. Check BG 3 tmes daily       metFORMIN (GLUCOPHAGE) " "1000 MG tablet TAKE 1 TABLET(1000 MG) BY MOUTH TWICE DAILY WITH MEALS 180 tablet 3     metoclopramide (REGLAN) 5 MG tablet Take 1 tablet (5 mg total) by mouth daily as needed for nausea. 30 tablet 1     miscellaneous medical supply Misc Compression Stockings- custom knee high with zipper 4 each 0     montelukast (SINGULAIR) 10 mg tablet TAKE 1 TABLET(10 MG) BY MOUTH DAILY AT BEDTIME AS DIRECTED 90 tablet 4     NOVOFINE PLUS 32 gauge x 1/6\" Ndle Inject 1 each under the skin daily. With basaglar insulin       omeprazole (PRILOSEC) 40 MG capsule TAKE 1 CAPSULE(40 MG) BY MOUTH DAILY BEFORE BREAKFAST 90 capsule 3     ONETOUCH VERIO FLEX Misc TEST  TID  0     pediatric multivit-iron-min Chew Chew 1 tablet daily. 100 each 4     pioglitazone (ACTOS) 30 MG tablet Take 1 tablet by mouth daily  0     polyethylene glycol (GLYCOLAX) 17 gram/dose powder Poor in 64 oz of gatoraid and drink over 4 hours the day before capsule study 116 g 0     pramipexole (MIRAPEX) 0.5 MG tablet   3     sertraline (ZOLOFT) 100 MG tablet TAKE 2 TABLETS BY MOUTH DAILY 180 tablet 2     traMADol (ULTRAM) 50 mg tablet Take 1 tablet (50 mg total) by mouth daily as needed for pain. 15 tablet 0     TRULICITY 1.5 mg/0.5 mL PnIj INJECT 1.5 MG SUBCUTANEOUS ONCE A WEEK  1     No current facility-administered medications for this visit.       Objective:   Vital Signs:   Visit Vitals  /70 (Patient Site: Right Arm, Patient Position: Sitting, Cuff Size: Adult Regular) Comment (Patient Site): forearm   Pulse (!) 104   Temp 98.1  F (36.7  C) (Oral)   Ht 5' 2\" (1.575 m) Comment: patient reported   Wt (!) 323 lb (146.5 kg)   LMP 08/30/2019 (Within Days)   BMI 59.08 kg/m         VisionScreening:  No exam data present     PHYSICAL EXAM  All normal as below except abnormalities include: pt noting tenderness left upper shoulder in soft tissue. No limitation in strenght or mobility.  No skin changes or bony abnormality.    General is a  44 y.o. female sitting " comfortably in no apparent distress.   HEENT:  TM are clear bilaterally.  Eye, nasal, oral exams within normal   Neck: Supple without lymphadenopathy or thyromegally  CV: Regular rate and rhythm S1S2 without rubs, murmurs or gallops,   Lungs: Clear to auscultation bilaterally  Abd:  +BS, soft NT/ND,  No masses or organomegally,   Extremities: Warm, No Edema, 2+ Pedal and radial pulses bilaterally  Skin: No lesions or rashes noted  Neuro: Able to ambulate around the exam room with equal movement, strength and normal coordination of the upper and lower extremeties symmetrically  Pelvic:Normally developed genitalia with no external lesions or eruptions. Vagina and cervix show no lesions, inflammation, discharge or tenderness. No cystocele, No rectocele. Uterus difficult to palpate due to body mass.  No adnexal mass or tenderness.    Breast: Normal breast exam.  No nipple changes, breast appear symmetric without nodules/lumps or skin changes. No lymphadenopathy noted.          Assessment Results 10/10/2019   Mini Cog Total Score 2   Some recent data might be hidden     A Mini-Cog score of 0-2 suggests the possibility of dementia, score of 3-5 suggests no dementia    Identified Health Risks:     The patient s PHQ-9 score is consistent with moderate depression.  She was provided with information regarding depression and was advised to schedule a follow up appointment in 6 weeks to further address this issue.  The patient was provided with appropriate referrals to address her memory problem.

## 2021-06-02 NOTE — TELEPHONE ENCOUNTER
Called and inform patient OFV will be sent to her home address. She will like for a result letter for her labs 10/10/2019 to be sent as well. Routing to provider to view and address labs. Patient was inform she needed to see the MTM for Diabetes medications, but she still wants to know why Dr. Munoz has reqeusted for her to see them. Patient was inform PCP is out today. She is okay with waiting until PCP returns for a response.

## 2021-06-02 NOTE — PATIENT INSTRUCTIONS - HE
It was good to meet you in clinic today. This is what we discussed:    1. Take gabapentin one pill twice daily for 2 weeks, then 2 pills twice daily for 2 weeks, then 3 pills twice daily.  2. We will get a sinus CT and I will call you with the result.  3. Continue omeprazole 40 mg daily.  4. Continue montelukast 10 mg at bedtime.  5. Continue cetirizine 10 mg daily.  6. We need to think about medications that can irritate the stomach or cause nausea, such as ibuprofen and Trulicity.  7. I will see you in clinic in 3 months.  8. Call any time with questions or concerning symptoms.    Iván Bear MD  Pulmonary and Critical Care Medicine  [facility]  Office [phone]

## 2021-06-02 NOTE — TELEPHONE ENCOUNTER
Received MTM referral from PCP     Patient was not reachable after several attempts, will route to MTM Pharmacist/Provider as an FYI. Left MTM scheduling information on patients voicemail.    Thank you for the referral,    See F[facility] Pharmacy Coordinator

## 2021-06-02 NOTE — TELEPHONE ENCOUNTER
I recommend that she see an pharmacist to help her go through her meds and make sure everything is correct.  This is common for anyone who has diabetes to do at least once a year.

## 2021-06-02 NOTE — PROGRESS NOTES
Pulmonary Clinic Outpatient Consultation    Assessment and Plan:   44 year old female never smoker with a history of severe obesity, urticaria, RLS, osteoarthritis, dyslipidemia, H pylori gastritis, DM2, diabetic neuropathy, costochondritis, GERD, iron deficiency anemia, seasonal allergies, venous insufficiency, back pain, and depression/anxiety, presenting for evaluation of chronic cough and hematemesis.    Chronic cough and hematemesis: No evidence of an objective parenchymal lung disease on chest CT. Unclear association between her cough and hematemesis; she has a history of gastritis and GERD, though continues to use NSAIDs for pain, which could cause dyspepsis and nausea; dulaglutide also causes nausea in 10-20% of patients. If the emesis is truly induced by chronic cough, DDx includes upper airway cough syndrome due to chronic rhinosinusitis, refractory cough-variant reflux, cough-variant asthma (significant risk of this with her severe obesity). Unfortunately, she declines to pursue methacholine challenge testing, pulmonary function testing, and empiric inhaled therapy, all due to aversion/gagging with nebulized therapies and inhalers. She also declines nasal corticosteroid, a key component of empiric therapy for chronic rhinosinusitis, due to aversion to nasal sprays. This will limit our options considerably. Given her frequent emesis, a possible sign of gastroparesis, and easy gagging, as well as diabetic neuropathy, she may benefit from empiric gabapentin, a proven therapy for refractory idiopathic chronic cough; she is willing to try this.    Plan:  - sinus CT; I will call her with the result  - gabapentin 300 mg two times a day for 2 weeks, then 600 mg two times a day for 2 weeks, then 900 mg two times a day  - continue omeprazole 40 mg daily  - continue montelukast 10 mg at bedtime  - continue cetirizine 10 mg daily  - consider dulaglutide as potential contributors to nausea/emesis  - follow up in 3  months  - encouraged her to call any time with questions or concerning symptoms    I appreciate the opportunity to participate in the care of Ms. Flynn. Please feel free to contact me at any time.    CCx: chronic cough, hematemesis    HPI: 44 year old female never smoker with a history of severe obesity, urticaria, RLS, osteoarthritis, dyslipidemia, H pylori gastritis, DM2, diabetic neuropathy, costochondritis, GERD, iron deficiency anemia, seasonal allergies, venous insufficiency, back pain, and depression/anxiety, presenting for evaluation of chronic cough and hematemesis. Cough started 7 months ago, more at night but also during the day. Nonproductive. Triggers emesis with food and blood in vomit. No wheezing. Occasional allergies. On dulaglutide for 1-2 years. Chest CT with tiny benign nodules, no pulmonary parenchymal disease. Lives with people who smoke; they currently do not smoke inside the home. No FHx of lung disease. She does not want to pursue methacholine challenge, PFTs, and does not want inhaler, nebulizer, or nasal spray, all due to aversion/gagging with nebs/inhalers/sprays. Has tingling of hands and feet, easy vomiting; I do not see gastric emptying study but she is on metoclopramide. She has a history of H pylori gastritis and GERD but is on ibuprofen for pain. In May 2019 was hospitalized and EGD did not identify any abnormalities.    ROS:  A 12-system review was obtained and was negative with the exception of the symptoms endorsed in the history of present illness.    PMH:  BMI 59.1  Urticaria  RLS  Knee OA  DL  H pylori gastritis  DM2  DKA 2015  Diabetic neuropathy  Costochondritis  GERD  ALDEN  Seasonal allergies  Venous stasis  Plantar fasciitis  Low back pain  Vitamin D deficiency  MDD/JOSE MARIA    PSH:  Past Surgical History:   Procedure Laterality Date      SECTION  2017     INSERT MIDLINE HE  2015          [place]  DELIVERY ONLY      Description:  Section;   Proc Date: 01/01/2001;  Comments: fetal distress     NC COLONOSCOPY FLX DX W/COLLJ SPEC WHEN PFRMD N/A 5/29/2019    Procedure: COLONOSCOPY;  Surgeon: Frankie Maher MD;  Location: [facility];  Service: Gastroenterology     NC ESOPHAGOGASTRODUODENOSCOPY TRANSORAL DIAGNOSTIC N/A 5/29/2019    Procedure: ESOPHAGOGASTRODUODENOSCOPY (EGD);  Surgeon: Frankie Maher MD;  Location: [facility];  Service: Gastroenterology     NC OPEN TX TRIMALLEOLAR ANKLE FX W/O FIX PST LIP Right     Description: Open Treatment Of Trimalleolar Ankle Fracture;  Proc Date: 01/01/2009;  Comments: complicated by post-op infections     NC REMOVAL GALLBLADDER      Description: Cholecystectomy;  Recorded: 01/19/2009;     pulley release Left     index     US BREAST CORE BIOPSY LEFT Left 11/20/2018       Allergies:  Allergies   Allergen Reactions     Vicks Vaporub [Camphor-Eucalyptus Oil-Menthol] Hives     Amoxicillin Nausea And Vomiting     Codeine Nausea Only and Hives     Cranberry Diarrhea     Menthol      Penicillins Nausea And Vomiting     Permethrin Itching     Lovejoy Rash       Family HX:  Family History   Problem Relation Age of Onset     No Medical Problems Sister      Asthma Brother      Emphysema Brother      No Medical Problems Sister      Cancer Mother      Diabetes Mother        Social Hx:  Social History     Socioeconomic History     Marital status: Single     Spouse name: Not on file     Number of children: Not on file     Years of education: Not on file     Highest education level: Not on file   Occupational History     Occupation: Disability for mental health   Social Needs     Financial resource strain: Not on file     Food insecurity:     Worry: Not on file     Inability: Not on file     Transportation needs:     Medical: Not on file     Non-medical: Not on file   Tobacco Use     Smoking status: Never Smoker     Smokeless tobacco: Never Used     Tobacco comment: family members smoke outside   "  Substance and Sexual Activity     Alcohol use: No     Drug use: No     Sexual activity: Yes     Partners: Male     Birth control/protection: Condom   Lifestyle     Physical activity:     Days per week: Not on file     Minutes per session: Not on file     Stress: Not on file   Relationships     Social connections:     Talks on phone: Not on file     Gets together: Not on file     Attends Mandaen service: Not on file     Active member of club or organization: Not on file     Attends meetings of clubs or organizations: Not on file     Relationship status: Not on file     Intimate partner violence:     Fear of current or ex partner: Not on file     Emotionally abused: Not on file     Physically abused: Not on file     Forced sexual activity: Not on file   Other Topics Concern     Not on file   Social History Narrative    Lives with fimiko, Geoff, and 2 roomates    Son has been adopted out       Current Meds:  Current Outpatient Medications   Medication Sig Dispense Refill     BD ULTRA-FINE MILLI PEN NEEDLE 32 gauge x 5/32\" Ndle USE THREE TIMES DAILY 300 each 0     blood glucose meter (GLUCOMETER) Dispense meter covered by pt ins.       BLOOD SUGAR DIAGNOSTIC (TRUETEST TEST STRIPS MISC) Use As Directed. Test 3 times daily       buPROPion (WELLBUTRIN XL) 150 MG 24 hr tablet TAKE 1 TABLET(150 MG) BY MOUTH DAILY 90 tablet 3     cetirizine (ZYRTEC) 10 MG tablet Take 10 mg by mouth 2 (two) times a day.       RONAN/IRON chewable tablet CSW 1 T PO D  7     CONTOUR NEXT TEST STRIPS strips TEST TID  3     ergocalciferol (ERGOCALCIFEROL) 50,000 unit capsule Take 1 capsule (50,000 Units total) by mouth once a week. 12 capsule 4     fluocinonide (LIDEX) 0.05 % cream ALBAN  BID      PRF  ITCHY  SPOTS    FOR  LESS  THAN  2  WEEKS  3     GAVILYTE-G 236-22.74-6.74 -5.86 gram solution        glipiZIDE (GLUCOTROL XL) 10 MG 24 hr tablet Take 1 tablet by mouth daily before breakfast  3     hydrOXYzine HCl (ATARAX) 50 MG tablet 1-2 every 6 " "hours as needed for itching 120 tablet 11     ibuprofen (ADVIL,MOTRIN) 800 MG tablet Take 1 tablet (800 mg total) by mouth daily as needed for pain. 30 tablet 3     insulin glargine (BASAGLAR KWIKPEN U-100 INSULIN) 100 unit/mL (3 mL) pen Inject 50 Units under the skin at bedtime.              lancets (TRUEPLUS LANCETS) 33 gauge Misc Use As Directed. Check BG 3 tmes daily       metFORMIN (GLUCOPHAGE) 1000 MG tablet TAKE 1 TABLET(1000 MG) BY MOUTH TWICE DAILY WITH MEALS 180 tablet 3     metoclopramide (REGLAN) 5 MG tablet Take 1 tablet (5 mg total) by mouth daily as needed for nausea. 30 tablet 1     miscellaneous medical supply Misc Compression Stockings- custom knee high with zipper 4 each 0     montelukast (SINGULAIR) 10 mg tablet TAKE 1 TABLET(10 MG) BY MOUTH DAILY AT BEDTIME AS DIRECTED 90 tablet 4     NOVOFINE PLUS 32 gauge x 1/6\" Ndle Inject 1 each under the skin daily. With basaglar insulin       omeprazole (PRILOSEC) 40 MG capsule TAKE 1 CAPSULE(40 MG) BY MOUTH DAILY BEFORE BREAKFAST 90 capsule 3     ONETOUCH VERIO FLEX Misc TEST  TID  0     pediatric multivit-iron-min Chew Chew 1 tablet daily. 100 each 4     pioglitazone (ACTOS) 30 MG tablet Take 1 tablet by mouth daily  0     polyethylene glycol (GLYCOLAX) 17 gram/dose powder Poor in 64 oz of gatoraid and drink over 4 hours the day before capsule study 116 g 0     pramipexole (MIRAPEX) 0.5 MG tablet   3     sertraline (ZOLOFT) 100 MG tablet TAKE 2 TABLETS BY MOUTH DAILY 180 tablet 2     traMADol (ULTRAM) 50 mg tablet Take 1 tablet (50 mg total) by mouth daily as needed for pain. 15 tablet 0     TRULICITY 1.5 mg/0.5 mL PnIj INJECT 1.5 MG SUBCUTANEOUS ONCE A WEEK  1     gabapentin (NEURONTIN) 300 MG capsule Take 1 pill twice daily for 2 weeks, then 2 pills twice daily for 2 weeks, then 3 pills twice daily. 180 capsule 11     No current facility-administered medications for this visit.        Physical Exam:  /82   Pulse (!) 110   Ht 5' 2\" (1.575 m)   " SpO2 94%   Breastfeeding? No   BMI 59.08 kg/m    Gen: alert, oriented, no distress  HEENT: nasal turbinates are moderately edematous, no oropharyngeal lesions, no cervical or supraclavicular lymphadenopathy  CV: tachy, regular, no M/G/R  Resp: CTAB, no focal crackles or wheezes  Abd: soft, nontender, no palpable organomegaly  Skin: no apparent rashes  Ext: no cyanosis, clubbing or edema  Neuro: alert, nonfocal    Labs:  reviewed    Imaging studies:  CT chest with contrast (June 2019):  - images directly reviewed, formal interpretation follows:  FINDINGS:   LUNGS AND PLEURA: Multiple 3 mm and smaller pulmonary and pleural-based nodules new from prior. No focal infiltrate or effusion.     MEDIASTINUM: No mass or adenopathy.     LIMITED UPPER ABDOMEN: Hepatic steatosis. Cholecystectomy.     MUSCULOSKELETAL: Degenerative disease. Old left third rib fracture.     IMPRESSION:   CONCLUSION:   1.  Multiple 3 mm and smaller pulmonary and pleural based nodules likely benign though technically indeterminate. If no more recent prior see guidelines below.  2.  Hepatic steatosis.    Iván Bear MD  [facility]  Cell [phone]  Office [phone]  Pager [phone]

## 2021-06-02 NOTE — TELEPHONE ENCOUNTER
Who is calling:  Janet  Reason for Call:  She wants her results and office notes from 10/10/119 mailed to her.  Patient confirmed the address we have is correct.  Date of last appointment with primary care: 10/10/19  Okay to leave a detailed message: Yes    #2 Issue:  Patient wants to know why Dr. Munoz wants her to see the pharmacist.  She is upset that she did not hear directly from the doctor herself about this.  She said the pharmacist called her and Janet hung up on her.

## 2021-06-03 ENCOUNTER — RECORDS - HEALTHEAST (OUTPATIENT)
Dept: ADMINISTRATIVE | Facility: CLINIC | Age: 46
End: 2021-06-03

## 2021-06-03 VITALS — BODY MASS INDEX: 53.92 KG/M2 | WEIGHT: 293 LBS | HEIGHT: 62 IN

## 2021-06-03 VITALS
HEART RATE: 104 BPM | HEIGHT: 62 IN | BODY MASS INDEX: 53.92 KG/M2 | WEIGHT: 293 LBS | TEMPERATURE: 98.1 F | DIASTOLIC BLOOD PRESSURE: 70 MMHG | SYSTOLIC BLOOD PRESSURE: 132 MMHG

## 2021-06-03 VITALS
DIASTOLIC BLOOD PRESSURE: 82 MMHG | BODY MASS INDEX: 59.08 KG/M2 | HEIGHT: 62 IN | HEART RATE: 110 BPM | SYSTOLIC BLOOD PRESSURE: 132 MMHG | OXYGEN SATURATION: 94 %

## 2021-06-03 VITALS — BODY MASS INDEX: 59.63 KG/M2 | WEIGHT: 293 LBS

## 2021-06-03 VITALS — WEIGHT: 293 LBS | BODY MASS INDEX: 59.99 KG/M2

## 2021-06-03 VITALS — WEIGHT: 293 LBS | BODY MASS INDEX: 59.63 KG/M2

## 2021-06-03 NOTE — TELEPHONE ENCOUNTER
"Pulmonary Telephone Note    Sinus CT shows midline deviation to the left and complete opacification of the left maxillary sinus. The interpretation calls this \"atelectatic sinus\" but clearly it is filled with fluid. Will therefore start nasal fluticasone one spray in each nostril daily and change from cetirizine to cetirizine-pseudoephedrine one pill daily. Asked her to have her blood pressure checked after starting the decongestant. Will hold off on gabapentin unless her cough is refractory to other therapy. I will see her back in 6-8 weeks. She is in agreement.    Iván Bear MD  Pulmonary and Critical Care Medicine  [facility]  Cell [phone]  Office [phone]  Pager [phone]    "

## 2021-06-03 NOTE — TELEPHONE ENCOUNTER
RN cannot approve Refill Request    RN can NOT refill this medication med is not covered by policy/route to provider. Last office visit: 6/20/2019 Giselle Munoz MD Last Physical: 10/10/2019 Last MTM visit: Visit date not found Last visit same specialty: 6/20/2019 Giselle Munoz MD.  Next visit within 3 mo: Visit date not found  Next physical within 3 mo: Visit date not found      Caroline Santiago, [facility] Triage/Med Refill 11/29/2019    Requested Prescriptions   Pending Prescriptions Disp Refills     montelukast (SINGULAIR) 10 mg tablet 90 tablet 4       There is no refill protocol information for this order

## 2021-06-03 NOTE — TELEPHONE ENCOUNTER
Patient dropped off VERIFICATION OF PRESCRIBED DIET for Dr. Munoz to fill out. Placed the original copies in the DrSushma's slot.    When forms are completed, patient would like it:    Mail-Please mail it back to home address when form has been completed.      Please re-route task back to the  to shred the copied forms and complete the task. Thanks!

## 2021-06-03 NOTE — TELEPHONE ENCOUNTER
Called patient and let her know that we only mailed the forms as that is all that was requested. Patient verbalized understanding. Closing encounter
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
## 2021-06-03 NOTE — TELEPHONE ENCOUNTER
Refill Approved    Rx renewed per Medication Renewal Policy. Medication was last renewed on 2/22/19.    Caroline Santiago, [facility] Triage/Med Refill 11/25/2019     Requested Prescriptions   Pending Prescriptions Disp Refills     sertraline (ZOLOFT) 100 MG tablet [Pharmacy Med Name: SERTRALINE 100MG TABLETS] 180 tablet 0     Sig: TAKE 2 TABLETS BY MOUTH DAILY       SSRI Refill Protocol  Passed - 11/25/2019  3:49 AM        Passed - PCP or prescribing provider visit in last year     Last office visit with prescriber/PCP: 6/20/2019 Giselle Munoz MD OR same dept: 6/20/2019 Giselle Munoz MD OR same specialty: 6/20/2019 Giselle Munoz MD  Last physical: 10/10/2019 Last MTM visit: Visit date not found   Next visit within 3 mo: Visit date not found  Next physical within 3 mo: Visit date not found  Prescriber OR PCP: Giselle Munoz MD  Last diagnosis associated with med order: 1. Mild Recurrent Major Depression  - sertraline (ZOLOFT) 100 MG tablet [Pharmacy Med Name: SERTRALINE 100MG TABLETS]; TAKE 2 TABLETS BY MOUTH DAILY  Dispense: 180 tablet; Refill: 0    If protocol passes may refill for 12 months if within 3 months of last provider visit (or a total of 15 months).

## 2021-06-03 NOTE — TELEPHONE ENCOUNTER
Who is calling:  Patient   Reason for Call:  Patient is calling questioning if the forms that were dropped off for Giselle Munoz MD to complete were also faxed to the number that was provided with the forms when they were dropped off at the clinic by her PCA?    Patient is questioning if this can be done prior to the forms being mailed back to her home?  Date of last appointment with primary care: 10/10/19  Okay to leave a detailed message: Yes  [phone]       88

## 2021-06-04 ENCOUNTER — RECORDS - HEALTHEAST (OUTPATIENT)
Dept: ADMINISTRATIVE | Facility: CLINIC | Age: 46
End: 2021-06-04

## 2021-06-04 VITALS
SYSTOLIC BLOOD PRESSURE: 142 MMHG | DIASTOLIC BLOOD PRESSURE: 64 MMHG | HEART RATE: 84 BPM | TEMPERATURE: 98.3 F | WEIGHT: 293 LBS | RESPIRATION RATE: 14 BRPM | HEIGHT: 62 IN | BODY MASS INDEX: 53.92 KG/M2

## 2021-06-04 VITALS
HEART RATE: 106 BPM | OXYGEN SATURATION: 98 % | BODY MASS INDEX: 59.08 KG/M2 | DIASTOLIC BLOOD PRESSURE: 82 MMHG | HEIGHT: 62 IN | SYSTOLIC BLOOD PRESSURE: 124 MMHG

## 2021-06-04 VITALS
DIASTOLIC BLOOD PRESSURE: 78 MMHG | SYSTOLIC BLOOD PRESSURE: 117 MMHG | HEART RATE: 106 BPM | WEIGHT: 293 LBS | BODY MASS INDEX: 65.66 KG/M2

## 2021-06-04 VITALS
HEART RATE: 85 BPM | SYSTOLIC BLOOD PRESSURE: 124 MMHG | OXYGEN SATURATION: 98 % | HEIGHT: 62 IN | DIASTOLIC BLOOD PRESSURE: 82 MMHG | BODY MASS INDEX: 59.08 KG/M2

## 2021-06-04 NOTE — TELEPHONE ENCOUNTER
RN cannot approve Refill Request    RN can NOT refill this medication med is not covered by policy/route to provider. Last office visit: 6/20/2019 Giselle Munoz MD Last Physical: 10/10/2019 Last MTM visit: Visit date not found Last visit same specialty: 6/20/2019 Giselle Munoz MD.  Next visit within 3 mo: Visit date not found  Next physical within 3 mo: Visit date not found      Emilia Ruano, [facility] Triage/Med Refill 1/1/2020    Requested Prescriptions   Pending Prescriptions Disp Refills     ibuprofen (ADVIL,MOTRIN) 600 MG tablet [Pharmacy Med Name: IBUPROFEN 600MG TABLETS] 90 tablet 0     Sig: TAKE 1 TABLET BY MOUTH EVERY DAY AS NEEDED FOR PAIN. TAKE WITH FOOD       There is no refill protocol information for this order

## 2021-06-04 NOTE — TELEPHONE ENCOUNTER
Refill Approved    Rx renewed per Medication Renewal Policy. Medication was last renewed on 10/30/18.    Brianna Arana, [facility] Triage/Med Refill 12/28/2019     Requested Prescriptions   Pending Prescriptions Disp Refills     hydrOXYzine HCl (ATARAX) 50 MG tablet [Pharmacy Med Name: HYDROXYZINE HCL 50MG TABS (WHITE)] 120 tablet 11     Sig: TAKE 1 TO 2 TABLETS BY MOUTH EVERY 6 HOURS AS NEEDED FOR ITCHING       Antihistamine Refill Protocol Passed - 12/26/2019 11:12 AM        Passed - Patient has had office visit/physical in last year     Last office visit with prescriber/PCP: 6/20/2019 Giselle Munoz MD OR same dept: 6/20/2019 Giselle Munoz MD OR same specialty: 6/20/2019 Giselle Munoz MD  Last physical: 10/10/2019 Last MTM visit: Visit date not found   Next visit within 3 mo: Visit date not found  Next physical within 3 mo: Visit date not found  Prescriber OR PCP: Giselle Munoz MD  Last diagnosis associated with med order: 1. Itching  - hydrOXYzine HCl (ATARAX) 50 MG tablet [Pharmacy Med Name: HYDROXYZINE HCL 50MG TABS (WHITE)]; TAKE 1 TO 2 TABLETS BY MOUTH EVERY 6 HOURS AS NEEDED FOR ITCHING  Dispense: 120 tablet; Refill: 11    If protocol passes may refill for 12 months if within 3 months of last provider visit (or a total of 15 months).

## 2021-06-05 VITALS
WEIGHT: 293 LBS | TEMPERATURE: 97 F | BODY MASS INDEX: 62.55 KG/M2 | HEART RATE: 101 BPM | DIASTOLIC BLOOD PRESSURE: 80 MMHG | SYSTOLIC BLOOD PRESSURE: 139 MMHG

## 2021-06-05 NOTE — TELEPHONE ENCOUNTER
Pulmonary Telephone Note    Esophagram with limited views due to patient inability to stand but overall normal. Gastric emptying study showed delayed gastric emptying at 4 hours and probable reflux. In the context of the patient's persistent emesis, it may be reasonable to have her seen by gastroenterology for diabetic gastroparesis. I am not convinced that her emesis is directly connected to her cough. Called the patient to discuss and reached voicemail. Left a brief voicemail message with the clinic phone number and a message that I would try her again at another time.    Iván Bear MD  Pulmonary and Critical Care Medicine  [facility]  Cell [phone]  Office [phone]  Pager [phone]

## 2021-06-05 NOTE — TELEPHONE ENCOUNTER
Informed patient that order has been placed.     Patient would like to come in for office visit and to talk about her diabetic gastroparesis diagnosis. Patient denied appointment with other providers. Earliest appointment is March 5th, patient would like a sooner appointment. Please advise?

## 2021-06-05 NOTE — TELEPHONE ENCOUNTER
Refills Approved x 2   Rx renewed per Medication Renewal Policy. Medication was last renewed on   Bupropion = 2/17/2019 with 3 refills.  Omeprazole = 5/23/2019 with 3 refills  Last office visit: 10/10/2019 with PCP Dr KASSIE Ruano, [facility] Triage/Med Refill 2/8/2020     Requested Prescriptions   Pending Prescriptions Disp Refills     buPROPion (WELLBUTRIN XL) 150 MG 24 hr tablet [Pharmacy Med Name: BUPROPION XL 150MG TABLETS (24 H)] 90 tablet 3     Sig: TAKE 1 TABLET(150 MG) BY MOUTH DAILY       Tricyclics/Misc Antidepressant/Antianxiety Meds Refill Protocol Passed - 2/8/2020 10:36 AM        Passed - PCP or prescribing provider visit in last year     Last office visit with prescriber/PCP: 6/20/2019 Giselle Munoz MD OR same dept: 6/20/2019 Giselle Munoz MD OR same specialty: 6/20/2019 Giselle Munoz MD  Last physical: 10/10/2019 Last MTM visit: Visit date not found   Next visit within 3 mo: Visit date not found  Next physical within 3 mo: Visit date not found  Prescriber OR PCP: Giselle Munoz MD  Last diagnosis associated with med order: 1. Mild Recurrent Major Depression  - buPROPion (WELLBUTRIN XL) 150 MG 24 hr tablet [Pharmacy Med Name: BUPROPION XL 150MG TABLETS (24 H)]; TAKE 1 TABLET(150 MG) BY MOUTH DAILY  Dispense: 90 tablet; Refill: 3    2. Chronic Reflux Esophagitis  - omeprazole (PRILOSEC) 40 MG capsule [Pharmacy Med Name: OMEPRAZOLE 40MG CAPSULES]; TAKE 1 CAPSULE(40 MG) BY MOUTH DAILY BEFORE BREAKFAST  Dispense: 90 capsule; Refill: 3    If protocol passes may refill for 12 months if within 3 months of last provider visit (or a total of 15 months).             omeprazole (PRILOSEC) 40 MG capsule [Pharmacy Med Name: OMEPRAZOLE 40MG CAPSULES] 90 capsule 3     Sig: TAKE 1 CAPSULE(40 MG) BY MOUTH DAILY BEFORE BREAKFAST       GI Medications Refill Protocol Passed - 2/8/2020 10:36 AM        Passed - PCP or prescribing provider visit in last 12 or next 3 months.      Last office visit with prescriber/PCP: 6/20/2019 Giselle Munoz MD OR same dept: 6/20/2019 Giselle Munoz MD OR same specialty: 6/20/2019 Giselle Munoz MD  Last physical: 10/10/2019 Last MTM visit: Visit date not found   Next visit within 3 mo: Visit date not found  Next physical within 3 mo: Visit date not found  Prescriber OR PCP: Giselle Munoz MD  Last diagnosis associated with med order: 1. Mild Recurrent Major Depression  - buPROPion (WELLBUTRIN XL) 150 MG 24 hr tablet [Pharmacy Med Name: BUPROPION XL 150MG TABLETS (24 H)]; TAKE 1 TABLET(150 MG) BY MOUTH DAILY  Dispense: 90 tablet; Refill: 3    2. Chronic Reflux Esophagitis  - omeprazole (PRILOSEC) 40 MG capsule [Pharmacy Med Name: OMEPRAZOLE 40MG CAPSULES]; TAKE 1 CAPSULE(40 MG) BY MOUTH DAILY BEFORE BREAKFAST  Dispense: 90 capsule; Refill: 3    If protocol passes may refill for 12 months if within 3 months of last provider visit (or a total of 15 months).

## 2021-06-05 NOTE — PATIENT INSTRUCTIONS - HE
It was good to see you in clinic today. This is what we discussed:    1. We need to make sure the esophagus and stomach are normal.  2. We will get two tests: an esophagus X-ray and a test of the emptying of the stomach.  3. Take over the counter Nasacort or Flonase one spray in each nostril daily.  4. Continue Zyrtec (cetirizine) one pill daily. If you can find one with Sudafed (pseudoephedrine), take that instead.  5. Take montelukast one pill at bedtime.  6. Continue omeprazole one pill daily.  7. I will see you in six weeks.  8. Call any time with questions or concerning symptoms.    Iván Bear MD  Pulmonary and Critical Care Medicine  [facility]  Office [phone]

## 2021-06-05 NOTE — TELEPHONE ENCOUNTER
Phone call back from Janet. I did tell her that per your notes, her esophagram was overall normal.  Did let her know that you would be trying to reach her again, but were working nights, and not sure when that call back would be possible.  She was ok with that response.

## 2021-06-05 NOTE — TELEPHONE ENCOUNTER
Pulmonary Telephone Note    Esophagram with limited views due to patient inability to stand but overall normal. Gastric emptying study showed delayed gastric emptying at 4 hours and probable reflux. In the context of the patient's persistent emesis, it may be reasonable to have her seen by gastroenterology for diabetic gastroparesis. I am not convinced that her emesis is directly connected to her cough. Also advised her to discuss dulaglutide with her endocrinologist; I have less experience with this drug, though reports show that nausea is a very common side effect, in 12-21% of patients.    Have tried multiple empiric therapies for her cough, and if adding OTC nasal steroid to her other therapies is ineffective, should consider gabapentin for refractory idiopathic chronic cough. I will be seeing her in follow-up at the end of the month. She is in agreement with the above.    Iván Bear MD  Pulmonary and Critical Care Medicine  [facility]  Cell [phone]  Office [phone]  Pager [phone]

## 2021-06-05 NOTE — TELEPHONE ENCOUNTER
Orders being requested:   2 pairs compression stockings  1 pair diabetic shoes  1 pair diabetic insert    Reason service is needed/diagnosis:   Diabetes  When are orders needed by: Asap  Where to send Orders: Fax: [facility], [phone]  Okay to leave detailed message?  Yes    See [facility] orders in Media Tab 5/18/17, 5/17/17

## 2021-06-05 NOTE — PROGRESS NOTES
Pulmonary Clinic Follow-up Visit    Assessment and Plan:   44 year old female never smoker with a history of severe obesity, urticaria, RLS, osteoarthritis, dyslipidemia, H pylori gastritis, DM2, diabetic neuropathy, costochondritis, GERD, iron deficiency anemia, seasonal allergies, venous insufficiency, back pain, and depression/anxiety, presenting for evaluation of chronic cough and emesis.     Chronic cough and hematemesis: No evidence of an objective parenchymal lung disease on chest CT. Unclear association between her cough and emesis; she has a history of gastritis and GERD, though continues to use NSAIDs for pain, which could cause dyspepsis and nausea; dulaglutide also causes nausea in 10-20% of patients. She notes emesis 30-60 minutes after eating, which could relate to undiagnosed diabetic gastroparesis. DDx of cough includes upper airway cough syndrome due to chronic rhinosinusitis, refractory cough-variant reflux, cough-variant asthma (significant risk of this with her severe obesity). Unfortunately, she has declined to pursue methacholine challenge testing, pulmonary function testing, or empiric inhaled therapy, all due to aversion/gagging with nebulized therapies and inhalers. She had left maxillary sinus opacification on CT, but did not fill the cetirizine-pseudoephedrine or nasal steroid due to cost.     Plan:  - will obtain esophagram and gastric emptying study; I will call her with the results  - advised her to take OTC nasal triamcinolone or fluticasone daily; this only costs $10-13  - continue cetirizine 10 mg daily  - continue omeprazole 40 mg daily  - continue montelukast 10 mg at bedtime  - again would consider dulaglutide as potential contributors to nausea/emesis; I advised the patient to check with Dr. Munoz on a potential alternative therapy  - if refractory cough (though unclear association between this and her emesis), could consider gabapentin 300 mg two times a day for 2 weeks, then  600 mg two times a day for 2 weeks, then 900 mg two times a day  - follow up in 6 weeks  - encouraged her to call any time with questions or concerning symptoms     I appreciate the opportunity to participate in the care of Ms. Flynn. Please feel free to contact me at any time.     CCx: chronic cough and emesis    HPI: 44 year old female never smoker with a history of severe obesity, urticaria, RLS, osteoarthritis, dyslipidemia, H pylori gastritis, DM2, diabetic neuropathy, costochondritis, GERD, iron deficiency anemia, seasonal allergies, venous insufficiency, back pain, and depression/anxiety, presenting for evaluation of chronic cough and emesis. No evidence of an objective parenchymal lung disease on chest CT. Unclear association between her cough and emesis; she has a history of gastritis and GERD, though continues to use NSAIDs for pain, which could cause dyspepsis and nausea; dulaglutide also causes nausea in 10-20% of patients. She notes emesis 30-60 minutes after eating, which could relate to undiagnosed diabetic gastroparesis. DDx of cough includes upper airway cough syndrome due to chronic rhinosinusitis, refractory cough-variant reflux, cough-variant asthma (significant risk of this with her severe obesity). Unfortunately, she has declined to pursue methacholine challenge testing, pulmonary function testing, or empiric inhaled therapy, all due to aversion/gagging with nebulized therapies and inhalers. She had left maxillary sinus opacification on CT, but did not fill the cetirizine-pseudoephedrine or nasal steroid due to cost.    ROS:  A 12-system review was obtained and was negative with the exception of the symptoms endorsed in the history of present illness.    PMH:  BMI 59.1  Urticaria  RLS  Knee OA  DL  H pylori gastritis  DM2  DKA September 2015  Diabetic neuropathy  Costochondritis  GERD  ALDEN  Seasonal allergies  Venous stasis  Plantar fasciitis  Low back pain  Vitamin D  deficiency  MDD/JOSE MARIA    PSH:  Past Surgical History:   Procedure Laterality Date      SECTION  2017     INSERT MIDLINE HE  2015          OK  DELIVERY ONLY      Description:  Section;  Proc Date: 2001;  Comments: fetal distress     OK COLONOSCOPY FLX DX W/COLLJ SPEC WHEN PFRMD N/A 2019    Procedure: COLONOSCOPY;  Surgeon: Frankie Maher MD;  Location: [facility];  Service: Gastroenterology     OK ESOPHAGOGASTRODUODENOSCOPY TRANSORAL DIAGNOSTIC N/A 2019    Procedure: ESOPHAGOGASTRODUODENOSCOPY (EGD);  Surgeon: Frankie Maher MD;  Location: [facility];  Service: Gastroenterology     OK OPEN TX TRIMALLEOLAR ANKLE FX W/O FIX PST LIP Right     Description: Open Treatment Of Trimalleolar Ankle Fracture;  Proc Date: 2009;  Comments: complicated by post-op infections     OK REMOVAL GALLBLADDER      Description: Cholecystectomy;  Recorded: 2009;     pulley release Left     index     US BREAST CORE BIOPSY LEFT Left 2018       Allergies:  Allergies   Allergen Reactions     Vicks Vaporub [Camphor-Eucalyptus Oil-Menthol] Hives     Amoxicillin Nausea And Vomiting     Codeine Nausea Only and Hives     Cranberry Diarrhea     Menthol      Penicillins Nausea And Vomiting     Permethrin Itching     [place] Rash       Family HX:  Family History   Problem Relation Age of Onset     No Medical Problems Sister      Asthma Brother      Emphysema Brother      No Medical Problems Sister      Cancer Mother      Diabetes Mother        Social Hx:  Social History     Socioeconomic History     Marital status: Single     Spouse name: Not on file     Number of children: Not on file     Years of education: Not on file     Highest education level: Not on file   Occupational History     Occupation: Disability for mental health   Social Needs     Financial resource strain: Not on file     Food insecurity:     Worry: Not on file     Inability: Not on file      "Transportation needs:     Medical: Not on file     Non-medical: Not on file   Tobacco Use     Smoking status: Never Smoker     Smokeless tobacco: Never Used     Tobacco comment: family members smoke outside    Substance and Sexual Activity     Alcohol use: No     Drug use: No     Sexual activity: Yes     Partners: Male     Birth control/protection: Condom   Lifestyle     Physical activity:     Days per week: Not on file     Minutes per session: Not on file     Stress: Not on file   Relationships     Social connections:     Talks on phone: Not on file     Gets together: Not on file     Attends Scientologist service: Not on file     Active member of club or organization: Not on file     Attends meetings of clubs or organizations: Not on file     Relationship status: Not on file     Intimate partner violence:     Fear of current or ex partner: Not on file     Emotionally abused: Not on file     Physically abused: Not on file     Forced sexual activity: Not on file   Other Topics Concern     Not on file   Social History Narrative    Lives with Geoff alexander, and 2 roomates    Son has been adopted out       Current Meds:  Current Outpatient Medications   Medication Sig Dispense Refill     BD ULTRA-FINE MILLI PEN NEEDLE 32 gauge x 5/32\" Ndle USE THREE TIMES DAILY 300 each 0     blood glucose meter (GLUCOMETER) Dispense meter covered by pt ins.       BLOOD SUGAR DIAGNOSTIC (TRUETEST TEST STRIPS MISC) Use As Directed. Test 3 times daily       buPROPion (WELLBUTRIN XL) 150 MG 24 hr tablet TAKE 1 TABLET(150 MG) BY MOUTH DAILY 90 tablet 3     cetirizine-pseudoephedrine (ZYRTEC-D) 5-120 mg per tablet Take 1 tablet by mouth 2 (two) times a day. 60 tablet 5     RONAN/IRON chewable tablet CSW 1 T PO D  7     CONTOUR NEXT TEST STRIPS strips TEST TID  3     ergocalciferol (ERGOCALCIFEROL) 50,000 unit capsule Take 1 capsule (50,000 Units total) by mouth once a week. 12 capsule 4     fluocinonide (LIDEX) 0.05 % cream ALBAN  BID      PRF  " "ITCHY  SPOTS    FOR  LESS  THAN  2  WEEKS  3     fluticasone (VERAMYST) 27.5 mcg/actuation nasal spray One spray in each nostril daily 10 g 11     GAVILYTE-G 236-22.74-6.74 -5.86 gram solution        glipiZIDE (GLUCOTROL XL) 10 MG 24 hr tablet Take 1 tablet by mouth daily before breakfast  3     hydrOXYzine HCl (ATARAX) 50 MG tablet TAKE 1 TO 2 TABLETS BY MOUTH EVERY 6 HOURS AS NEEDED FOR ITCHING 120 tablet 11     ibuprofen (ADVIL,MOTRIN) 800 MG tablet Take 1 tablet (800 mg total) by mouth daily as needed for pain. 30 tablet 3     insulin glargine (BASAGLAR KWIKPEN U-100 INSULIN) 100 unit/mL (3 mL) pen Inject 50 Units under the skin at bedtime.              lancets (TRUEPLUS LANCETS) 33 gauge Misc Use As Directed. Check BG 3 tmes daily       metFORMIN (GLUCOPHAGE) 1000 MG tablet TAKE 1 TABLET(1000 MG) BY MOUTH TWICE DAILY WITH MEALS 180 tablet 3     metoclopramide (REGLAN) 5 MG tablet Take 1 tablet (5 mg total) by mouth daily as needed for nausea. 30 tablet 1     miscellaneous medical supply Misc Compression Stockings- custom knee high with zipper 4 each 0     montelukast (SINGULAIR) 10 mg tablet TAKE 1 TABLET(10 MG) BY MOUTH DAILY AT BEDTIME AS DIRECTED 90 tablet 4     NOVOFINE PLUS 32 gauge x 1/6\" Ndle Inject 1 each under the skin daily. With basaglar insulin       omeprazole (PRILOSEC) 40 MG capsule TAKE 1 CAPSULE(40 MG) BY MOUTH DAILY BEFORE BREAKFAST 90 capsule 3     ONETOUCH VERIO FLEX Misc TEST  TID  0     pediatric multivit-iron-min Chew Chew 1 tablet daily. 100 each 4     pioglitazone (ACTOS) 30 MG tablet Take 1 tablet by mouth daily  0     polyethylene glycol (GLYCOLAX) 17 gram/dose powder Poor in 64 oz of gatoraid and drink over 4 hours the day before capsule study 116 g 0     pramipexole (MIRAPEX) 0.5 MG tablet   3     sertraline (ZOLOFT) 100 MG tablet TAKE 2 TABLETS BY MOUTH DAILY 180 tablet 3     traMADol (ULTRAM) 50 mg tablet Take 1 tablet (50 mg total) by mouth daily as needed for pain. 15 tablet 0 " "    TRULICITY 1.5 mg/0.5 mL PnIj INJECT 1.5 MG SUBCUTANEOUS ONCE A WEEK  1     No current facility-administered medications for this visit.        Physical Exam:  /82   Pulse (!) 106   Ht 5' 2\" (1.575 m)   SpO2 98%   Breastfeeding No   BMI 59.08 kg/m    Gen: alert, oriented, no distress  HEENT: nasal turbinates are mildly edematous, no oropharyngeal lesions, no cervical or supraclavicular lymphadenopathy  CV: tachy, regular, no M/G/R  Resp: CTAB, no focal crackles or wheezes  Abd: soft, nontender, no palpable organomegaly  Skin: no apparent rashes  Ext: no cyanosis, clubbing or edema  Neuro: alert, nonfocal    Labs:  reviewed    Imaging studies:  CT chest with contrast (June 2019):  - images directly reviewed, formal interpretation follows:  FINDINGS:   LUNGS AND PLEURA: Multiple 3 mm and smaller pulmonary and pleural-based nodules new from prior. No focal infiltrate or effusion.     MEDIASTINUM: No mass or adenopathy.     LIMITED UPPER ABDOMEN: Hepatic steatosis. Cholecystectomy.     MUSCULOSKELETAL: Degenerative disease. Old left third rib fracture.     IMPRESSION:   CONCLUSION:   1.  Multiple 3 mm and smaller pulmonary and pleural based nodules likely benign though technically indeterminate. If no more recent prior see guidelines below.  2.  Hepatic steatosis.    CT sinuses (November 2019):  - images directly reviewed, formal interpretation follows:  FINDINGS:      FRONTAL SINUSES:  Normal.     ETHMOID SINUSES:  Normal.     SPHENOID SINUSES: Normal.      MAXILLARY SINUSES: Normal right maxillary sinus. Atelectatic left maxillary sinus. The floors of the maxillary sinuses are intact.     NASAL CAVITY/SKULL BASE: Leftward nasal septal deviation with an associated osseous spur. Unremarkable nasal turbinates. The cribriform plate is intact and symmetric. The anterior clinoid processes are not pneumatized.     PARANASAL SINUS DRAINAGE PATHWAYS:  Opacified left infundibulum. The paranasal sinus drainage " pathways are otherwise patent.     NON-SINUS STRUCTURES: No abnormality of the visualized orbits or intracranial compartment.     IMPRESSION:   1.  Atelectatic left maxillary sinus.  2.  The paranasal sinuses are otherwise clear.  3.  Leftward nasal septal deviation and associated osseous spur.    Iván Bear MD  Pulmonary and Critical Care Medicine  [facility]  Cell [phone]  Office [phone]  Pager [phone]

## 2021-06-06 NOTE — TELEPHONE ENCOUNTER
probably best to be off the ibuprofen - her stomach will probably be healthier    Okay to stop the vitamin D short term.     For Gastraparesis:    No carbonated beverages- pop, etc  Nothing with bubbles.     Only cooked fruits/veggies- nothing raw.      Low fat foods and meats- more chicken and fish.      Low fat dairy.      Nothing spicy    Nothing acid- no orange juice, no tomato products, etc    No smoking or alcohol

## 2021-06-06 NOTE — TELEPHONE ENCOUNTER
Who is calling:  patient  Reason for Call:  She reports she cannot take the Vit D and ibuprofen as she cannot afford them.  Also is asking for diet precautions for gastroparesis.  Please advise.    Date of last appointment with primary care: NA  Okay to leave a detailed message: Yes [phone]

## 2021-06-06 NOTE — TELEPHONE ENCOUNTER
Who is calling:  Patient   Reason for Call:  Patient stated to have Giselle Munoz MD call her. Patient declined to give any further information.  Date of last appointment with primary care: 10/10/19  Okay to leave a detailed message: No  [phone]

## 2021-06-06 NOTE — TELEPHONE ENCOUNTER
RN cannot approve Refill Request    RN can NOT refill this medication med is not covered by policy/route to provider. Last office visit: Visit date not found Last Physical: Visit date not found Last MTM visit: Visit date not found Last visit same specialty: 6/20/2019 Giselle Munoz MD.  Next visit within 3 mo: Visit date not found  Next physical within 3 mo: Visit date not found      Deisy Morgan, [facility] Triage/Med Refill 2/15/2020    Requested Prescriptions   Pending Prescriptions Disp Refills     ibuprofen (ADVIL,MOTRIN) 600 MG tablet [Pharmacy Med Name: IBUPROFEN 600MG TABLETS] 90 tablet 0     Sig: TAKE 1 TABLET BY MOUTH EVERY DAY WITH FOOD AS NEEDED FOR PAIN       There is no refill protocol information for this order

## 2021-06-06 NOTE — PROGRESS NOTES
Pulmonary Clinic Follow-up Visit    Assessment and Plan:   44 year old female never smoker with a history of severe obesity, urticaria, RLS, osteoarthritis, dyslipidemia, H pylori gastritis, DM2, diabetic neuropathy, costochondritis, GERD, iron deficiency anemia, depression, anxiety, seasonal allergies, venous insufficiency, back pain, chronic nausea, diabetic gastroparesis, and chronic cough, presenting for follow-up.     Chronic cough, chronic nausea: Primarily severe nausea and emesis, with some associated cough. I advised her to have her dulaglutide stopped, which commonly causes nausea; her endocrinologist has done so, with significant improvement. I also sent her for gastric emptying study, which found diabetic gastroparesis; I sent her to GI and she started promethazine, which is aslo helping. She has found benefit from cetirizine and montelukast, which she continues. On omeprazole 40 mg daily. Would not start gabapentin at this point, as she is under adequate control.     Plan:  - continue promethazine for diabetic gastroparesis as diagnosed with GI  - stays off dulaglutide  - nausea and vomiting, primarily due to dulaglutide and diabetic gastroparesis, have been her primary issue, which should be managed by her PCP, GI, and endocrinology  - continue cetirizine and montelukast  - continue omeprazole 40 mg daily  - follow up in pulmonary clinic as needed     I appreciate the opportunity to participate in the care of Ms. Flynn. Please feel free to contact me at any time.    Iván Bear MD  Pulmonary and Critical Care Medicine  [facility]  Cell [phone]  Office [phone]  Pager [phone]    CCx: chronic nausea and vomiting, chronic cough    HPI: 44 year old female never smoker with a history of severe obesity, urticaria, RLS, osteoarthritis, dyslipidemia, H pylori gastritis, DM2, diabetic neuropathy, costochondritis, GERD, iron deficiency anemia, depression, anxiety, seasonal  allergies, venous insufficiency, back pain, chronic nausea, diabetic gastroparesis, and chronic cough, presenting for follow-up. Primarily severe nausea and emesis, with some associated cough. I advised her to have her dulaglutide stopped, which commonly causes nausea; her endocrinologist has done so, with significant improvement. I also sent her for gastric emptying study, which found diabetic gastroparesis; I sent her to GI and she started promethazine, which is aslo helping. She has found benefit from cetirizine and montelukast, which she continues. On omeprazole 40 mg daily. Would not start gabapentin at this point, as she is under adequate control.    ROS:  A 12-system review was obtained and was negative with the exception of the symptoms endorsed in the history of present illness.    PMH:  BMI 59.1  Urticaria  RLS  Knee OA  DL  H pylori gastritis  DM2  DKA 2015  Diabetic neuropathy  Costochondritis  GERD  ALDEN  Seasonal allergies  Venous stasis  Plantar fasciitis  Low back pain  Vitamin D deficiency  MDD/JOSE MARIA  Diabetic gastroparesis    PSH:  Past Surgical History:   Procedure Laterality Date      SECTION  2017     INSERT MIDLINE HE  2015          IN  DELIVERY ONLY      Description:  Section;  Proc Date: 2001;  Comments: fetal distress     IN COLONOSCOPY FLX DX W/COLLJ SPEC WHEN PFRMD N/A 2019    Procedure: COLONOSCOPY;  Surgeon: Frankie Maher MD;  Location: [facility];  Service: Gastroenterology     IN ESOPHAGOGASTRODUODENOSCOPY TRANSORAL DIAGNOSTIC N/A 2019    Procedure: ESOPHAGOGASTRODUODENOSCOPY (EGD);  Surgeon: Frankie Maher MD;  Location: [facility] OR;  Service: Gastroenterology     IN OPEN TX TRIMALLEOLAR ANKLE FX W/O FIX PST LIP Right     Description: Open Treatment Of Trimalleolar Ankle Fracture;  Proc Date: 2009;  Comments: complicated by post-op infections     IN REMOVAL GALLBLADDER      Description:  Cholecystectomy;  Recorded: 01/19/2009;     pulley release Left     index     US BREAST CORE BIOPSY LEFT Left 11/20/2018       Allergies:  Allergies   Allergen Reactions     Vicks Vaporub [Camphor-Eucalyptus Oil-Menthol] Hives     Amoxicillin Nausea And Vomiting     Codeine Nausea Only and Hives     Cranberry Diarrhea     Menthol      Penicillins Nausea And Vomiting     Permethrin Itching     Cameron Rash       Family HX:  Family History   Problem Relation Age of Onset     No Medical Problems Sister      Asthma Brother      Emphysema Brother      No Medical Problems Sister      Cancer Mother      Diabetes Mother        Social Hx:  Social History     Socioeconomic History     Marital status: Single     Spouse name: Not on file     Number of children: Not on file     Years of education: Not on file     Highest education level: Not on file   Occupational History     Occupation: Disability for mental health   Social Needs     Financial resource strain: Not on file     Food insecurity:     Worry: Not on file     Inability: Not on file     Transportation needs:     Medical: Not on file     Non-medical: Not on file   Tobacco Use     Smoking status: Never Smoker     Smokeless tobacco: Never Used     Tobacco comment: family members smoke outside    Substance and Sexual Activity     Alcohol use: No     Drug use: No     Sexual activity: Yes     Partners: Male     Birth control/protection: Condom   Lifestyle     Physical activity:     Days per week: Not on file     Minutes per session: Not on file     Stress: Not on file   Relationships     Social connections:     Talks on phone: Not on file     Gets together: Not on file     Attends Gnosticism service: Not on file     Active member of club or organization: Not on file     Attends meetings of clubs or organizations: Not on file     Relationship status: Not on file     Intimate partner violence:     Fear of current or ex partner: Not on file     Emotionally abused: Not on file  "    Physically abused: Not on file     Forced sexual activity: Not on file   Other Topics Concern     Not on file   Social History Narrative    Lives with Geoff alexander, and 2 roomates    Son has been adopted out       Current Meds:  Current Outpatient Medications   Medication Sig Dispense Refill     promethazine (PHENERGAN) 12.5 MG tablet Take 12.5 mg by mouth 2 (two) times a day as needed (as needed prior to meals for nausea).       BD ULTRA-FINE MILLI PEN NEEDLE 32 gauge x 5/32\" Ndle USE THREE TIMES DAILY 300 each 0     blood glucose meter (GLUCOMETER) Dispense meter covered by pt ins.       BLOOD SUGAR DIAGNOSTIC (TRUETEST TEST STRIPS MISC) Use As Directed. Test 3 times daily       buPROPion (WELLBUTRIN XL) 150 MG 24 hr tablet Take 1 tablet (150 mg total) by mouth daily. 90 tablet 1     cetirizine-pseudoephedrine (ZYRTEC-D) 5-120 mg per tablet Take 1 tablet by mouth 2 (two) times a day. 60 tablet 5     RONAN/IRON chewable tablet CSW 1 T PO D  7     CONTOUR NEXT TEST STRIPS strips TEST TID  3     ergocalciferol (ERGOCALCIFEROL) 50,000 unit capsule Take 1 capsule (50,000 Units total) by mouth once a week. 12 capsule 4     fluocinonide (LIDEX) 0.05 % cream ALBAN  BID      PRF  ITCHY  SPOTS    FOR  LESS  THAN  2  WEEKS  3     fluticasone (VERAMYST) 27.5 mcg/actuation nasal spray One spray in each nostril daily 10 g 11     GAVILYTE-G 236-22.74-6.74 -5.86 gram solution        glipiZIDE (GLUCOTROL XL) 10 MG 24 hr tablet Take 1 tablet by mouth daily before breakfast  3     hydrOXYzine HCl (ATARAX) 50 MG tablet TAKE 1 TO 2 TABLETS BY MOUTH EVERY 6 HOURS AS NEEDED FOR ITCHING 120 tablet 11     ibuprofen (ADVIL,MOTRIN) 600 MG tablet TAKE 1 TABLET BY MOUTH EVERY DAY WITH FOOD AS NEEDED FOR PAIN 90 tablet 0     ibuprofen (ADVIL,MOTRIN) 800 MG tablet Take 1 tablet (800 mg total) by mouth daily as needed for pain. 30 tablet 3     insulin glargine (BASAGLAR KWIKPEN U-100 INSULIN) 100 unit/mL (3 mL) pen Inject 50 Units under the " "skin at bedtime.              lancets (TRUEPLUS LANCETS) 33 gauge Misc Use As Directed. Check BG 3 tmes daily       metFORMIN (GLUCOPHAGE) 1000 MG tablet TAKE 1 TABLET(1000 MG) BY MOUTH TWICE DAILY WITH MEALS 180 tablet 3     metoclopramide (REGLAN) 5 MG tablet Take 1 tablet (5 mg total) by mouth daily as needed for nausea. 30 tablet 1     miscellaneous medical supply Misc Compression Stockings- custom knee high with zipper 4 each 0     montelukast (SINGULAIR) 10 mg tablet TAKE 1 TABLET(10 MG) BY MOUTH DAILY AT BEDTIME AS DIRECTED 90 tablet 4     NOVOFINE PLUS 32 gauge x 1/6\" Ndle Inject 1 each under the skin daily. With basaglar insulin       omeprazole (PRILOSEC) 40 MG capsule Take 1 capsule (40 mg total) by mouth daily before breakfast. 90 capsule 1     ONETOUCH VERIO FLEX Misc TEST  TID  0     pediatric multivit-iron-min Chew Chew 1 tablet daily. 100 each 4     pioglitazone (ACTOS) 30 MG tablet Take 1 tablet by mouth daily  0     pramipexole (MIRAPEX) 0.5 MG tablet   3     sertraline (ZOLOFT) 100 MG tablet TAKE 2 TABLETS BY MOUTH DAILY 180 tablet 3     traMADol (ULTRAM) 50 mg tablet Take 1 tablet (50 mg total) by mouth daily as needed for pain. 15 tablet 0     TRULICITY 1.5 mg/0.5 mL PnIj INJECT 1.5 MG SUBCUTANEOUS ONCE A WEEK  1     No current facility-administered medications for this visit.        Physical Exam:  /82   Pulse 85   Ht 5' 2\" (1.575 m)   SpO2 98%   Breastfeeding No   BMI 59.08 kg/m    Gen: alert, oriented, no distress, obese in a motorized wheelchair  HEENT: nasal turbinates are unremarkable, no oropharyngeal lesions, no cervical or supraclavicular lymphadenopathy  CV: RRR, no M/G/R  Resp: CTAB, no focal crackles or wheezes  Abd: soft, nontender, no palpable organomegaly  Skin: no apparent rashes  Ext: no cyanosis, clubbing or edema  Neuro: alert, nonfocal    Labs:  reviewed    Imaging studies:  CT chest with contrast (June 2019):  - images directly reviewed, formal interpretation " follows:  FINDINGS:   LUNGS AND PLEURA: Multiple 3 mm and smaller pulmonary and pleural-based nodules new from prior. No focal infiltrate or effusion.     MEDIASTINUM: No mass or adenopathy.     LIMITED UPPER ABDOMEN: Hepatic steatosis. Cholecystectomy.     MUSCULOSKELETAL: Degenerative disease. Old left third rib fracture.     IMPRESSION:   CONCLUSION:   1.  Multiple 3 mm and smaller pulmonary and pleural based nodules likely benign though technically indeterminate. If no more recent prior see guidelines below.  2.  Hepatic steatosis.     CT sinuses (November 2019):  - images directly reviewed, formal interpretation follows:  FINDINGS:      FRONTAL SINUSES:  Normal.     ETHMOID SINUSES:  Normal.     SPHENOID SINUSES: Normal.      MAXILLARY SINUSES: Normal right maxillary sinus. Atelectatic left maxillary sinus. The floors of the maxillary sinuses are intact.     NASAL CAVITY/SKULL BASE: Leftward nasal septal deviation with an associated osseous spur. Unremarkable nasal turbinates. The cribriform plate is intact and symmetric. The anterior clinoid processes are not pneumatized.     PARANASAL SINUS DRAINAGE PATHWAYS:  Opacified left infundibulum. The paranasal sinus drainage pathways are otherwise patent.     NON-SINUS STRUCTURES: No abnormality of the visualized orbits or intracranial compartment.     IMPRESSION:   1.  Atelectatic left maxillary sinus.  2.  The paranasal sinuses are otherwise clear.  3.  Leftward nasal septal deviation and associated osseous spur.

## 2021-06-06 NOTE — PATIENT INSTRUCTIONS - HE
It was good to see you in clinic today. This is what we discussed:    1. Continue your treatment for diabetic gastroparesis.  2. Continue Zyrtec (cetirizine) one pill daily.  3. Continue Singulair (montelukast) one pill at bedtime.  4. Continue Prilosec (omeprazole) 40 mg daily and use the wedge to elevate the head at night.  5. If the cough gets more troublesome, give me a call or come in and we can consider starting gabapentin.  6. Call any time with questions or concerning symptoms.    Iván Bear MD  Pulmonary and Critical Care Medicine  [facility]  Office [phone]  
[facility]

## 2021-06-07 NOTE — TELEPHONE ENCOUNTER
Who is calling:  patient  Reason for Call:  Asking to speak to her doctor regarding her medications. When attempting to ask further questions, caller hung up on writer. Patient called back. Would not give details, just asks to speak to her doctor. Please advise!  Date of last appointment with primary care: 3/20/2020 virtual visit  Okay to leave a detailed message: Yes

## 2021-06-07 NOTE — TELEPHONE ENCOUNTER
Who is calling:  Patient   Reason for Call:  Patient stated that she received a voicemail stating that she is suppose to come in early tomorrow. Not sure if it was a nurse or the reminder call. Caller would like a call back for confirmations.   Date of last appointment with primary care:   Okay to leave a detailed message: Yes

## 2021-06-07 NOTE — TELEPHONE ENCOUNTER
Travel questionnaire was asked. Verified that they have no signs of COVID-19 symptoms.    Patient dropped off PRESCRIBED DIET FORM for Dr. Munoz to fill out. Placed the original copies in the 's slot.    When forms are completed, patient would like it:    Fax to [phone] and mail the original to home address.       Please re-route task back to the  to shred the copied forms and complete the task. Thanks!

## 2021-06-07 NOTE — TELEPHONE ENCOUNTER
----- Message from Giselle Munoz MD sent at 4/17/2020  1:40 PM CDT -----  Please call pt with message below- she won't be able to get a 90 day supply based on her current insurance plan  ----- Message -----  From: Obdulio Sutton, PharmD  Sent: 4/17/2020   1:27 PM CDT  To: Giselle Munoz MD    She has a straight medicaid (non-PMAP) plan for her medications.     None of the medicaid plans allow for 90 day supply (this includes PMAPs).     Based on the online formulary for straight [place], they cover 5000 unit caps, not 5000 unit tabs. I just sent an updated prescription.     ----- Message -----  From: Giselle Munoz MD  Sent: 4/17/2020   1:16 PM CDT  To: Obdulio Sutton, PharmD    Pt isn't getting 90day supply of all of her meds as prescribed.  Any ideas?  Also should be on vitamin D but told this isn't covered by her insurance.  Any ideas?

## 2021-06-07 NOTE — TELEPHONE ENCOUNTER
Forms Request  Name of form/paperwork: Other:  Diet form  Have you been seen for this request: No  Do we have the form: Drop Off  When is form needed by: as soon as possible   How would you like the form returned: Mail and Fax:  [phone], Madeline Johnston  Patient Notified form requests are processed in 3-5 business days: Yes    Okay to leave a detailed message? Yes [phone]

## 2021-06-07 NOTE — TELEPHONE ENCOUNTER
Form faxed to Dr. Munoz [facility] fax number.  Received forms back and faxed to requested fax number and mailed copy to patient's home address.

## 2021-06-07 NOTE — TELEPHONE ENCOUNTER
Called and informed patient that she has a telephone appointment tomorrow at 2:40PM with Dr. Munoz and does not need to come in early for anything.  She verbalized understanding.

## 2021-06-07 NOTE — PROGRESS NOTES
Chart reviewed by Ambulatory Quality and Data team    Please abstract the following data from this visit with this patient into the appropriate field in Epic:    Tests that can be patient reported without a hard copy:        Other Tests found in the patient's chart through Chart Review/Care Everywhere:    Eye exam with ophthalmology on this date: 8/23/2018 with documentation of no diabetic retinopathy.     Note to Abstraction: If this section is blank, no results were found via Chart Review/Care Everywhere.

## 2021-06-07 NOTE — TELEPHONE ENCOUNTER
Spoke with pt and stated provider Elly Munoz MD has approved the mediatation listed below. Pt stated understanding and stated she does not need medication. Pharmacy request medication not her.     Requested Prescriptions     Signed Prescriptions Disp Refills     pramipexole (MIRAPEX) 0.5 MG tablet 180 tablet 1     Sig: TAKE 1 TABLET(0.5 MG) BY MOUTH TWICE DAILY     Authorizing Provider: ELLY MUNOZ

## 2021-06-07 NOTE — PROGRESS NOTES
"Mariola Rdz is a 44 y.o. female who is being evaluated via a billable telephone visit.      The patient has been notified of following:     \"This telephone visit will be conducted via a call between you and your physician/provider. We have found that certain health care needs can be provided without the need for a physical exam.  This service lets us provide the care you need with a short phone conversation.  If a prescription is necessary we can send it directly to your pharmacy.  If lab work is needed we can place an order for that and you can then stop by our lab to have the test done at a later time.    Telephone visits are billed at different rates depending on your insurance coverage. During this emergency period, for some insurers they may be billed the same as an in-person visit.  Please reach out to your insurance provider with any questions.    If during the course of the call the physician/provider feels a telephone visit is not appropriate, you will not be charged for this service.\"    Patient has given verbal consent to a Telephone visit? Yes    Patient would like to receive their AVS by AVS Preference: Mail a copy.    Additional provider notes:   Assessment/Plan:  1. Osteoarthrosis Of The Knee  Well controlled with ibuprofen 800mg 2-3 days a week.  90 day supply sent to pharmacy.  No more gi issues or bleeding.    - ibuprofen (ADVIL,MOTRIN) 800 MG tablet; Take 1 tablet (800 mg total) by mouth daily as needed for pain.  Dispense: 90 tablet; Refill: 3    2. Diabetic gastroparesis (H)  Improved with dietary changes and meds.  90 day supply as much as possible.  Will have pharmacist help work on getting this arranged with her pharmacy.  Pt will need fu with endocrine doctor as scheduled to improve her glucose control and her sx.    - promethazine (PHENERGAN) 12.5 MG tablet; Take 1 tablet (12.5 mg total) by mouth 2 (two) times a day as needed (as needed prior to meals for nausea).  Dispense: 90 " tablet; Refill: 0    Return in about 3 months (around 7/17/2020) for Recheck in clinic.    Patient Education/AVS:  There are no Patient Instructions on file for this visit.    Chief Complaint:  Chief Complaint   Patient presents with     Medication Refill       HPI:   Mariola Rdz is a 44 y.o. female c/o needing a refill on her ibuprofen and vitamin D.  Pharmacy not always honoring 90 day supply as ordered.      Was told that her vitamin D isn't covered by her insurance under 10$ but can't afford this every month.  Ibuprofen is covered by insurance.  Wants 90 day supply.  Using 1 ibuprofen every 2-3 days.  Would like 90 total. Doesn't want to go to the pharmacy every month.      Her nausea is better with promethazine that she takes with her evening meal and sometimes in am if nauseated.  No vomiting and no bleeding.      Still itching at times- hydroxyzine not always helping.        Social History     Tobacco Use   Smoking Status Never Smoker   Smokeless Tobacco Never Used   Tobacco Comment    family members smoke outside      Social History     Substance and Sexual Activity   Sexual Activity Yes     Partners: Male     Birth control/protection: Condom     Social History     Social History Narrative    Lives with alberGeoff crouch, and 2 roomates    Son has been adopted out       Physical Exam:  Vitals from last visit reviewed.   Per pt report at home:  LMP 03/17/2020 (Exact Date)  Patient's last menstrual period was 03/17/2020 (exact date).  Wt Readings from Last 3 Encounters:   10/10/19 (!) 323 lb (146.5 kg)   06/20/19 (!) 328 lb (148.8 kg)   06/07/19 (!) 326 lb (147.9 kg)       JOSE MARIA 7 Total Score: 6 (10/11/2019 10:00 AM)    PHQ-9 Total Score: 4 (10/11/2019 10:00 AM)    PHQ-2 Total Score: 0 (4/17/2020  1:00 PM)    No data recorded    GENERAL: Patient sounds well and able to participate in history and planning.  Pt very talkative today and hard to redirect.  She often interrupted provider and took a while to have  a conversation that we both understood.      Phone call duration:  12 minutes    iGselle Munoz MD

## 2021-06-07 NOTE — PROGRESS NOTES
"Mariola Rdz is a 44 y.o. female who is being evaluated via a billable telephone visit.      The patient has been notified of following:     \"This telephone visit will be conducted via a call between you and your physician/provider. We have found that certain health care needs can be provided without the need for a physical exam.  This service lets us provide the care you need with a short phone conversation.  If a prescription is necessary we can send it directly to your pharmacy.  If lab work is needed we can place an order for that and you can then stop by our lab to have the test done at a later time.    If during the course of the call the physician/provider feels a telephone visit is not appropriate, you will not be charged for this service.\"         Mariola Rdz complains of    Chief Complaint   Patient presents with     Concerns     would like to only speak with provider       I have reviewed and updated the patient's Past Medical History, Social History, Family History and Medication List.    ALLERGIES  Vicks vaporub [camphor-eucalyptus oil-menthol]; Amoxicillin; Codeine; Cranberry; Menthol; Penicillins; Permethrin; and Strawberry    Additional provider notes: pt notes she is frustrated that she cannot get vitamin D twice a week.      Ibuprofen not covered by insurance any more.  Once taking as needed no more than once a day a couple day a week at the most.  Takes with food and no Gi issues.  Takes for headache, back pain or leg pain.      Dx with gastroparesis- wondering if she can get a supplement like boost delivered to her house.  Also has verification of prescribed diet to help with food monthly.      Assessment/Plan:  1. Vitamin D deficiency  Will try to switch to daily rx.  Pt asked to try to sit in the sun in her apartment.  Okay to go outside as long as avoiding other people with covid-19.    - cholecalciferol, vitamin D3, (VITAMIN D3) 5,000 unit Tab; Take 1 tablet (5,000 Units total) " by mouth daily.  Dispense: 90 tablet; Refill: 3    2. Diabetic gastroparesis (H)  Reviewed diet and meds with pt.  Will try to contact her county worker to see if we can ammend her prescribed diet to increase money for food monthly and see about a high fiber supplement to drink daily.      3. Osteoarthrosis Of The Knee  rx sent in today.  If not covered will switch to naproxen.    - ibuprofen (ADVIL,MOTRIN) 800 MG tablet; Take 1 tablet (800 mg total) by mouth daily as needed for pain.  Dispense: 30 tablet; Refill: 3        Phone call duration:  14 minutes

## 2021-06-07 NOTE — TELEPHONE ENCOUNTER
Medication Question or Clarification  Who is calling: patient   What medication are you calling about (include dose and sig)?: cholecalciferol, vitamin D3, 125 mcg (5,000 unit) capsule  Who prescribed the medication?: Giselle Munoz MD    What is your question/concern?: patient states that this medication isn't covered and would like a call back from her provider   [facility]ed [facility]acy[facility]  Okay to leave a detailed message?: Yes

## 2021-06-07 NOTE — TELEPHONE ENCOUNTER
Requested Prescriptions     Pending Prescriptions Disp Refills     pramipexole (MIRAPEX) 0.5 MG tablet [Pharmacy Med Name: PRAMIPEXOLE 0.5MG TABLETS] 180 tablet 0     Sig: TAKE 1 TABLET(0.5 MG) BY MOUTH TWICE DAILY       Arlene Hernandez

## 2021-06-07 NOTE — TELEPHONE ENCOUNTER
Patient Returning Call  Reason for call:  Returning clinic call.  Information relayed to patient:  Patient calls back and in response to question the patient plans to drop of the forms today to the clinic.  Patient has additional questions:  No  If YES, what are your questions/concerns:  NA  Okay to leave a detailed message?: Yes

## 2021-06-07 NOTE — TELEPHONE ENCOUNTER
Sometimes insurance does not cover over the counter medications so she can buy it anyway if she wants to take.

## 2021-06-08 NOTE — TELEPHONE ENCOUNTER
FYI - Status Update  Who is Calling: Patient  Update: Patient stated [employer] will be sending an order request to Giselle Munoz MD, for her to sign off on fixing the patient's scooter. Patient stated she would like this filled out as soon as possible.   Okay to leave a detailed message?:  No return call needed

## 2021-06-08 NOTE — PATIENT INSTRUCTIONS - HE
"Wear compression daily and exercise.    Swelling in the legs can be caused by many reasons. No matter what the reason, treatment usually includes some type of compression. You should wear your compression socks as much as you can. Your compression should be put on first thing in the morning. Take the compression off at night. It is especially important to wear them with long periods of sitting/standing, long car rides or if you will be flying. Going without compression for even brief periods of time can be damaging to your legs and your health.  Compression socks should get replaced usually every 4-6 months. They do not need to be worn at night while in bed. If we have seen you in the last year, we can refill your sock prescription, otherwise your primary care provider is able to refill them for you. Call us with any problems or questions.   Compression Velcro may need to be replaced every 9-12 months.  Often the liners and socks need to be replaced every three or four months.  The neoprene velcro may last up to 2 years.  If the velcro becomes worn a tailor may be able to repair it for you inexpensively.    If you do a lot of standing, it is good to do calf raises to help keep the blood pumping. If you sit a lot at work, it is good to get up periodically to walk around. Elevation of the foot of your bed 4-6\" helps the blood return back to where it is needed.   Please call us if you have any questions [phone]    Thank you for choosing [facility].    "

## 2021-06-08 NOTE — PROGRESS NOTES
Chief Complaint:  Chief Complaint   Patient presents with     Postpartum Care       Postpartum Visit  Patient is here for a postpartum visit. She is 4 weeks postpartum following a low cervical transverse  section. I have fully reviewed the prenatal and intrapartum course. The delivery was at 36 3/7 gestational weeks. Outcome: repeat  section, low transverse incision. Anesthesia: spinal.  Birth Weight: 2950g.  Gender girl.     Postpartum course has been stable- medical concerns as outlined below.  Baby's course has been healthy- but currently in custody of paternal aunt with CPS involvement. Baby is feeding by formula. Bleeding no bleeding now.  Postpartum bleeding for 2 weeks.  Bowel function is normal. Bladder function is normal. Patient is not sexually active.  Next baby not sure.   Contraception method considering is condoms and Plan B. Postpartum depression screening: negative. WIC  yes.  Exercise walking  10-30min.  Total Weight Gain during pregnancy 30 pounds and Weight loss so far 20.  Return to work/school - on SSDI.    Following concerns include:med refills    Diabetes- 8u novolog 2-3 x/day with meals, lantus 25u  Working with Dr owens at [facility]    Needs a new rx for her compression stockings    Has a PCA 4hrs/day to help with bathing and dressing and preparing meals and caring for her incision.      Would like rx for a pill starts with H that she takes at nighttime to help her sleep and with her itching.    She's wondering if she still needs to take her iron pill and why she needs to take this.    He is requesting that I sign the prescription for her knee brace and a heating pad that she can use for pain control of her knee and back pain.  She declines physical therapy.  She also requests a prescription for Norco to take for her pain as this has helped her in the past.    She and her friend are very focused on trying to get custody of her daughter back.    ROS:  10 point review  of symptoms all negative except as outlined in the HPI above.    Med list, active problem list, PMH, Surgical Hx, Family Hx reviewed and updated as part of this encounter    Physical Exam:  Visit Vitals     /81 (Patient Site: Right Arm, Patient Position: Sitting, Cuff Size: Child)  Comment (Patient Site): right forearm     Pulse 96     Resp 20     Wt (!) 233 lb (105.7 kg)     BMI 44.02 kg/m2    Body mass index is 44.02 kg/(m^2).  Vital signs reviewed    Wt Readings from Last 3 Encounters:   02/15/17 (!) 233 lb (105.7 kg)   08/04/16 (!) 241 lb 3.2 oz (109.4 kg)   07/28/16 (!) 234 lb 6.4 oz (106.3 kg)       EPDS Score: 3    Physical Exam:  All normal as below except abnormalities include: Majority of time was spent talking with patient and updating her chart etc.  Due to time constraint and I will do a full physical as requested. Eye contact today.  She is appropriately interactive and very talkative today she is engaged.  In asking appropriate questions.  She is here with her boyfriend and they seem appropriately interactive and supportive of each other.  She distress properly for the season on occasion.  Overall she looks really well compared to last year prior to her pregnancy.  General is a  41 y.o. female sitting comfortably in no apparent distress.   Neck: Supple without lymphadenopathy or thyromegally  CV: Regular rate and rhythm S1S2 without rubs, murmurs or gallops,   Lungs: Clear to auscultation bilaterally  Extremities: Warm, tr Edema, 2+ Pedal and radial pulses bilaterally  Skin: No lesions or rashes noted  Neuro: Able to ambulate around the exam room with equal movement, strength and normal coordination of the upper and lower extremeties symmetrically  Pelvic:Not examined    Results for orders placed or performed in visit on 02/15/17   Glycosylated Hemoglobin A1c   Result Value Ref Range    Hemoglobin A1c 7.2 (H) 3.5 - 6.0 %   HM2(CBC w/o Differential)   Result Value Ref Range    WBC 9.0 4.0 - 11.0  thou/uL    RBC 4.69 3.80 - 5.40 mill/uL    Hemoglobin 13.7 12.0 - 16.0 g/dL    Hematocrit 41.4 35.0 - 47.0 %    MCV 88 80 - 100 fL    MCH 29.1 27.0 - 34.0 pg    MCHC 33.0 32.0 - 36.0 g/dL    RDW 12.2 11.0 - 14.5 %    Platelets 440 140 - 440 thou/uL    MPV 8.1 7.0 - 10.0 fL   Vitamin D, Total (25-Hydroxy)   Result Value Ref Range    Vitamin D, Total (25-Hydroxy) 25.7 (L) 30.0 - 80.0 ng/mL   Basic Metabolic Panel   Result Value Ref Range    Sodium 138 136 - 145 mmol/L    Potassium 4.3 3.5 - 5.0 mmol/L    Chloride 102 98 - 107 mmol/L    CO2 26 22 - 31 mmol/L    Anion Gap, Calculation 10 5 - 18 mmol/L    Glucose 266 (H) 70 - 125 mg/dL    Calcium 8.8 8.5 - 10.5 mg/dL    BUN 11 8 - 22 mg/dL    Creatinine 0.73 0.60 - 1.10 mg/dL    GFR MDRD Af Amer >60 >60 mL/min/1.73m2    GFR MDRD Non Af Amer >60 >60 mL/min/1.73m2   LDL Cholesterol, Direct   Result Value Ref Range    Direct LDL 59 <=129 mg/dl   Magnesium   Result Value Ref Range    Magnesium 1.8 1.8 - 2.6 mg/dL   Ferritin   Result Value Ref Range    Ferritin 20 10 - 130 ng/mL   Iron and Transferrin Iron Binding Capacity   Result Value Ref Range    Iron 65 42 - 175 ug/dL    Transferrin 271 212 - 360 mg/dL    Transferrin Saturation 19 (L) 20 - 50 %    Transferrin  313 - 563 ug/dL       Assessment/Plan:  Discussed return to work/school plans.  Postpartum depression assessment and coping techniques. Fatigue and sleep strategies.  Breastfeeding and pumping as needed. Body changes and exercise/weight loss techniques.  Timing of next pregnancy and birth control prescribed as below.  Next pap smear due 2019.      1. Morbid obesity  Counseled on lifestyle modification and just general how to take care of herself.  Continue with home care support.    2. Allergic rhinitis  Continue Singulair refill sent in.  - montelukast (SINGULAIR) 10 mg tablet; TAKE 1 TABLET BY MOUTH DAILY AS DIRECTED  Dispense: 90 tablet; Refill: 3    3. Mild Recurrent Major Depression  Patient does not  "currently have a mental health provider.  She knows she will be getting mental health services through  protection.  She declines referral to psychology at this time.  Continue Zoloft as below.  Restart her hydroxyzine to help with itching and sleep.  - sertraline (ZOLOFT) 100 MG tablet; TAKE 2 TABLETS BY MOUTH DAILY  Dispense: 180 tablet; Refill: 3  - hydrOXYzine (ATARAX) 50 MG tablet; 1-2 at bedtime as needed for sleep and itching  Dispense: 60 tablet; Refill: 11    4. Type 2 diabetes mellitus with complication, with long-term current use of insulin  Patient will continue to see endocrinologist through a [facility] as she has a good rapport with this doctor.  Did send a prescription in for her Needles but asked that she have her NovoLog and Lantus refilled by her endocrinologist so that they can make appropriate dose adjustments after her visits.  Vaccines are up-to-date.  Blood work as below.  Would recommend starting statin therapy at the age of 48, especially since patient is not on reliable contraception.  No need for aspirin therapy until age 48.  Counseled on contraception and sent a prescription as below.  - pen needle, diabetic (BD ULTRA-FINE MILLI PEN NEEDLES) 32 gauge x 5/32\" Ndle; USE THREE TIMES DAILY WITH LANCETS AND NOVOLOG  Dispense: 100 each; Refill: 3  - NOVOLOG FLEXPEN 100 unit/mL injection pen; Inject 8 Units under the skin 3 (three) times a day before meals.  Dispense: 5 Pre-filled Pen Syringe; Refill: 3  - LANTUS SOLOSTAR 100 unit/mL (3 mL) pen; Inject 25 Units under the skin bedtime.  Dispense: 5 adj dose pen; Refill: 3  - Glycosylated Hemoglobin A1c  - Basic Metabolic Panel  - LDL Cholesterol, Direct  - Magnesium  - Ferritin  - Iron and Transferrin Iron Binding Capacity    5. Vitamin D deficiency  Discussed importance of vitamin D supplementation.  She should be taking her vitamin D on a daily basis.  - Vitamin D, Total (25-Hydroxy)    6. Restless Legs Syndrome  Patient has a " history of restless leg syndrome and I think that's why she was prescribed iron.  If her iron levels are sufficient she gets really stop the iron if she like to but she may have return of her restless leg symptoms.  - HM2(CBC w/o Differential)  - Ferritin  - Iron and Transferrin Iron Binding Capacity    7. Encounter for other contraceptive management  Patient declines formal birth control at this time.  Her boyfriend does note appropriate concern about getting pregnant and does request a prescription for condoms and plan be sent to the pharmacy so they can have this on hand as a start they're sexually active relationship.  She is encouraged to continue on her prenatal vitamins are her multivitamin with folic acid in case they do accidentally get pregnant.  - condoms latex lubricated Fanny; Use as needed for intercourse  Dispense: 12 each; Refill: 11  - levonorgestrel (PLAN B) tablet; Take 1 tablet (1.5 mg total) by mouth once for 1 dose.  Dispense: 1 tablet; Refill: 5    8. Osteoarthritis of both knees, unspecified osteoarthritis type  Patient is having a history of osteoarthritis of the knees.  She does request a knee brace which seems appropriate.  Prescription sent in.  A therapy to start working on deep reconditioning after her pregnancy.  - Ambulatory referral to PT/OT    9. Chronic midline low back pain without sciatica  Plan as above.  I did sign a prescription for heating pad as requested.  - Ambulatory referral to PT/OT

## 2021-06-08 NOTE — TELEPHONE ENCOUNTER
Pt called with concern, she is experiencing R leg edema. She is not wearing compression stockings but elevating. No signs or symptoms of infection. Her LOV was 6/2015.     She is going to try to send an email of each leg but she doesn't sound confident. Please advise.

## 2021-06-08 NOTE — PROGRESS NOTES
Referred By: Giselle Munoz MD    Date Of Service: 05/28/2020    Chief Complaint: Bilateral leg swelling    History of Present Illness:    Mariola Rdz presents to the [facility] as a new consult to evaluate bilateral leg swelling mainly in the right leg now.  I actually saw her last back on 6/18/2015 for bilateral leg swelling felt to be due to venous insufficiency and stasis with acquired lymphedema due to morbid obesity complicated by type 2 diabetes with peripheral neuropathy.  She had been treated with compression garments education, range of motion, exercise and elevation.  She was to continue use compression stockings and exercise.  She was having problems with left foot plantar fasciitis at the time and I ordered full-length insoles with arch supports with diabetic shoes.  She also agreed to bariatric medicine referral and this was written for.  She was having left great toe pain and she had a callus which was pared down.  She was to follow-up in 2 months but was lost to follow-up.  Since I last saw her she has been having more problems with DM with gastroparesis and poor diabetic control.  She also broke her distal left tib/fib 5/30/18.  Today she reports she feels the swelling has gone up.  She has continued to have problems controlling her weight.  She replaces her stockings yearly.  She is not on a regular exercise program.  She previously went to bariatrics but she had she was not comfortable with that.  She has tried to eat better. There has been no new numbness, tingling or weakness.  There have been no new masses, rashes, or swellings of any other joints. There has been no new decrease in appetite, unexplained weight loss, abdominal bloating, bowel or bladder changes and irregular bleeding    Past Medical History:   Diagnosis Date     Chronic reflux esophagitis      Costochondritis      Diabetes mellitus type 2, uncontrolled, with complications (H)   "    Diabetic gastroparesis (H)      Diabetic neuropathy (H)      DKA (diabetic ketoacidoses) (H) 2015     Helicobacter pylori gastritis      Hypercholesteremia      Osteoarthritis, knee      Restless leg syndrome      S/P  section 5/28/2019    X 2; last on 2017     Urticaria        Past Surgical History:   Procedure Laterality Date      SECTION  2017     INSERT MIDLINE HE  2015          LA  DELIVERY ONLY      Description:  Section;  Proc Date: 2001;  Comments: fetal distress     LA COLONOSCOPY FLX DX W/COLLJ SPEC WHEN PFRMD N/A 2019    Procedure: COLONOSCOPY;  Surgeon: Frankie Maher MD;  Location: [facility] OR;  Service: Gastroenterology     LA ESOPHAGOGASTRODUODENOSCOPY TRANSORAL DIAGNOSTIC N/A 2019    Procedure: ESOPHAGOGASTRODUODENOSCOPY (EGD);  Surgeon: Frankie Maher MD;  Location: [facility] OR;  Service: Gastroenterology     LA OPEN TX TRIMALLEOLAR ANKLE FX W/O FIX PST LIP Right     Description: Open Treatment Of Trimalleolar Ankle Fracture;  Proc Date: 2009;  Comments: complicated by post-op infections     LA REMOVAL GALLBLADDER      Description: Cholecystectomy;  Recorded: 2009;     pulley release Left     index     US BREAST CORE BIOPSY LEFT Left 2018         Current Outpatient Medications:      BD ULTRA-FINE MILLI PEN NEEDLE 32 gauge x 32\" Ndle, USE THREE TIMES DAILY, Disp: 300 each, Rfl: 0     blood glucose meter (GLUCOMETER), Dispense meter covered by pt ins., Disp: , Rfl:      BLOOD SUGAR DIAGNOSTIC (TRUETEST TEST STRIPS MISC), Use As Directed. Test 3 times daily, Disp: , Rfl:      buPROPion (WELLBUTRIN XL) 150 MG 24 hr tablet, Take 1 tablet (150 mg total) by mouth daily., Disp: 90 tablet, Rfl: 1     cetirizine-pseudoephedrine (ZYRTEC-D) 5-120 mg per tablet, Take 1 tablet by mouth 2 (two) times a day., Disp: 60 tablet, Rfl: 5     RONAN/IRON chewable tablet, CSW 1 T PO D, Disp: , Rfl: 7     " "cholecalciferol, vitamin D3, 125 mcg (5,000 unit) capsule, Take 1 capsule (5,000 Units total) by mouth daily., Disp: 90 capsule, Rfl: 3     CONTOUR NEXT TEST STRIPS strips, TEST TID, Disp: , Rfl: 3     GAVILYTE-G 236-22.74-6.74 -5.86 gram solution, , Disp: , Rfl:      glipiZIDE (GLUCOTROL XL) 10 MG 24 hr tablet, Take 1 tablet by mouth daily before breakfast, Disp: , Rfl: 3     hydrOXYzine HCl (ATARAX) 50 MG tablet, TAKE 1 TO 2 TABLETS BY MOUTH EVERY 6 HOURS AS NEEDED FOR ITCHING, Disp: 120 tablet, Rfl: 11     ibuprofen (ADVIL,MOTRIN) 800 MG tablet, Take 1 tablet (800 mg total) by mouth daily as needed for pain., Disp: 90 tablet, Rfl: 3     insulin glargine (LANTUS SOLOSTAR PEN) 100 unit/mL (3 mL) pen, Inject 35 Units under the skin at bedtime. (Patient taking differently: Inject 20 Units under the skin at bedtime. ), Disp: 5 adj dose pen, Rfl: 0     lancets (TRUEPLUS LANCETS) 33 gauge Misc, Use As Directed. Check BG 3 tmes daily, Disp: , Rfl:      metFORMIN (GLUCOPHAGE) 1000 MG tablet, TAKE 1 TABLET(1000 MG) BY MOUTH TWICE DAILY WITH MEALS, Disp: 180 tablet, Rfl: 3     metoclopramide (REGLAN) 5 MG tablet, Take 1 tablet (5 mg total) by mouth daily as needed for nausea., Disp: 30 tablet, Rfl: 1     miscellaneous medical supply Misc, Compression Stockings- custom knee high with zipper, Disp: 4 each, Rfl: 0     montelukast (SINGULAIR) 10 mg tablet, TAKE 1 TABLET(10 MG) BY MOUTH DAILY AT BEDTIME AS DIRECTED, Disp: 90 tablet, Rfl: 4     NOVOFINE PLUS 32 gauge x 1/6\" Ndle, Inject 1 each under the skin daily. With basaglar insulin, Disp: , Rfl:      omeprazole (PRILOSEC) 40 MG capsule, Take 1 capsule (40 mg total) by mouth daily before breakfast., Disp: 90 capsule, Rfl: 1     ONETOUCH VERIO FLEX Misc, TEST  TID, Disp: , Rfl: 0     pioglitazone (ACTOS) 30 MG tablet, Take 1 tablet by mouth daily, Disp: , Rfl: 0     polyethylene glycol (PURELAX) 17 gram/dose powder, take one capful daily mixed in food or drink, Disp: , Rfl: "      pramipexole (MIRAPEX) 0.5 MG tablet, TAKE 1 TABLET(0.5 MG) BY MOUTH TWICE DAILY, Disp: 180 tablet, Rfl: 1     promethazine (PHENERGAN) 12.5 MG tablet, Take 1 tablet (12.5 mg total) by mouth 2 (two) times a day as needed (as needed prior to meals for nausea)., Disp: 90 tablet, Rfl: 0     sertraline (ZOLOFT) 100 MG tablet, TAKE 2 TABLETS BY MOUTH DAILY, Disp: 180 tablet, Rfl: 3     traMADol (ULTRAM) 50 mg tablet, Take 1 tablet (50 mg total) by mouth daily as needed for pain., Disp: 15 tablet, Rfl: 0     TRULICITY 1.5 mg/0.5 mL PnIj, INJECT 1.5 MG SUBCUTANEOUS ONCE A WEEK, Disp: , Rfl: 1     gabapentin (NEURONTIN) 300 MG capsule, Take 300 mg by mouth daily., Disp: , Rfl:     Allergies   Allergen Reactions     Vicks Vaporub [Camphor-Eucalyptus Oil-Menthol] Hives     Amoxicillin Nausea And Vomiting     Codeine Nausea Only and Hives     Cranberry Diarrhea     Menthol      Penicillins Nausea And Vomiting     Permethrin Itching     [place] Rash       Social History     Socioeconomic History     Marital status: Single     Spouse name: Not on file     Number of children: 2     Years of education: Not on file     Highest education level: High school graduate   Occupational History     Occupation: Disability for mental health   Social Needs     Financial resource strain: Not on file     Food insecurity     Worry: Not on file     Inability: Not on file     Transportation needs     Medical: Not on file     Non-medical: Not on file   Tobacco Use     Smoking status: Never Smoker     Smokeless tobacco: Never Used     Tobacco comment: family members smoke outside    Substance and Sexual Activity     Alcohol use: No     Drug use: No     Sexual activity: Yes     Partners: Male     Birth control/protection: Condom   Lifestyle     Physical activity     Days per week: Not on file     Minutes per session: Not on file     Stress: Not on file   Relationships     Social connections     Talks on phone: Not on file     Gets together:  Not on file     Attends [employer] service: Not on file     Active member of club or organization: Not on file     Attends meetings of clubs or organizations: Not on file     Relationship status: Not on file     Intimate partner violence     Fear of current or ex partner: Not on file     Emotionally abused: Not on file     Physically abused: Not on file     Forced sexual activity: Not on file   Other Topics Concern     Not on file   Social History Narrative    Lives with fiance, Geoff, and 2 roomates    Son has been adopted out       Family History   Problem Relation Age of Onset     No Medical Problems Sister      Asthma Brother      Emphysema Brother      No Medical Problems Sister      Cancer Mother      Diabetes Mother        Review of Systems:  Review of systems is otherwise negative, except as noted above.  Full 12 point review of systems was completed.    Radiology/Diagnostic Studies:    I personally reviewed the following imaging results today and those on care everywhere, if indicated    EXAM: CT ABDOMEN PELVIS W ORAL W IV CONTRAST  LOCATION: [facility]  DATE/TIME: 4/9/2019 2:19 PM     INDICATION: Abdominal pain.  COMPARISON: CT scan of the abdomen and pelvis, 04/22/2014.  TECHNIQUE: Helical enhanced thin-section CT scan of the abdomen and pelvis was performed following injection of IV contrast. Multiplanar reformats were obtained. Dose reduction techniques were used.  CONTRAST: Iohexol (Omni) 100 mL.     FINDINGS:   LUNG BASES: Negative.     ABDOMEN: Cholecystectomy. The liver, spleen, pancreas, adrenal glands, and left kidney are grossly normal. There is a 12 mm cyst involving the midportion right kidney which is decreased in size from comparison CT scan.     The abdominal aorta is normal in caliber. There is no lymphadenopathy.     No bowel obstruction or abnormal bowel wall thickening.     PELVIS: The urinary bladder is grossly normal. No pelvic free fluid or lymphadenopathy. No evidence of  appendicitis.     MUSCULOSKELETAL: Negative.     IMPRESSION:   CONCLUSION:   1.  No bowel obstruction or intra-abdominal inflammation. No definite findings identified to explain patient's symptoms.     XR ANKLE LEFT 2 VWS  9/18/2018 2:01 PM     INDICATION: Unspecified fracture of shaft of unspecified fibula, initial encounter for closed fracture  COMPARISON: 05/30/2018.     FINDINGS: No change in alignment distal fibular fracture there has been some interval callus formation consistent with early healing. No new fracture. No dislocation. There is a plantar calcaneal spur.    Laboratory Studies:  I personally reviewed the following lab results today and those on care everywhere, if indicated      Lab Results   Component Value Date    SEDRATE 50 (H) 07/08/2009         Lab Results   Component Value Date    CRP 3.3 (H) 09/13/2015           Lab Results   Component Value Date    CREATININE 0.61 05/19/2020      Lab Results   Component Value Date    HGBA1C 10.1 (!) 02/13/2020           Lab Results   Component Value Date    BUN 10 05/19/2020              Lab Results   Component Value Date    ALBUMIN 3.0 (L) 10/10/2019       Vitamin D, Total (25-Hydroxy)   Date Value Ref Range Status   10/10/2019 19.6 (L) 30.0 - 80.0 ng/mL Final       Lab Results   Component Value Date    TSH 1.90 05/19/2020     Lab Results   Component Value Date    WBC 8.5 05/19/2020    HGB 10.3 (L) 05/19/2020    HCT 34.9 (L) 05/19/2020    MCV 74 (L) 05/19/2020     (H) 05/19/2020       Physical Exam:  Vitals:    05/28/20 0831   BP: 142/64   Pulse: 84   Resp: 14   Temp: 98.3  F (36.8  C)     BMI 64.2 weight 351 pounds    See flow chart for circumferential measures.    Vasc Edema 6/18/2015 5/28/2020   Right just above MTP 21.9 20.8   Right Ankle 26.1 25   Right Widest Calf 51.8 55.5   Right Thigh Up 10cm 77.1 78.6   Left - just above MTP 20.7 20.5   Left Ankle 24.6 24.1   Left Widest Calf 48.7 48   Left Thigh Up 10cm 68.3 68.5     Fairly stable measures  with right leg slightly up proximally as compared to left.     General:  44 y.o. female in no apparent distress.      Psych: Alert and oriented x 3.  Cooperative. Affect normal.    HEENT: Atraumatic, normocephalic    Respiratory:  Lungs are clear to auscultation throughout with full inspiration    Cardiovascular: Normal S1, S2 without murmur, gallop or rub.  No audible carotid bruits. Regular rhythm.     Abdominal: Normal bowel sounds without any pain, guarding or rigidity. No inguinal lymphadenopathy palpated.  Exam compromised by morbid obesity.    Musculoskeletal:  Normal range of motion in hips, knees and ankles bilaterally.  There is no active joint synovitis, erythema, swelling or joint laxity.     Neurological:  Sensation is intact to pin prick and light touch in both legs and decreased on monofilament testing in the feet bilaterally.  Strength testing is normal in hip flexion, knee flexion, knee extension, ankle dorsiflexion and great toe extension bilaterally. Deep tendon reflexes knee jerks and ankle jerks are decreased bilaterally.      Vascular: Dorsalis pedis and posterior tibialis pulses are strong and equal bilaterally and reveal audible biphasic waves with a hand held doppler bilaterally. There are no significant telangietasias, medial ankle venous flares, venous varicosities  and spider veins .  Some very small venous varicosities are noted in the proximal thighs.    Integumentary: Skin of the legs is uniformly warm and dry, with hyperkeratosis and keratoderma and with positive Stemmer's Sign .  Nails are normal    Impression:    1. Bilateral leg swelling  2. Venous stasis with hypertension of both legs bilaterally  3. Acquired lymphedema  4. Type 2 diabetes mellitus with peripheral neuropathy  5. Morbid obesity    Plan:  1. Type of swelling was reviewed in detail with the patient.  Her swelling appears mainly to be dependent swelling with venous hypertension.  There is some associated acquired  lymphedema but this is minimal.  This is further complicated by type 2 diabetes with early peripheral neuropathy.  The main problem however is her weight which is greatly contributing to many of her medical problems.  Questions were answered and education was completed.  2. We talked about the importance of wearing regular compression.  3. Venous insufficiency study was ordered in view of the right leg feeling bigger than the other to the patient.  Fortunately this was normal.  There is no evidence of DVT and no evidence of significant insufficiency.  4. The weight is a major problem for her.  In the future I think she needs to relook at going to bariatric medicine again.  I congratulated her on working on her diet at home.  5. Discussed importance of and how to exercise on a regular basis.  Modifications reviewed.  Suggestions were made with use of classes on the Internet, cable TV.  These are many including chair exercises and low impact.  We discussed getting on a regular schedule.  6. Patient will follow up in 3 months, or when needed.  If any questions or concerns she will contact the clinic.     Thank you very much for referring Mariola Rdz.  If you have any questions please feel free to contact me at [phone].    Time spent with patient 60 minutes with greater than 50% time in consultation, education and coordination of care, excluding procedures.     Kylah Wheeler MD, FABWMS, FACCWS, FAAPMR  Medical Director Wound Care and Lymphedema  [facility]  [phone]    This note was dictated using a voice recognition software.  Any grammatical or context distortion are unintentional and inherent to the software.

## 2021-06-09 NOTE — PROGRESS NOTES
[facility] Office Visit    Chief Complaint:  Chief Complaint   Patient presents with     Follow-up         Assessment/Plan:  1. Restless Legs Syndrome  Chronic problem- pt has been on requip at dosing below for well over a year prior to her recent pregnancy.  She stopped this med during pregnancy and seems to have restarted this on her own with a bottle left over from before her pregnancy.  Okay to restart. Reminded to keep a high iron diet, sleep hygene, regular exercise and stretching prior to bed.  Will communicate with her home care company to make sure meds are correct.  MTM with her next f/u at [facility]   - rOPINIRole (REQUIP) 1 MG tablet; Take 1 in the morning and 4 at bedtime  Dispense: 150 tablet; Refill: 1    2. Venous stasis  Will work with pt to get compression stockings from [employer].      3. Morbid obesity with BMI of 45.0-49.9, adult  Encouraged to start going for a walk daily.      4. Mild Recurrent Major Depression  Stable per pt- not currently doing cognitive tx or working with psychiatrist.  Pt declines restarting these services stating that she is too busy trying to get custody of her daughter back. Reminded that mental health management will be a big part of her parenting.  Pt notes that she has difficulty getting up in the morning due to her meds and depression at times.  Pt reports being on sertraline 100mg daily, hydroxyzine to help with sleep, and wellbutrin 150mg daily.      5. Type 2 diabetes mellitus with complication, with long-term current use of insulin  Currently working with [facility].  Off meal time insulin and on oral meds plus lantus.  Continue to work with this clinic as she has trust in her current provider.      Return in about 6 weeks (around 5/10/2017).  The following are part of a depression follow up plan for the patient:  implementation of measures to provide psychological support  The following high BMI interventions were performed this visit:  encouragement to exercise and lifestyle education regarding diet    Patient Education/AVS:  There are no Patient Instructions on file for this visit.    HPI:   Mariola Rdz is a 41 y.o. female c/o f/u on her meds.  Pt requested refills on her requip and her wellbutrin recently.  According to our records and pharmacy she hadn't filled these since last summer and stopped early in her pregnancy.  Pt notes she has been taking her requip at bedtime 1 in the day and 4 at bedtime started by Dr Castro years ago.  Pt notes she also restarted her wellbutrin 150mg daily.      Seeing her daughter 2x/week who is still in [place] with her boyfriends sister who has custody.  Needs a note saying that she is coming in for her visits and that she is complying with medications.  Hasn't gotten in for her mental health eval yet.  Working on her parenting evalution.  Notes she has a lot to deal with getting her appointments in and paperwork filed before the upcoming court date.  Has an RN coming out to her house every 2-3 months.  Currently setting up her own meds.  Has a PCA who helps order her meds and picks them up, etc.  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      Condoms are not covered by insurance.      Working with [facility] for diabetes after her pregnancy.  Off meal time insulin and switching to oral meds.  On lantus daily as well.      Never got her compression stockings b/c she wants to get her diabetic shoes and stockings at the same time.      History summarized from1-2:reviewed note from Endocrine 2/2017 outlining to stop meal time insulin and start actos plus metformin and cont lantus  Old Records-1:care everywhere consent signed and records obtained.    Radiology tests reviewed-1: na  Lab tests reviewed-1: 2017  Medicine tests reviewed-1: na    Physical Exam:  /71 (Patient Site: Right Arm, Patient Position: Sitting) Comment (Patient Site): forearm  Pulse 86  Resp 20  Wt (!) 246 lb (111.6 kg)  BMI 46.48 kg/m2 Body  mass index is 46.48 kg/(m^2). No LMP recorded.  Vital signs reviewed  Wt Readings from Last 3 Encounters:   03/29/17 (!) 246 lb (111.6 kg)   02/15/17 (!) 233 lb (105.7 kg)   08/04/16 (!) 241 lb 3.2 oz (109.4 kg)     History   Smoking Status     Never Smoker   Smokeless Tobacco     Never Used     Comment: Smoke exposure     History   Sexual Activity     Sexual activity: Yes     Partners: Male     Birth control/ protection: None     No Data Recorded  PHQ-9 Total Score: 4 (3/30/2017  7:00 AM)  PHQ-2 Total Score: 2 (3/30/2017  7:00 AM)  Depression Follow-up Plan: mental health care assessment (3/29/2017  2:00 PM)  No Data Recorded    All normal as below except abnormalities include: foot exam shows mild callous and dry skin, decreased 1+ pulses, cool to touch, decreased hair  General is a  41 y.o. female sitting comfortably in no apparent distress.   Neck: Supple without lymphadenopathy or thyromegally  CV: Regular rate and rhythm S1S2 without rubs, murmurs or gallops,   Lungs: Clear to auscultation bilaterally  Extremities: Warm, No Edema, 2+ Pedal and radial pulses bilaterally  Skin: No lesions or rashes noted  Neuro/MSK: Able to ambulate around the exam room with equal movement, strength and normal coordination of the upper and lower extremeties symmetrically    Results for orders placed or performed in visit on 02/15/17   Glycosylated Hemoglobin A1c   Result Value Ref Range    Hemoglobin A1c 7.2 (H) 3.5 - 6.0 %   HM2(CBC w/o Differential)   Result Value Ref Range    WBC 9.0 4.0 - 11.0 thou/uL    RBC 4.69 3.80 - 5.40 mill/uL    Hemoglobin 13.7 12.0 - 16.0 g/dL    Hematocrit 41.4 35.0 - 47.0 %    MCV 88 80 - 100 fL    MCH 29.1 27.0 - 34.0 pg    MCHC 33.0 32.0 - 36.0 g/dL    RDW 12.2 11.0 - 14.5 %    Platelets 440 140 - 440 thou/uL    MPV 8.1 7.0 - 10.0 fL   Vitamin D, Total (25-Hydroxy)   Result Value Ref Range    Vitamin D, Total (25-Hydroxy) 25.7 (L) 30.0 - 80.0 ng/mL   Basic Metabolic Panel   Result Value Ref Range     Sodium 138 136 - 145 mmol/L    Potassium 4.3 3.5 - 5.0 mmol/L    Chloride 102 98 - 107 mmol/L    CO2 26 22 - 31 mmol/L    Anion Gap, Calculation 10 5 - 18 mmol/L    Glucose 266 (H) 70 - 125 mg/dL    Calcium 8.8 8.5 - 10.5 mg/dL    BUN 11 8 - 22 mg/dL    Creatinine 0.73 0.60 - 1.10 mg/dL    GFR MDRD Af Amer >60 >60 mL/min/1.73m2    GFR MDRD Non Af Amer >60 >60 mL/min/1.73m2   LDL Cholesterol, Direct   Result Value Ref Range    Direct LDL 59 <=129 mg/dl   Magnesium   Result Value Ref Range    Magnesium 1.8 1.8 - 2.6 mg/dL   Ferritin   Result Value Ref Range    Ferritin 20 10 - 130 ng/mL   Iron and Transferrin Iron Binding Capacity   Result Value Ref Range    Iron 65 42 - 175 ug/dL    Transferrin 271 212 - 360 mg/dL    Transferrin Saturation, Calculated 19 (L) 20 - 50 %    Transferrin IBC, Calculated 339 313 - 563 ug/dL       ROS:  10 point review of symptoms all negative except as outlined in the HPI above.    Med list and active problem list reviewed and updated as part of this encounter    Current Outpatient Prescriptions on File Prior to Visit   Medication Sig Dispense Refill     BLOOD SUGAR DIAGNOSTIC (TRUETEST TEST STRIPS MISC) Use As Directed. Test 3 times daily       buPROPion (WELLBUTRIN XL) 150 MG 24 hr tablet Take 1 tablet (150 mg total) by mouth daily. 30 tablet 0     cetirizine (ZYRTEC) 10 MG tablet Take 1 tablet (10 mg total) by mouth daily. 90 tablet 4     hydrOXYzine (ATARAX) 50 MG tablet 1-2 at bedtime as needed for sleep and itching 60 tablet 11     ibuprofen (ADVIL,MOTRIN) 800 MG tablet TAKE 1 TABLET BY MOUTH THREE TIMES DAILY WITH FOOD AS NEEDED 90 tablet 2     LANTUS SOLOSTAR 100 unit/mL (3 mL) pen Inject 25 Units under the skin bedtime. 5 adj dose pen 3     miscellaneous medical supply Misc Compression Stockings- custom knee high with zipper 4 each 0     montelukast (SINGULAIR) 10 mg tablet TAKE 1 TABLET BY MOUTH DAILY AS DIRECTED 90 tablet 3     NEXT CHOICE ONE DOSE tablet   5     omeprazole  "(PRILOSEC) 40 MG capsule TAKE 1 CAPSULE BY MOUTH EVERY DAY 30 capsule 11     pen needle, diabetic (BD ULTRA-FINE MILLI PEN NEEDLES) 32 gauge x 5/32\" Ndle USE THREE TIMES DAILY WITH LANCETS AND NOVOLOG 100 each 3     pioglitazone (ACTOS) 30 MG tablet        sertraline (ZOLOFT) 100 MG tablet TAKE 2 TABLETS BY MOUTH DAILY 180 tablet 3     hydrocortisone 2.5 % ointment Apply to irritated skin twice a day as needed 453.6 g 3     lancets (TRUEPLUS LANCETS) 33 gauge Misc Use As Directed. Check BG 3 tmes daily       No current facility-administered medications on file prior to visit.          Giselle Munoz MD    This document was created using voice recognition software which may contain typographical errors.      "

## 2021-06-09 NOTE — PROGRESS NOTES
[facility] IN-OFFICE Visit    Chief Complaint:  Chief Complaint   Patient presents with     Follow Up         Assessment/Plan:  1. Iron deficiency anemia due to chronic blood loss  Pt ran out of her children's vitamin with iron- refilled today.    Lab Results   Component Value Date    HGB 10.7 (L) 07/17/2020     - HM2(CBC w/o Differential)  - Ferritin    2. Diabetic gastroparesis (H)  Improved appetite without vomiting now- just mild nausea.  Continue prn reglan and phenergan.  Continue to work on tight glucose control.  PPI daily - will try to switch to tablet form since pt seems to regurgitate the capsule and doesn' t like the taste of opening the capsule on her food.  Tolerating metformin.      3. Type 2 diabetes mellitus with diabetic neuropathy, with long-term current use of insulin (H)  improved Controlled- works with [facility].  Addressed smoking status and aspirin therapy.  Recommended annual eye exam and dental cares. Reviewed foot cares and foot exam.  Blood pressure and lipid management reviewed today.  Vaccines reviewed and updated.  Plan for glucose management includes ongoing focus on healthy diabetic diet and increased activity, continue meds as prescribed by endocrinology- metformin 1000mg two times a day, Actos? 30mg daily, lantus 20u hs.  Labs ordered as below including:     Lab Results   Component Value Date    HGBA1C 7.9 (H) 07/17/2020   , No results found for: LDL,   Lab Results   Component Value Date    CREATININE 0.64 07/17/2020       - Comprehensive Metabolic Panel  - Glycosylated Hemoglobin A1c  - Microalbumin, Random Urine    4. Class 3 severe obesity due to excess calories with serious comorbidity and body mass index (BMI) of 50.0 to 59.9 in adult (H)  Weight gain noted over the winter due to getting her motorized scooter and also home bound due to covid-19.  Reviewed dietary changes- pt not able to follow due to financial restrictions and limited ability to  "leave home to shop, etc.  Limited activity due to use of scooter due to severe obesity causing djd and mobility issues, etc.  Continue to monitor.  Pt declines referral to bariatric clinic for either general support on weight loss or surgical discussion.  States \"she's fine\".      5. Vitamin D deficiency  Pt has stopped her supplement - check level and replace as needed.  Notes she has been sitting in the sun a lot this summer.   - Vitamin D, Total (25-Hydroxy)    6. Polyneuropathy associated with underlying disease (H)  Ongoing symptoms of RLS along with numbness and pain in her feet.  Doesn't feel steady on her feet and often feels like she is going to fall.  Prefers to use scooter both in her apartment and outside to improve her ability to function and enjoy.      7. Major depressive disorder, recurrent episode, mild (H)  Pt notes she has increased depression.  Does not think she is taking her sertraline or anything for depression.  Has needed 200mg sertraline in past.  Will restart sertraline 50mg daily and recheck in 3 months.        Return in about 3 months (around 10/17/2020) for Clinic visit.  The following are part of a depression follow up plan for the patient:  implementation of measures to provide psychological support  The following high BMI interventions were performed this visit: encouragement to exercise and lifestyle education regarding diet    Patient Education/AVS:  There are no Patient Instructions on file for this visit.    HPI:   Mariola Rdz is a 44 y.o. female c/o being more tired lately.  On covid quarantine and more depressed.  Lives with  and 2 roomates and everyone staying safe.  Wearing mask when out.  Appetite is up and down.  Mobility limited due to heat this summer.  Did get out camping.  More depressed.  Vomiting is better - only some nausea at this point.      Having bladder accidents- will have sudden urge and has to get to the bathroom quickly.  Sometimes will make " it there in time and sometimes can't get the pullup down in time and will leak urine and stool.  Can't leave a urine sample today.      Diabetes- metformin 1000mg two times a day, stopped trulicity due to nausea.  lantus decreased to 20u daily due to low blood sugar.  Glipizide also stopped.  Taking actos 30mg daily. Stopped the atorvastatin.      Bad itching and allergies.  singulair helps.  Hydroxyzine helps at bedtime.  Needs refill of her cetirizine/pseudophed.     Wants 90 day supply of her ibuprofen 800mg to help with pain. Stopped gabapentin.      not vomiting blood.  Taking chewable vit with iron.      Omeprazole capsule hard to swallow- wondering if she can get something else. reglan and phenergan as needed 0-2x/week.      Stopped the vitamin D since not covered by insurance. Trying to get sun.      ROS:  Constitutional, ENT, CV, Resp, GI, , MSK, skin, neuro, psych all negative except as outlined in the HPI above and patient denies any other symptoms.      History summarized from1-2:vascular surgery consult for bilateral swellign 5/23/20 reviewed- related to venous stasis, morbid obesity, lyphedema.  Weight reduction, compression stockings, normal vascular studies noted.    Old Records-1: Outside allergies, meds, problems and immunizations were reconciled as needed  Radiology tests reviewed-1: venous us wnl  Lab tests reviewed-1: [id number]    Health Maintenance reviewed and ordered as appropriate as part of shared decision making with patient.     Social History     Tobacco Use   Smoking Status Never Smoker   Smokeless Tobacco Never Used   Tobacco Comment    family members smoke outside      Social History     Substance and Sexual Activity   Sexual Activity Yes     Partners: Male     Birth control/protection: Condom     Social History     Social History Narrative    Lives with Geoff alexander, and 2 roomates    Son has been adopted out       Physical Exam:  /78 (Patient Site: Right Arm, Patient  Position: Sitting, Cuff Size: Child) Comment (Patient Site): forearm  Pulse (!) 106   Wt (!) 359 lb (162.8 kg)   LMP 06/29/2020   BMI 65.66 kg/m   Body mass index is 65.66 kg/m . Patient's last menstrual period was 06/29/2020.  Vital signs reviewed  Wt Readings from Last 3 Encounters:   07/17/20 (!) 359 lb (162.8 kg)   05/28/20 (!) 351 lb (159.2 kg)   05/19/20 (!) 330 lb (149.7 kg)     10/2019 weight 323    JOSE MARIA 7 Total Score: 6 (10/11/2019 10:00 AM)    PHQ-9 Total Score: 13 (7/17/2020  2:39 PM)    PHQ-2 Total Score: 3 (7/17/2020  2:39 PM)    No data recorded    All normal as below except abnormalities include: pt in motorized scooter.  Not wearing her compression stockings today.  2 edema in both feet/ankles/shins.  Pt refuses to wear a mask for longer than a couple minutes and frequently removes to talk or breath.    General is a  44 y.o. female sitting comfortably in no apparent distress wearing a mask.  HEENT:  Eye exam normal   Neck: Supple without lymphadenopathy or thyromegally  CV: Regular rate and rhythm S1S2 without rubs, murmurs or gallops,   Lungs: Clear to auscultation bilaterally  Abd:  +BS, soft NT/ND,  No masses or organomegally  Extremities: Warm, 2+ Pedal and radial pulses bilaterally  Skin: No lesions or rashes noted    Results for orders placed or performed in visit on 07/17/20   HM2(CBC w/o Differential)   Result Value Ref Range    WBC 9.6 4.0 - 11.0 thou/uL    RBC 4.73 3.80 - 5.40 mill/uL    Hemoglobin 10.7 (L) 12.0 - 16.0 g/dL    Hematocrit 33.4 (L) 35.0 - 47.0 %    MCV 71 (L) 80 - 100 fL    MCH 22.6 (L) 27.0 - 34.0 pg    MCHC 32.0 32.0 - 36.0 g/dL    RDW 16.4 (H) 11.0 - 14.5 %    Platelets 517 (H) 140 - 440 thou/uL    MPV 7.8 7.0 - 10.0 fL   Ferritin   Result Value Ref Range    Ferritin 4 (L) 10 - 130 ng/mL   Comprehensive Metabolic Panel   Result Value Ref Range    Sodium 137 136 - 145 mmol/L    Potassium 4.4 3.5 - 5.0 mmol/L    Chloride 102 98 - 107 mmol/L    CO2 24 22 - 31 mmol/L     "Anion Gap, Calculation 11 5 - 18 mmol/L    Glucose 105 70 - 125 mg/dL    BUN 10 8 - 22 mg/dL    Creatinine 0.64 0.60 - 1.10 mg/dL    GFR MDRD Af Amer >60 >60 mL/min/1.73m2    GFR MDRD Non Af Amer >60 >60 mL/min/1.73m2    Bilirubin, Total 0.8 0.0 - 1.0 mg/dL    Calcium 8.7 8.5 - 10.5 mg/dL    Protein, Total 7.5 6.0 - 8.0 g/dL    Albumin 3.1 (L) 3.5 - 5.0 g/dL    Alkaline Phosphatase 79 45 - 120 U/L    AST 13 0 - 40 U/L    ALT 12 0 - 45 U/L   Glycosylated Hemoglobin A1c   Result Value Ref Range    Hemoglobin A1c 7.9 (H) 3.5 - 6.0 %   Vitamin D, Total (25-Hydroxy)   Result Value Ref Range    Vitamin D, Total (25-Hydroxy) 20.2 (L) 30.0 - 80.0 ng/mL       Med list and active problem list reviewed and updated as part of this encounter    Current Outpatient Medications on File Prior to Visit   Medication Sig Dispense Refill     BD ULTRA-FINE MILLI PEN NEEDLE 32 gauge x 5/32\" Ndle USE THREE TIMES DAILY 300 each 0     blood glucose meter (GLUCOMETER) Dispense meter covered by pt ins.       BLOOD SUGAR DIAGNOSTIC (TRUETEST TEST STRIPS MISC) Use As Directed. Test 3 times daily       buPROPion (WELLBUTRIN XL) 150 MG 24 hr tablet Take 1 tablet (150 mg total) by mouth daily. 90 tablet 1     CONTOUR NEXT TEST STRIPS strips TEST TID  3     hydrOXYzine HCl (ATARAX) 50 MG tablet TAKE 1 TO 2 TABLETS BY MOUTH EVERY 6 HOURS AS NEEDED FOR ITCHING 120 tablet 11     lancets (TRUEPLUS LANCETS) 33 gauge Misc Use As Directed. Check BG 3 tmes daily       metFORMIN (GLUCOPHAGE) 1000 MG tablet TAKE 1 TABLET(1000 MG) BY MOUTH TWICE DAILY WITH MEALS 180 tablet 3     metoclopramide (REGLAN) 5 MG tablet Take 1 tablet (5 mg total) by mouth daily as needed for nausea. 30 tablet 1     miscellaneous medical supply Misc Compression Stockings- custom knee high with zipper 4 each 0     montelukast (SINGULAIR) 10 mg tablet TAKE 1 TABLET(10 MG) BY MOUTH DAILY AT BEDTIME AS DIRECTED 90 tablet 4     NOVOFINE PLUS 32 gauge x 1/6\" Ndle Inject 1 each under the " skin daily. With basaglar insulin       ONETOUCH VERIO FLEX Misc TEST  TID  0     pioglitazone (ACTOS) 30 MG tablet Take 1 tablet by mouth daily  0     polyethylene glycol (PURELAX) 17 gram/dose powder take one capful daily mixed in food or drink       pramipexole (MIRAPEX) 0.5 MG tablet TAKE 1 TABLET(0.5 MG) BY MOUTH TWICE DAILY 180 tablet 1     promethazine (PHENERGAN) 12.5 MG tablet Take 1 tablet (12.5 mg total) by mouth 2 (two) times a day as needed (as needed prior to meals for nausea). 90 tablet 0     No current facility-administered medications on file prior to visit.          Giselle Munoz MD

## 2021-06-10 NOTE — TELEPHONE ENCOUNTER
I am covering for Obdulio today, I called the pharmacy, and they ran her other insurance, and her vitamin D is covered, they will get this ready for her now.

## 2021-06-10 NOTE — TELEPHONE ENCOUNTER
Patient is informed of the lab results, no further questions.   Per pt asked that we send a copy of her lab results to her. Informed the pt that we will mail her a copy.    Results was also faxed to [facility].    Dr. Munoz, per pt said that her insurance plan does not cover for Vitamin D and she is not able to get it OTC either. Please advise. Thanks.

## 2021-06-10 NOTE — TELEPHONE ENCOUNTER
----- Message from Giselle Munoz MD sent at 7/29/2020 12:31 PM CDT -----  Please call patient with following message:   Her iron is still low- she should take her iron pill every day  Her vitaminD is also low.  She should take her vitamin D 1 or 2 days every week.  Her diabetes is doing better  Her kidney and liver tests are healthy.     Please send results to [facility] in [facility]
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
## 2021-06-10 NOTE — PROGRESS NOTES
Clinic Care Coordination Contact:    Reason: [id number] - Ambulatory referral to Care Management (Primary Care)     Intervention/Education provided during outreach: The clinic Community Health Worker talked with the patient today at the request of the PCP to discuss possible Clinic Care Coordination enrollment.  The service was described to the patient and immediate needs were discussed. The patient is informed CCC team includes CHW, RN, SW, and FRW work closely with the pt pcp in the clinic. The patient confirmed no needs and declined enrollement at this time. The PCP is encouraged to refer in the future if the patient's needs change.     Plan:   Route message to update PCP, Dr. Munoz.   Clinic Care Coordination Service: no further action need at this time.

## 2021-06-11 NOTE — PROGRESS NOTES
Relayed message by Dr. Munoz to [facility]'s pharmacy. Both Ropinorole and Atorvastatin has been closed out.

## 2021-06-11 NOTE — TELEPHONE ENCOUNTER
Symptom  Describe your symptoms: The patient states she has incontinent of urine for 4 days. The patient denies pressure, dysuria, fever or other symptoms of a urinary tract infection other than she states she has frequency of urination.   Any pain: no  New/Ongoing: New  How long have you been having symptoms: 4  day(s)  Have you been seen for this:  No  Appointment offered?: Yes, declined   Triage offered?: Yes, declined  Home remedies tried: None   Requested Pharmacy: [facility]  Okay to leave a detailed message? No   The patient hung up mid-call , so writer is unable to ask.

## 2021-06-11 NOTE — TELEPHONE ENCOUNTER
Pt has severe anxiety- please call back and offer to do a phone/video or in person visit or she can go to urgent care

## 2021-06-11 NOTE — PROGRESS NOTES
[facility] Office Visit    Chief Complaint:  Chief Complaint   Patient presents with     Diabetes     follow up, pt has other concerns she will address to PCP         Assessment/Plan:  1. Type 2 diabetes mellitus with complication, with long-term current use of insulin  Addressed smoking status and aspirin therapy.  Recommended annual eye exam and dental cares. Reviewed foot cares and foot exam.  Blood pressure and lipid management reviewed today.  Vaccines reviewed and updated.  Plan for glucose management includes ongoing focus on healthy diabetic diet and increased activity, will switch her metformin to 1000mg tab to ease in swallowing and increase compliance.  Continue actos 30mg daily.  F/u with her endocrine doctor at [facility] later this month as scheduled to talk about starting trulicity.  Happy to take over on diabetes if pt desires.   Labs ordered as below including:     Lab Results   Component Value Date    HGBA1C 8.1 (H) 07/05/2017   , No results found for: LDL,   Lab Results   Component Value Date    CREATININE 0.77 07/05/2017       - Glycosylated Hemoglobin A1c  - metFORMIN (GLUCOPHAGE) 1000 MG tablet; Take 1 tablet (1,000 mg total) by mouth 2 (two) times a day with meals.  Dispense: 60 tablet; Refill: 11  - Vitamin D, Total (25-Hydroxy)  - Basic Metabolic Panel  - Vitamin B12    2. Essential Hypercholesterolemia  Statin had been stopped with her pregnancy last year and I didn't recommend that she start it again.  Seems to have restarted this on her own.  Will call pharmacy to stop this medication.  Readdress at 49yo.      3. Hypomagnesemia  Historic problem- recheck level and may need supplementation.    - Magnesium    4. Iron Deficiency  Contributing to her restless legs.  Recheck labs and replace as needed.    - HM2(CBC w/o Differential)  - Ferritin    5. Mild intellectual disabilities  Stable.  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  Currently has supervised visitations 4hr 2x/week.      6. Mild Recurrent Major Depression  Moderate score on her PhQ-9 today and pt explains that most of her sx are related to stress of not getting her daughter back.  Pt admits to taking just 1 of her sertraline daily due to pill burden in the morning and feels like her mood is doing pretty good.  Will continue to monitor.  Pt declines cognitive tx and this was encouraged to scheduled.    - sertraline (ZOLOFT) 100 MG tablet; Take 1 tablet (100 mg total) by mouth daily.  Dispense: 90 tablet; Refill: 3    7. Morbid obesity with BMI of 45.0-49.9, adult  LSM efforts limited due to her cognitive and physical disability.      8. Restless Legs Syndrome  Long time dx.  Needs to restart iron supplementation.  Encourage regular walking daily before bed.  Wean down on ropinorole and slowly increase mirapex.  Consider gabapentin.  Consider neuro consult next.    - HM2(CBC w/o Differential)  - Ferritin  - pramipexole (MIRAPEX) 0.5 MG tablet; Take 1 tablet (0.5 mg total) by mouth at bedtime.  Dispense: 30 tablet; Refill: 2    9. Vitamin D deficiency  Pt wants to stop supplement to reduce pill burden.  Okay to take this one at night if needed.  Advised to continue.    - Vitamin D, Total (25-Hydroxy)      Return in about 6 weeks (around 8/16/2017) for Recheck.  The following are part of a depression follow up plan for the patient:  implementation of measures to provide psychological support  The following high BMI interventions were performed this visit: encouragement to exercise and lifestyle education regarding diet    Patient Education/AVS:  Patient Instructions   Okay to stop the atorvastatin    Switch metformin to 1000mg twice a day    Talk with your diabetes doctor about trulicity once a week    Week 1:   ropinorole 1 in morning and 2 at bedtime   pramipexole 1 in morning and 1 at bedtime    Week 2:  ropinorole 1 at  bedtime  pramipexole 1 in morning and 2 at bedtime    Week 3  Stop ropinorole  pramipexole 1 in the morning and 3 at bedtime    Week 4  Pramipexole 1 in the morning and 4 at bedtime      HPI:   Mariola Rdz is a 41 y.o. female c/o f/u on her chronic medical problems.      Left great toe- was cleaning a closet and dropped a metal lunch box on her left foot about a month ago.  Got a little swollen and painful for a couple days and then it slowly started to feel better but the toenail has turned black.  Painful at the tip of the toe.      Has a hard time swallowing large pills especially in the daytime when she has a lot of pills at the same time.     Legs are really bothering her a lot at night.  Hard to get comfortable and feel like jelly, like they want to just keep moving.  Also gets knife like stabbing pain.  Okay during the day but really bad at night.  Taking ropinrole 1 in am and 4 at bedtime.      metromobility paperwork    Diabetes- stopped    Still working with CPS and has in-home parenting 4 hrs 2 days/week to prove that they can take care of their 5 month old.  Otherwise lives with dad's sister in [place].      Did meet with pharmacist today but did not bring in her meds and really didn't know what her meds were and exactly what her meds were for.  Refill history reviewed and she has been getting her wellbutrin, cetirizine, vitaminD, ibuprofen, lantus, metformin, singulair, omeprazole, actos, ropinorole, sertraline.  Has not filled her atorvastatin.  hydroxizine has not been filled recently- uses as needed for allergies.      Old Records-1:care everywhere consent obtained and records reviewed.    Radiology tests reviewed-1: na  Lab tests reviewed-1: [id number]  Medicine tests reviewed-1: na    Physical Exam:  /64 (Patient Site: Right Arm, Patient Position: Sitting, Cuff Size: Adult Regular)  Pulse 80  Resp 18  Wt (!) 262 lb (118.8 kg)  LMP 07/03/2017  Breastfeeding? No  BMI 49.5  kg/m2 Body mass index is 49.5 kg/(m^2). Patient's last menstrual period was 07/03/2017.  Vital signs reviewed  Wt Readings from Last 3 Encounters:   07/05/17 (!) 262 lb (118.8 kg)   03/29/17 (!) 246 lb (111.6 kg)   02/15/17 (!) 233 lb (105.7 kg)     History   Smoking Status     Never Smoker   Smokeless Tobacco     Never Used     Comment: family members smoke outside      History   Sexual Activity     Sexual activity: Yes     Partners: Male     Birth control/ protection: None     No Data Recorded  PHQ-9 Total Score: 14 (7/5/2017  5:00 PM)  PHQ-2 Total Score: 4 (7/5/2017  5:00 PM)  Depression Follow-up Plan: mental health care assessment (7/5/2017  5:00 PM)  No Data Recorded    All normal as below except abnormalities include: pt uses a seated walker for ambulation.  She has good eye contact.  Her hair is up in a stylish bun.  Talkative and engaged in history and planning.  Left great toenail withblack discoloration on medial proximal corner c/w previous crush injury.  Mildly tender to touch at tip of left great toe- no swelling, redness, or warmth noted.  No d/c.    General is a  41 y.o. female sitting comfortably in no apparent distress.   Neck: Supple without lymphadenopathy or thyromegally  CV: Regular rate and rhythm S1S2 without rubs, murmurs or gallops,   Lungs: Clear to auscultation bilaterally  Extremities: Warm, No Edema, 2+ Pedal and radial pulses bilaterally  Skin: No lesions or rashes noted  Neuro/MSK: Able to ambulate around the exam room with equal movement, strength and normal coordination of the upper and lower extremeties symmetrically    Results for orders placed or performed in visit on 07/05/17   Glycosylated Hemoglobin A1c   Result Value Ref Range    Hemoglobin A1c 8.1 (H) 3.5 - 6.0 %   Vitamin D, Total (25-Hydroxy)   Result Value Ref Range    Vitamin D, Total (25-Hydroxy) 42.2 30.0 - 80.0 ng/mL   Basic Metabolic Panel   Result Value Ref Range    Sodium 141 136 - 145 mmol/L    Potassium 4.7 3.5 -  5.0 mmol/L    Chloride 109 (H) 98 - 107 mmol/L    CO2 23 22 - 31 mmol/L    Anion Gap, Calculation 9 5 - 18 mmol/L    Glucose 107 70 - 125 mg/dL    Calcium 9.2 8.5 - 10.5 mg/dL    BUN 15 8 - 22 mg/dL    Creatinine 0.77 0.60 - 1.10 mg/dL    GFR MDRD Af Amer >60 >60 mL/min/1.73m2    GFR MDRD Non Af Amer >60 >60 mL/min/1.73m2   HM2(CBC w/o Differential)   Result Value Ref Range    WBC 8.3 4.0 - 11.0 thou/uL    RBC 4.40 3.80 - 5.40 mill/uL    Hemoglobin 12.2 12.0 - 16.0 g/dL    Hematocrit 36.9 35.0 - 47.0 %    MCV 84 80 - 100 fL    MCH 27.8 27.0 - 34.0 pg    MCHC 33.1 32.0 - 36.0 g/dL    RDW 13.1 11.0 - 14.5 %    Platelets 440 140 - 440 thou/uL    MPV 8.2 7.0 - 10.0 fL   Ferritin   Result Value Ref Range    Ferritin 10 10 - 130 ng/mL   Vitamin B12   Result Value Ref Range    Vitamin B-12 539 213 - 816 pg/mL   Magnesium   Result Value Ref Range    Magnesium 1.7 (L) 1.8 - 2.6 mg/dL       ROS:  10 point review of symptoms all negative except as outlined in the HPI above.    Med list and active problem list reviewed and updated as part of this encounter    Current Outpatient Prescriptions on File Prior to Visit   Medication Sig Dispense Refill     BLOOD SUGAR DIAGNOSTIC (TRUETEST TEST STRIPS MISC) Use As Directed. Test 3 times daily       buPROPion (WELLBUTRIN XL) 150 MG 24 hr tablet TAKE 1 TABLET(150 MG) BY MOUTH DAILY 30 tablet 10     cholecalciferol, vitamin D3, 5,000 unit capsule TK 1 C PO D  4     hydrOXYzine (ATARAX) 50 MG tablet 1-2 at bedtime as needed for sleep and itching 60 tablet 11     ibuprofen (ADVIL,MOTRIN) 800 MG tablet TAKE 1 TABLET BY MOUTH THREE TIMES DAILY WITH FOOD AS NEEDED 90 tablet 0     lancets (TRUEPLUS LANCETS) 33 gauge Misc Use As Directed. Check BG 3 tmes daily       LANTUS SOLOSTAR 100 unit/mL (3 mL) pen Inject 25 Units under the skin bedtime. 5 adj dose pen 3     miscellaneous medical supply Misc Compression Stockings- custom knee high with zipper 4 each 0     montelukast (SINGULAIR) 10 mg  "tablet TAKE 1 TABLET BY MOUTH DAILY AS DIRECTED 90 tablet 3     omeprazole (PRILOSEC) 40 MG capsule TAKE 1 CAPSULE BY MOUTH EVERY DAY 30 capsule 11     pen needle, diabetic (BD ULTRA-FINE MILLI PEN NEEDLES) 32 gauge x 5/32\" Ndle USE THREE TIMES DAILY WITH LANCETS AND NOVOLOG 100 each 3     pioglitazone (ACTOS) 30 MG tablet        hydrocortisone 2.5 % ointment Apply to irritated skin twice a day as needed 453.6 g 3     NEXT CHOICE ONE DOSE tablet   5     No current facility-administered medications on file prior to visit.          Giselle Munoz MD    This document was created using voice recognition software which may contain typographical errors.      "

## 2021-06-11 NOTE — PROGRESS NOTES
Medication Therapy Management Initial Visit       ASSESSMENT AND PLAN    1. Medication management  Very poor medication historian. She describes several medications that I cannot identify based on her description. Despite this, her refill history seems to be appropriate. She was advised to bring in all medications to follow up visit, but she declines to come back to see me. She was educated about the indication and use for each medication     2. Type 2 diabetes mellitus with complication, with long-term current use of insulin  Due for A1c today. Anticipate that this will be uncontrolled based on description of home BGs. She may benefit from addition of once weekly agent, such as Trulicity, for A1c reduction and weight loss. Defer further management to [facility].   -Changed metformin  mg - 2 tablets BID to Metformin IR 1,000 mg 1 tablet BID, to reduce pill burden  -recommend recheck urine microalbumin  -Encouraged patient to see diabetes education - patient declines    3. Vitamin D deficiency  -Recommend recheck vitamin D    4. Mild Recurrent Major Depression  Difficult to assess control due to vague history provided. PHQ-9 = 14 (moderate depressive symptoms). Provided education about indication of sertraline.   -Continue bupropion  mg daily  -Will discuss patient's request for sertraline dose reduction with MD as history is not entirely clear    5. Hay Fever  Controlled.   -Continue montelukast 10 mg daily  -Continue cetirizine 10 mg daily  -Continue hydroxyzine as directed. Advised patient to take as needed rather than scheduled.     6. Essential Hypercholesterolemia  No Hx ASCVD. 10-year ASCVD risk = 1% (calculated while on statin, likely underestimating)  -Recommend do not restart atorvastatin     7. Restless Legs Syndrome  Uncontrolled. Little to no improvement with ropinirole  -Recommend additional RLS workup  -Consider change to mirapex for symptomatic  "control    8. Chronic Reflux Esophagitis  Controlled. Continue current therapy    FOLLOW-UP PLAN  Mariola was advised to follow up in 1 month. Patient declines follow up and refuses to bring in pill bottles to follow up visits        SUBJECTIVE AND OBJECTIVE  Mariola Rdz is a 41 y.o. female who was referred by Giselle Munoz MD for [facility] services.  Mariola's chief concern today is initial medication management.      Patient presented for her visit today without any medication.  She is a very poor historian and does not know her medications well.  She hardly spoke throughout the visit, only giving one word answers.  She is confused about the indication of many of her medications.  Previously had skilled nursing services that set up her pillboxes for her, however, she no longer has the services and is independently.    Patient follows with [facility] for endocrinology due to her type 2 diabetes, and plans to follow-up with them again within the next couple of weeks.  States that her blood sugars today are \"up and down\".  Self-reports blood sugars in the 100-200 range depending on the day.  She did not bring in her meter to verify this.  States she is using Lantus 25 units daily, and metformin  mg-2 tablets twice daily.  She would like to cut back on her metformin due to difficulty swallowing the pills as they are large.  Patient endorses eating 2 meals and a snack every single day.  She does not eat breakfast.  Vague history of diarrhea including occasional soda consumption, primarily diet soda but occasional regular soda.  She previously had tried Victoza which caused nausea so she discontinued.    Recently restarted on ropinirole for restless leg syndrome.  States that she experiences restless leg symptoms generally 2 days per week, this is worse at night.  Endorses that occur more frequently when she was off of ropinirole.    Patient is taking omeprazole 40 mg daily for acid reflux.  Had an endoscopy " "in the past.  Finds omeprazole to be assistive.    Patient is very adamant that she wants to cut down on her sertraline dose from 200 mg per day to 100 mg per day.  She believes that she takes sertraline for allergy symptoms rather than anxiety/depression.  Additionally, she was recently restarted on bupropion  mg daily.  Feels that her mood is \"pretty good\".  With ups and downs.    Patient reports taking cetirizine 10 mg daily, montelukast 10 mg daily, and hydroxyzine 25 mg 1-2 tablets every night for allergy symptoms.  States that her allergies are seasonal in nature and related to mold, pollen, and dust.  Feels that these have substantially resolved.  She would like to stop taking her \"very large orange pill for allergies\" because it is too difficult to swallow.  It is uncertain what this medication actually is.    Patient does not have a history of clinical ASCVD.  She took atorvastatin 80 mg daily prior to her pregnancy, but stopped when she was pregnant.  Has difficulty swallowing a large tablets. Unclear if she resumed this after the child was born or not.     Refill hx from pharmacy as follows:     Atorvastatin 80 mg - last filled 10/2016    Cetirizine 10 mg - last filled 5/22    Vitamin D - 6/24    Ibuprofen 800 mg - 6/29    Lantus - 5/22 (60 DS)    Metformin  mg - 6/27    Montelukast 10 mg - 5/15 (90 DS)    Omeprazole 40 mg - 6/18    Pioglitazone 30 mg - 5/7 (90 DS)    Ropinirole - 6/24    Sertraline 100 mg - 5/15 (90 DS)    We reviewed Mariola's medication list with them, discussing reason for use, directions for use, and potential side effects of each medication.  Indication, safety, efficacy, and convenience was assessed for all medications.     Current Outpatient Prescriptions   Medication Sig Dispense Refill     BLOOD SUGAR DIAGNOSTIC (TRUETEST TEST STRIPS MISC) Use As Directed. Test 3 times daily       buPROPion (WELLBUTRIN XL) 150 MG 24 hr tablet TAKE 1 TABLET(150 MG) BY MOUTH DAILY 30 " "tablet 10     cholecalciferol, vitamin D3, 5,000 unit capsule TK 1 C PO D  4     hydrocortisone 2.5 % ointment Apply to irritated skin twice a day as needed 453.6 g 3     hydrOXYzine (ATARAX) 50 MG tablet 1-2 at bedtime as needed for sleep and itching 60 tablet 11     ibuprofen (ADVIL,MOTRIN) 800 MG tablet TAKE 1 TABLET BY MOUTH THREE TIMES DAILY WITH FOOD AS NEEDED 90 tablet 0     lancets (TRUEPLUS LANCETS) 33 gauge Misc Use As Directed. Check BG 3 tmes daily       LANTUS SOLOSTAR 100 unit/mL (3 mL) pen Inject 25 Units under the skin bedtime. 5 adj dose pen 3     metFORMIN (GLUCOPHAGE-XR) 500 MG 24 hr tablet Take 2 tablets (1,000 mg total) by mouth 2 (two) times a day. 120 tablet 5     miscellaneous medical supply Misc Compression Stockings- custom knee high with zipper 4 each 0     montelukast (SINGULAIR) 10 mg tablet TAKE 1 TABLET BY MOUTH DAILY AS DIRECTED 90 tablet 3     NEXT CHOICE ONE DOSE tablet   5     omeprazole (PRILOSEC) 40 MG capsule TAKE 1 CAPSULE BY MOUTH EVERY DAY 30 capsule 11     pen needle, diabetic (BD ULTRA-FINE MILLI PEN NEEDLES) 32 gauge x 5/32\" Ndle USE THREE TIMES DAILY WITH LANCETS AND NOVOLOG 100 each 3     pioglitazone (ACTOS) 30 MG tablet        rOPINIRole (REQUIP) 1 MG tablet TAKE 1 TABLET BY MOUTH EVERY MORNING AND 4 TABLETS AT BEDTIME 150 tablet 5     sertraline (ZOLOFT) 100 MG tablet TAKE 2 TABLETS BY MOUTH DAILY 180 tablet 3     No current facility-administered medications for this visit.        Mariola was provided with a printed AVS, and this care plan was communicated via EMR with her primary care provider, Giselle Munoz MD, and is the authorizing prescriber for this visit.  Direct supervision was available by either the patient's PCP or another available physician when needed.    This note has been dictated using voice recognition software. Any grammatical or context distortions are unintentional and inherent to the software.       Time spent: 40 minutes    Madison Kumar" PharmD  [facility] Pharmacist at [facility]

## 2021-06-12 NOTE — TELEPHONE ENCOUNTER
----- Message from Giselle Munoz MD sent at 10/2/2020  1:42 PM CDT -----  Please call pt on Monday 10/5/20 to see if her bladder is better- if not please have pt see me clinic next week.
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
## 2021-06-12 NOTE — TELEPHONE ENCOUNTER
FYI - Status Update  Who is Calling: Patient  Update: Patient called back again complaining of vaginal pain. Patient declined scheduling and stated the doctor she was scheduled with did not call her at all. Patient was transferred to triage. See triage's note.  Okay to leave a detailed message?:  No

## 2021-06-12 NOTE — TELEPHONE ENCOUNTER
Patient Returning Call  Reason for call:  Return call to Monik  Information relayed to patient:  n/a  Patient has additional questions:  No  If YES, what are your questions/concerns:  n/a  Okay to leave a detailed message?: No    Patient was informed it may be for her video visit she was scheduled for but there aren't any clear notes on why the patient was called. Patient stated she waited in the virtual lobby and no one came on.

## 2021-06-12 NOTE — TELEPHONE ENCOUNTER
"\"I am having vaginal pain.\" Symptoms starting 1 week ago. Patient reporting vaginal itching and swelling. Afebrile. Denies vaginal discharge. Reporting urinary frequency, painful with voiding. Denies back or abdominal pain. Vaginal tenderness that she rates as \"severe.\"     Advised to be seen in person with in 4 hours.  Patient stating she does not have transportation. Declines in person appointment.   Transferred to Central Scheduling.     COVID 19 Nurse Triage Plan/Patient Instructions    Please be aware that novel coronavirus (COVID-19) may be circulating in the community. If you develop symptoms such as fever, cough, or SOB or if you have concerns about the presence of another infection including coronavirus (COVID-19), please contact your health care provider or visit [email or web address].     Disposition/Instructions    In-Person Visit with provider recommended. Reference Visit Selection Guide.    Thank you for taking steps to prevent the spread of this virus.  o Limit your contact with others.  o Wear a simple mask to cover your cough.  o Wash your hands well and often.    Resources    [facility]: About COVID-19: [email or web address]    CDC: What to Do If You're Sick: www.cdc.gov/coronavirus/2019-ncov/about/steps-when-sick.html    CDC: Ending Home Isolation: www.cdc.gov/coronavirus/2019-ncov/hcp/disposition-in-home-patients.html     CDC: Caring for Someone: www.cdc.gov/coronavirus/2019-ncov/if-you-are-sick/care-for-someone.html     [facility]: Interim Guidance for Hospital Discharge to Home: www.health.[facility].[place]/diseases/coronavirus/hcp/hospdischarge.pdf    [facility] clinical trials (COVID-19 research studies): [email or web address]/um-clinical-trials     Below are the COVID-19 hotlines at the [place] Department of Health ([facility]). Interpreters are available.   o For health questions: Call 355-212-0459 or 1-973.512.8324 (7 a.m. to 7 p.m.)  o For questions about schools and childcare: " Call 081-510-1794 or 1-995.871.3013 (7 a.m. to 7 p.m.)           Additional Information    Negative: Patient sounds very sick or weak to the triager    [1] SEVERE pain AND [2] not improved 2 hours after pain medicine    Negative: Sounds like a life-threatening emergency to the triager    Negative: Followed a genital area injury    Negative: Foreign body in vagina (e.g., tampon)    Negative: Vaginal bleeding is main symptom    Negative: Vaginal discharge is main symptom    Negative: Pain or burning with passing urine (urination) is main symptom    Negative: Menstrual cramps is main symptom    Negative: Abdomen pain is main symptom    Negative: Pubic lice suspected    Negative: Itching or rash of external female genital area (vulva)    Protocols used: VAGINAL SYMPTOMS-A-AH

## 2021-06-12 NOTE — TELEPHONE ENCOUNTER
Called patient and she says she is doing a little better but not much and still wants to be checked out. Patient is scheduled of this Thursday at 11:40AM

## 2021-06-12 NOTE — PROGRESS NOTES
[facility] Clinic Office Visit    Chief Complaint:  Chief Complaint   Patient presents with     Follow-up     6 weeks          Assessment/Plan:  1. Vitamin D deficiency  Will switch from daily to weekly vitamin D now that her levels have come up and she is trying to cut down on the number of pills she takes a day.    - ergocalciferol (VITAMIN D2) 50,000 unit capsule; Take 1 capsule (50,000 Units total) by mouth once a week.  Dispense: 12 capsule; Refill: 4    2. Type 2 diabetes mellitus with complication, without long-term current use of insulin  Continue to work with her endocrinologist from [facility].  Continue weekly trulicity, metformin 1000mg bid.  Will see pt back in 3 months and update her flowsheet with labs, etc. As able.  Pt is not on birth control and had a baby this year.  Should be on folic acid daily- pt wants to stop the PNV due to its size and how sick it makes her feel.    - folic acid (FOLVITE) 1 MG tablet; Take 1 tablet (1 mg total) by mouth daily.  Dispense: 90 tablet; Refill: 4    3. Mild Recurrent Major Depression  Stable- not working with any mental health therapist or psychiatrist currently per pt report.  Continue wellbutrin xr 150mg daily in am and sertaline 100mg daily in am.      4. Perennial allergic rhinitis, unspecified allergic rhinitis trigger  Okay to stop the cetirizine per pt preferenc and just use singulair at night.    - montelukast (SINGULAIR) 10 mg tablet; Take 1 tablet (10 mg total) by mouth at bedtime. TAKE 1 TABLET BY MOUTH DAILY AS DIRECTED  Dispense: 90 tablet; Refill: 3    5. Great toe pain, left  Pt has been noting pain and swelling and discoloration in left great toe for 2-3 months now.  My guess is that there has been some traumatic event that started these sx but pt denies anything specific.  Exam is grossly normal except for some black discoloration in a vertical band along the left great toenail. Pt notes swelling but very subtle and hard to detect on  exam.  No redness, etc or other warning signs.  Xray is normal.  Will defer to podiatry to further evaluate since not getting better with conservative tx.    - XR Toe Left 2 or More VWS; Future    Return in about 3 months (around 11/16/2017) for Recheck.  The following are part of a depression follow up plan for the patient:  implementation of measures to provide psychological support  The following high BMI interventions were performed this visit: encouragement to exercise and lifestyle education regarding diet    Patient Education/AVS:  Patient Instructions   Okay to stop  Iron  lantus  cetirizine  Multivitamin- switch to folic acid  Magnesium  Lisinopril  gabapentin    Stop the daily vitamin D and just take this once a week.              HPI:   Mariola Rdz is a 42 y.o. female c/o f/u on her meds.  Hoping to simplify her meds even more. Not taking trazodone and sleeping well on this.  Too many meds during the day time.  At bedtime takes 4 pills- metformin, gabapentin, hydroxyzine prn,   AM meds- sertraline, pramipexole, omeprazole, magnesium, lisinopril, bupropion, vitamin D,     MV is too big and wondering if she can take a smaller pill.      Using condoms for birth control.      Still having pain in left great toe for past several months still feels painful and swollen and still has a black kaylee on toenail.      History summarized from1-2:endocrine visit 7/2017 reviewed from [facility]- keep taking metformin and actos.  Stop lantus and start weekly trulicity.    Old Records-1:care everywhere consent obtained and records reviewed.    Radiology tests reviewed-1: na  Lab tests reviewed-1: 2017  Medicine tests reviewed-1: na    Physical Exam:  /72 (Patient Site: Right Arm, Patient Position: Sitting, Cuff Size: Adult Regular) Comment (Patient Site): forearm  Pulse 76  Resp 16  Wt (!) 264 lb 0.6 oz (119.8 kg)  LMP 07/03/2017 (Approximate)  BMI 49.89 kg/m2 Body mass index is 49.89 kg/(m^2). Patient's  last menstrual period was 07/03/2017 (approximate).  Vital signs reviewed  Wt Readings from Last 3 Encounters:   08/16/17 (!) 264 lb 0.6 oz (119.8 kg)   07/05/17 (!) 262 lb (118.8 kg)   03/29/17 (!) 246 lb (111.6 kg)     History   Smoking Status     Never Smoker   Smokeless Tobacco     Never Used     Comment: family members smoke outside      History   Sexual Activity     Sexual activity: Yes     Partners: Male     Birth control/ protection: None, Condom     No Data Recorded  PHQ-9 Total Score: 14 (7/5/2017  5:00 PM)  PHQ-2 Total Score: 4 (7/5/2017  5:00 PM)  Depression Follow-up Plan: mental health care assessment (7/5/2017  5:00 PM)  No Data Recorded    All normal as below except abnormalities include: left great toe appears normal except a vertical streak of black on the toenail unchanged from 6 weeks ago.  No redness/swelling/warmth appreciated on exam.  Pt notes discomfort and pain with palpation but able to walk comfortably.  Normal xray per my review.  Hair present on left great toe.  <3 sec capillary refill in left great toe.    General is a  42 y.o. female sitting comfortably in no apparent distress.   Neck: Supple without lymphadenopathy or thyromegally  CV: Regular rate and rhythm S1S2 without rubs, murmurs or gallops,   Lungs: Clear to auscultation bilaterally  Extremities: Warm, No Edema, 2+ Pedal and radial pulses bilaterally  Skin: No lesions or rashes noted  Neuro/MSK: Able to ambulate around the exam room with equal movement, strength and normal coordination of the upper and lower extremeties symmetrically    Results for orders placed or performed in visit on 07/05/17   Glycosylated Hemoglobin A1c   Result Value Ref Range    Hemoglobin A1c 8.1 (H) 3.5 - 6.0 %   Vitamin D, Total (25-Hydroxy)   Result Value Ref Range    Vitamin D, Total (25-Hydroxy) 42.2 30.0 - 80.0 ng/mL   Basic Metabolic Panel   Result Value Ref Range    Sodium 141 136 - 145 mmol/L    Potassium 4.7 3.5 - 5.0 mmol/L    Chloride 109  (H) 98 - 107 mmol/L    CO2 23 22 - 31 mmol/L    Anion Gap, Calculation 9 5 - 18 mmol/L    Glucose 107 70 - 125 mg/dL    Calcium 9.2 8.5 - 10.5 mg/dL    BUN 15 8 - 22 mg/dL    Creatinine 0.77 0.60 - 1.10 mg/dL    GFR MDRD Af Amer >60 >60 mL/min/1.73m2    GFR MDRD Non Af Amer >60 >60 mL/min/1.73m2   HM2(CBC w/o Differential)   Result Value Ref Range    WBC 8.3 4.0 - 11.0 thou/uL    RBC 4.40 3.80 - 5.40 mill/uL    Hemoglobin 12.2 12.0 - 16.0 g/dL    Hematocrit 36.9 35.0 - 47.0 %    MCV 84 80 - 100 fL    MCH 27.8 27.0 - 34.0 pg    MCHC 33.1 32.0 - 36.0 g/dL    RDW 13.1 11.0 - 14.5 %    Platelets 440 140 - 440 thou/uL    MPV 8.2 7.0 - 10.0 fL   Ferritin   Result Value Ref Range    Ferritin 10 10 - 130 ng/mL   Vitamin B12   Result Value Ref Range    Vitamin B-12 539 213 - 816 pg/mL   Magnesium   Result Value Ref Range    Magnesium 1.7 (L) 1.8 - 2.6 mg/dL       ROS:  10 point review of symptoms all negative except as outlined in the HPI above.    Med list and active problem list reviewed and updated as part of this encounter    Current Outpatient Prescriptions on File Prior to Visit   Medication Sig Dispense Refill     BLOOD SUGAR DIAGNOSTIC (TRUETEST TEST STRIPS MISC) Use As Directed. Test 3 times daily       buPROPion (WELLBUTRIN XL) 150 MG 24 hr tablet TAKE 1 TABLET(150 MG) BY MOUTH DAILY 30 tablet 10     hydrocortisone 2.5 % ointment Apply to irritated skin twice a day as needed 453.6 g 3     hydrOXYzine (ATARAX) 50 MG tablet 1-2 at bedtime as needed for sleep and itching 60 tablet 11     ibuprofen (ADVIL,MOTRIN) 800 MG tablet TAKE 1 TABLET BY MOUTH THREE TIMES DAILY WITH FOOD AS NEEDED 90 tablet 0     lancets (TRUEPLUS LANCETS) 33 gauge Misc Use As Directed. Check BG 3 tmes daily       metFORMIN (GLUCOPHAGE) 1000 MG tablet Take 1 tablet (1,000 mg total) by mouth 2 (two) times a day with meals. 60 tablet 11     miscellaneous medical supply Misc Compression Stockings- custom knee high with zipper 4 each 0     NEXT  "CHOICE ONE DOSE tablet   5     omeprazole (PRILOSEC) 40 MG capsule TAKE 1 CAPSULE BY MOUTH EVERY DAY 30 capsule 11     pen needle, diabetic (BD ULTRA-FINE MILLI PEN NEEDLES) 32 gauge x 5/32\" Ndle USE THREE TIMES DAILY WITH LANCETS AND NOVOLOG 100 each 3     pramipexole (MIRAPEX) 0.5 MG tablet Take 1 tablet (0.5 mg total) by mouth at bedtime. 30 tablet 2     sertraline (ZOLOFT) 100 MG tablet Take 1 tablet (100 mg total) by mouth daily. 90 tablet 3     No current facility-administered medications on file prior to visit.          Giselle Munoz MD    This document was created using voice recognition software which may contain typographical errors.      "

## 2021-06-12 NOTE — TELEPHONE ENCOUNTER
"Pt calling  Sx started 1 week ago  Pt \" found lump on R side of neck & its draining\"  \"It is about the size of a golf ball\"  Is draining \"reddish pus\"  It has a \"pimple like head on it\"  \"The whole thing is red\"  No red streaks per pt  Is painful \"its hard to sleep\" rates it a 10, pt is taking ibuprofen which is effective for pain relief    Pt has appointment with PCP on Thursday & is requesting a phone call from team today instead of an appointment  Per protocol pt to be seen within 24 hours   Reviewed care advice & when to call back  Brianna Byrd RN  [place] Nurse Advisor      Reason for Disposition    Boil > 2 inches across (> 5 cm; larger than a golf ball or ping pong ball)    Additional Information    Negative: [1] Widespread rash AND [2] bright red, sunburn-like AND [3] too weak to stand    Negative: Sounds like a life-threatening emergency to the triager    Negative: Painful lump or swelling at opening to anus (rectum)    Negative: Painful lump or swelling at opening to vagina (on labia)    Negative: Painful lump or swelling on scrotum    Negative: Impetigo suspected or diagnosed    Negative: Doesn't match the SYMPTOMS of a boil    Negative: MRSA, questions about (No boil or other skin lesion)    Negative: Widespread red rash    Negative: Black (necrotic) color or blisters develop in wound    Negative: Patient sounds very sick or weak to the triager    Negative: SEVERE pain (e.g., excruciating)    Negative: Red streak from area of infection    Negative: Fever > 100.5 F (38.1 C)    Protocols used: BOIL (SKIN ABSCESS)-A-AH    COVID 19 Nurse Triage Plan/Patient Instructions    Please be aware that novel coronavirus (COVID-19) may be circulating in the community. If you develop symptoms such as fever, cough, or SOB or if you have concerns about the presence of another infection including coronavirus (COVID-19), please contact your health care provider or visit [email or web address]. "     Disposition/Instructions    In-Person Visit with provider recommended. Reference Visit Selection Guide.    Thank you for taking steps to prevent the spread of this virus.  o Limit your contact with others.  o Wear a simple mask to cover your cough.  o Wash your hands well and often.    Resources    [facility]: About COVID-19: [email or web address]    CDC: What to Do If You're Sick: www.cdc.gov/coronavirus/2019-ncov/about/steps-when-sick.html    CDC: Ending Home Isolation: www.cdc.gov/coronavirus/2019-ncov/hcp/disposition-in-home-patients.html     CDC: Caring for Someone: www.cdc.gov/coronavirus/2019-ncov/if-you-are-sick/care-for-someone.html     [facility]: Interim Guidance for Hospital Discharge to Home: www.[facility].[place]/diseases/coronavirus/hcp/hospdischarge.pdf    [facility] clinical trials (COVID-19 research studies): clinicalaffairs.[facility]-clinical-trials     Below are the COVID-19 hotlines at the [place] [facility] of [facility] ([facility]). Interpreters are available.   o For health questions: Call 067-105-0538 or 1-788.679.5954 (7 a.m. to 7 p.m.)  o For questions about schools and childcare: Call 067-664-0077 or 1-487.621.9962 (7 a.m. to 7 p.m.)

## 2021-06-12 NOTE — TELEPHONE ENCOUNTER
Called and spoke to patient. Provider and myself had made multiple attempt calls to patient. It was unsuccessful. Relayed that message to patient. She said she already has an appointment scheduled for 3pm with Dr. Cee. Completing task.

## 2021-06-12 NOTE — TELEPHONE ENCOUNTER
Will treat with diflucan pill- sent rx to pharmacy today.     Only one pill and should be covered by insurance

## 2021-06-12 NOTE — TELEPHONE ENCOUNTER
"RN Triage:    Has vaginal pain, itching and burning x several days.  Called yesterday and set up a video visit, but \"no one ever called me yesterday.  I was sitting by the phone all day\".  Interfering with sleep.  Burns when urinating.  Unable to get in to clinic.  Telephone visit scheduled for today.    Debra Duarte RN  [facility] Nurse Advisor  "

## 2021-06-12 NOTE — PATIENT INSTRUCTIONS - HE
For your bladder call your diabetes doctor to see what you should do next to get your sugars lower.     You are peeing a lot because your sugars are too high.     Try antibiotics ciprofloxacin twice a day for 3 days    We will call you next week and if not better will have you come to clinic for a checkup in person.

## 2021-06-12 NOTE — TELEPHONE ENCOUNTER
"Patient calling back stating she is having \"vaginal pain.\" Patient reporting she had video visit scheduled for this morning and was unable to connect with provider.    Patient denies change in symptoms. See triage notes from 935 a.m. today.    Patient does not have transportation to clinic. Requesting Telephone Visit.  Warm transferred patient to Central Scheduling to request virtual visit for today with female provider per patient request.    COVID 19 Nurse Triage Plan/Patient Instructions    Please be aware that novel coronavirus (COVID-19) may be circulating in the community. If you develop symptoms such as fever, cough, or SOB or if you have concerns about the presence of another infection including coronavirus (COVID-19), please contact your health care provider or visit [email or web address].     Disposition/Instructions    Virtual Visit with provider recommended. Reference Visit Selection Guide.    Thank you for taking steps to prevent the spread of this virus.  o Limit your contact with others.  o Wear a simple mask to cover your cough.  o Wash your hands well and often.    Resources    [facility]: About COVID-19: [email or web address]    CDC: What to Do If You're Sick: www.cdc.gov/coronavirus/2019-ncov/about/steps-when-sick.html    CDC: Ending Home Isolation: www.cdc.gov/coronavirus/2019-ncov/hcp/disposition-in-home-patients.html     CDC: Caring for Someone: www.cdc.gov/coronavirus/2019-ncov/if-you-are-sick/care-for-someone.html     [facility]: Interim Guidance for Hospital Discharge to Home: www.health.[facility].[place]/diseases/coronavirus/hcp/hospdischarge.pdf    [facility] clinical trials (COVID-19 research studies): clinicalaffairs.[facility]-clinical-trials     Below are the COVID-19 hotlines at the [facility] ([facility]). Interpreters are available.   o For health questions: Call 369-055-6054 or 1-461.277.1120 (7 a.m. to 7 p.m.)  o For questions about schools and childcare: " Call 259-580-7158 or 1-660.515.9180 (7 a.m. to 7 p.m.)     Reason for Disposition    Requesting regular office appointment    Additional Information    Negative: [1] Caller is not with the adult (patient) AND [2] reporting urgent symptoms    Negative: Lab result questions    Negative: Medication questions    Negative: Caller can't be reached by phone    Negative: Caller has already spoken to PCP or another triager    Negative: RN needs further essential information from caller in order to complete triage    Protocols used: INFORMATION ONLY CALL-A-

## 2021-06-12 NOTE — PROGRESS NOTES
[facility] IN-OFFICE Visit    Chief Complaint:  Chief Complaint   Patient presents with     Follow-up     bladder     Recurrent Skin Infections     chin     Flu Vaccine         Assessment/Plan:  1. Urinary incontinence, unspecified type  Likely multifactorial- possible UTI along with hyperglycemia causing polyuria.  Pt needs to restart her diabetes meds and get her sugars back under control.  She is encouraged to reestablish care with her endocrinologist with [facility] ASAP.  Did complete   - Urinalysis-UC if Indicated  - Culture, Urine    2. Type 2 diabetes mellitus with diabetic neuropathy, with long-term current use of insulin (H)  Uncontrolled due to social reasons and possibly cutting back, forgetting or not taking meds.  Causing polydipsia and polyuria now with incontinence and facial skin infection that seems deep as below.  High risk for serious infection concerns.      3. Abscess of face  Not getting better- now draining but appears to have tunneling.  On right side of chin.  Not appropriate to manage in clinical setting today due to co morbities and examination.  Culture sent today.  Advise pt to present to ED for evaluation- will likely need IVAb and formal I&D in a sterile environment with close follow up.  Has motorized scooter here and uses [employer] mobility to get to appointments.  plnas to go home and call 911 to get to ED.        No follow-ups on file.    Patient Education/AVS:  There are no Patient Instructions on file for this visit.    HPI:   Mariola Rdz is a 45 y.o. female c/o bladder problems and draining sore on right side of chin.      Virtual visit on 10/2/20 for urinary incontinence, frequent urination and hyperglycemia.  Decided to treat empirically for UTI with cipro x 3 days and f/u as needed.  Did have a sore on her chin on this day but didn't mention this to provider.  Decreased urinary sx but still has to rush to bathroom at times and not always able to make  it to the bathroom on time.      Started out with a sore bump that was red and hot a week ago.  Started draining a couple days ago and the redness spread.  No fevers.      ROS:  Constitutional, ENT, CV, Resp, GI, , MSK, skin, neuro, psych all negative except as outlined in the HPI above and patient denies any other symptoms.      Health Maintenance reviewed and ordered as appropriate as part of shared decision making with patient.     Social History     Tobacco Use   Smoking Status Never Smoker   Smokeless Tobacco Never Used   Tobacco Comment    family members smoke outside      Social History     Substance and Sexual Activity   Sexual Activity Yes     Partners: Male     Birth control/protection: Condom     Social History     Social History Narrative    Lives with Geoff alexander, and 2 roomates    Son has been adopted out       Physical Exam:  /80 (Patient Site: Left Arm, Patient Position: Sitting, Cuff Size: Adult Regular)   Pulse (!) 101   Temp 97  F (36.1  C) (Tympanic)   Wt (!) 342 lb (155.1 kg)   LMP 09/28/2020 (Exact Date)   BMI 62.55 kg/m   Body mass index is 62.55 kg/m . Patient's last menstrual period was 09/28/2020 (exact date).  Vital signs reviewed  Wt Readings from Last 3 Encounters:   10/08/20 (!) 342 lb (155.1 kg)   10/08/20 (!) 342 lb (155.1 kg)   07/17/20 (!) 359 lb (162.8 kg)     JOSE MARIA 7 Total Score: 6 (10/11/2019 10:00 AM)    PHQ-9 Total Score: 13 (7/17/2020  2:39 PM)    PHQ-2 Total Score: 3 (7/17/2020  2:39 PM)    No data recorded    All normal as below except abnormalities include: 3cm erythematous swelling on right chin with central pus collection and small amount of drainage of thick yellow fluid.  Swab of this area very tender- able to probe 5mm in to center concerning for tunneling towards jaw line.  Mild tachycardia noted.  HTN noted.  Uses a motorized wheelchair for mobility.   General is a  45 y.o. female sitting comfortably in no apparent distress wearing a mask.  HEENT:  Eye  "exam normal   Neck: Supple without lymphadenopathy or thyromegally  CV: Regular rate and rhythm S1S2 without rubs, murmurs or gallops,   Lungs: Clear to auscultation bilaterally  Abd:  +BS, soft NT/ND,  No masses or organomegally  Extremities: Warm, No Edema, 2+ Pedal and radial pulses bilaterally  Skin: No lesions or rashes noted  Neuro/MSK: Able to ambulate around the exam room with equal movement, strength and normal coordination of the upper and lower extremeties symmetrically    Results for orders placed or performed in visit on 10/08/20   Urinalysis-UC if Indicated   Result Value Ref Range    Color, UA Yellow Colorless, Yellow, Straw, Light Yellow    Clarity, UA Clear Clear    Glucose, UA >=1000 mg/dL (!!) Negative    Bilirubin, UA Negative Negative    Ketones, UA >=160 mg/dL (!!) Negative    Specific Gravity, UA 1.015 1.005 - 1.030    Blood, UA Small (!) Negative    pH, UA 7.0 5.0 - 8.0    Protein, UA Negative Negative mg/dL    Urobilinogen, UA 0.2 E.U./dL 0.2 E.U./dL, 1.0 E.U./dL    Nitrite, UA Negative Negative    Leukocytes, UA Small (!) Negative    Bacteria, UA Many (!) None Seen hpf    RBC, UA 0-2 None Seen, 0-2 hpf    WBC, UA 0-5 None Seen, 0-5 hpf    Squam Epithel, UA 5-10 (!) None Seen, 0-5 lpf    Yeast, UA Few (!) None Seen hpf       Med list and active problem list reviewed and updated as part of this encounter    No current facility-administered medications on file prior to visit.      Current Outpatient Medications on File Prior to Visit   Medication Sig Dispense Refill     BD ULTRA-FINE MILLI PEN NEEDLE 32 gauge x 5/32\" Ndle USE THREE TIMES DAILY 300 each 0     blood glucose meter (GLUCOMETER) Dispense meter covered by pt ins.       BLOOD SUGAR DIAGNOSTIC (TRUETEST TEST STRIPS MISC) Use As Directed. Test 3 times daily       buPROPion (WELLBUTRIN XL) 150 MG 24 hr tablet Take 1 tablet (150 mg total) by mouth daily. 90 tablet 1     cetirizine-pseudoephedrine (ZYRTEC-D) 5-120 mg per tablet Take 1 " "tablet by mouth 2 (two) times a day. 60 tablet 5     RONAN/IRON chewable tablet Chew 1 tablet daily. 100 tablet 4     CONTOUR NEXT TEST STRIPS strips TEST TID  3     ergocalciferol (VITAMIN D2) 1,250 mcg (50,000 unit) capsule Take 1 capsule (50,000 Units total) by mouth 2 (two) times a week. 24 capsule 4     hydrOXYzine HCl (ATARAX) 50 MG tablet TAKE 1 TO 2 TABLETS BY MOUTH EVERY 6 HOURS AS NEEDED FOR ITCHING 120 tablet 11     ibuprofen (ADVIL,MOTRIN) 800 MG tablet Take 1 tablet (800 mg total) by mouth daily. 90 tablet 3     insulin glargine (LANTUS SOLOSTAR PEN) 100 unit/mL (3 mL) pen Inject 20 Units under the skin at bedtime.       lancets (TRUEPLUS LANCETS) 33 gauge Misc Use As Directed. Check BG 3 tmes daily       metFORMIN (GLUCOPHAGE) 1000 MG tablet TAKE 1 TABLET(1000 MG) BY MOUTH TWICE DAILY WITH MEALS 180 tablet 3     metoclopramide (REGLAN) 5 MG tablet Take 1 tablet (5 mg total) by mouth daily as needed for nausea. 30 tablet 1     miscellaneous medical supply Misc Compression Stockings- custom knee high with zipper 4 each 0     montelukast (SINGULAIR) 10 mg tablet TAKE 1 TABLET(10 MG) BY MOUTH DAILY AT BEDTIME AS DIRECTED 90 tablet 4     NOVOFINE PLUS 32 gauge x 1/6\" Ndle Inject 1 each under the skin daily. With basaglar insulin       ONETOUCH VERIO FLEX Misc TEST  TID  0     pantoprazole (PROTONIX) 40 MG tablet Take 1 tablet (40 mg total) by mouth daily. 90 tablet 4     pioglitazone (ACTOS) 30 MG tablet Take 1 tablet by mouth daily  0     polyethylene glycol (PURELAX) 17 gram/dose powder take one capful daily mixed in food or drink       pramipexole (MIRAPEX) 0.5 MG tablet TAKE 1 TABLET(0.5 MG) BY MOUTH TWICE DAILY 180 tablet 1     promethazine (PHENERGAN) 12.5 MG tablet Take 1 tablet (12.5 mg total) by mouth 2 (two) times a day as needed (as needed prior to meals for nausea). 90 tablet 0     sertraline (ZOLOFT) 50 MG tablet Take 1 tablet (50 mg total) by mouth daily. 90 tablet 1         Giselle Munoz, " MD

## 2021-06-12 NOTE — TELEPHONE ENCOUNTER
Called and inform patient Dr. Munoz is not in clinic until 10/29/2020. Patient is okay with waiting.

## 2021-06-12 NOTE — TELEPHONE ENCOUNTER
Patient says she has an upcoming appointment tomorrow with Giselle Munoz and wonders if she needs to keep it.  Patient says she was seen in the ED recently and had the abscess drained and was given antibiotic.  At the end of the discussion, patient decided she will keep the appointment so a provider can take a look at abscess and see if it is healing as it should.      Additional Information    Negative: RN needs further essential information from caller in order to complete triage    Negative: Requesting regular office appointment    Negative: [1] Caller requesting NON-URGENT health information AND [2] PCP's office is the best resource    Health Information question, no triage required and triager able to answer question    Protocols used: INFORMATION ONLY CALL-A-

## 2021-06-12 NOTE — TELEPHONE ENCOUNTER
Medication Question or Clarification  Who is calling: Patient  What medication are you calling about (include dose and sig)?:   Disp Refills Start End GILL    miconazole (MICOTIN) 2 % cream 28 g 0 10/22/2020  --   Sig: Inset one applicatorful every night for 7 nights.   Sent to pharmacy as: miconazole nitrate 2 % topical cream (MICOTIN)   E-Prescribing Status: Receipt confirmed by pharmacy (10/22/2020 11:59 AM CDT)     Who prescribed the medication?: Giselle Munoz MD   What is your question/concern?: Patient stated it will cost her $15 for this Rx and cannot afford it. Patient stated she would like to know what Giselle Munoz MD wants her to do if she can't get this Rx.  [facility]ed [facility]acy: [facility]  Okay to leave a detailed message?: No

## 2021-06-12 NOTE — TELEPHONE ENCOUNTER
She should talk with her pharmacist- should be able to get something similar OTC for less money than this.

## 2021-06-13 NOTE — PROGRESS NOTES
DIAGNOSIS: Contusion of the left great toe hurts 2 months after injury    PLAN: X-rays are unremarkable.  We will try postoperative shoe and see if that improves her comfort any.  If pain persists another month, we could consider an MRI.  I do not think it is going to change the treatment.  Healing is most likely delayed by diabetes.  Fifteen minute visit, greater than half our time spent counseling and coordinating medical care.     SUBJECTIVE: The patient presents to the [facility] with a 2 month history of left great toe pain.  She dropped something on the toe.  Blackening of most of the toenail noted.  X-rays through primary care have been read as negative.  She is frustrated by the ongoing pain.  She walks with a walker.  She is diabetic.  She denies open wound.  Changing shoes does not make much difference.  She has diabetic shoes now.    PHYSICAL EXAM:  /74 (Patient Site: Right Arm, Patient Position: Sitting, Cuff Size: Adult Large)  Pulse (!) 114  Resp 20  Wt (!) 264 lb (119.7 kg)  SpO2 96%  BMI 49.88 kg/m2  General: Pleasant 42 y.o. female in no acute distress.  Vascular: DP pulses are diminished. PT pulses are diminished. Pedal hair is diminished. Feet are warm to the touch.  Cardiac: Pulse is regular.  Lymphatic: No edema at the ankles.  Neuro: Sensation in the feet is grossly intact to light touch.  Derm: Subungual hematoma involving about 70% of the left great toenail.  Nail is still well adhered to the nail bed.  No open lesions elsewhere.  Musculoskeletal: Pain out of portion with any light touch around the great toe.  Active range of motion is limited.

## 2021-06-15 NOTE — TELEPHONE ENCOUNTER
RN cannot approve Refill Request    RN can NOT refill this medication med is not covered by policy/route to provider. Last office visit: 10/8/2020 Giselle Munoz MD Last Physical: 10/10/2019 Last MTM visit: Visit date not found Last visit same specialty: 10/22/20 Carlo PRATER.  Next visit within 3 mo: Visit date not found  Next physical within 3 mo: Visit date not found      Tammie F Severson, [facility] Connection Triage/Med Refill 2/11/2021    Requested Prescriptions   Pending Prescriptions Disp Refills     ergocalciferol (VITAMIN D2) 1,250 mcg (50,000 unit) capsule 24 capsule 4     Sig: Take 1 capsule (50,000 Units total) by mouth 2 (two) times a week.       There is no refill protocol information for this order

## 2021-06-15 NOTE — TELEPHONE ENCOUNTER
Refill Approved    Rx renewed per Medication Renewal Policy. Medication was last renewed on 2/8/20.    Tom Boogie, [facility] Connection Triage/Med Refill 2/12/2021     Requested Prescriptions   Pending Prescriptions Disp Refills     buPROPion (WELLBUTRIN XL) 150 MG 24 hr tablet 90 tablet 1     Sig: Take 1 tablet (150 mg total) by mouth daily.       Tricyclics/Misc Antidepressant/Antianxiety Meds Refill Protocol Passed - 2/11/2021 11:36 AM        Passed - PCP or prescribing provider visit in last year     Last office visit with prescriber/PCP: 10/8/2020 Giselle Munoz MD OR same dept: 10/8/2020 Giselle Munoz MD OR same specialty: 10/8/2020 Giselle Munoz MD  Last physical: 10/10/2019 Last MTM visit: Visit date not found   Next visit within 3 mo: Visit date not found  Next physical within 3 mo: Visit date not found  Prescriber OR PCP: Giselle Munoz MD  Last diagnosis associated with med order: 1. Mild Recurrent Major Depression  - buPROPion (WELLBUTRIN XL) 150 MG 24 hr tablet; Take 1 tablet (150 mg total) by mouth daily.  Dispense: 90 tablet; Refill: 1    If protocol passes may refill for 12 months if within 3 months of last provider visit (or a total of 15 months).

## 2021-06-15 NOTE — TELEPHONE ENCOUNTER
RN cannot approve Refill Request    RN can NOT refill this medication med is not covered by policy/route to provider. Last office visit: 10/8/2020 Giselle Munoz MD Last Physical: 10/10/2019 Last MTM visit: Visit date not found Last visit same specialty: 10/8/2020 Giselle Munoz MD.  Next visit within 3 mo: Visit date not found  Next physical within 3 mo: Visit date not found      Corina Freeman, [facility] Connection Triage/Med Refill 2/11/2021    Requested Prescriptions   Pending Prescriptions Disp Refills     ergocalciferol (ERGOCALCIFEROL) 1,250 mcg (50,000 unit) capsule [Pharmacy Med Name: VITAMIN D2 50,000IU (ERGO) CAP RX] 26 capsule 0     Sig: TAKE 1 CAPSULE BY MOUTH 2 TIMES A WEEK       There is no refill protocol information for this order

## 2021-06-15 NOTE — PROGRESS NOTES
[facility] Office Visit    Chief Complaint:  Chief Complaint   Patient presents with     Follow-up     diabetes         Assessment/Plan:  1. Type 2 diabetes mellitus with complication, without long-term current use of insulin  Addressed smoking status and aspirin therapy.  Recommended annual eye exam and dental cares. Reviewed foot cares and foot exam.  Blood pressure and lipid management reviewed today.  Vaccines reviewed and updated.  Plan for glucose management includes ongoing focus on healthy diabetic diet and increased activity.  Diabetes managed by [facility].  Continue  Metformin 1000mg bid, trulicity 1.5mg weekly, actos 30mg daily,  glargine 40mg daily.  Labs ordered as below including:     Lab Results   Component Value Date    HGBA1C 8.1 (H) 07/05/2017   , No results found for: LDL,   Lab Results   Component Value Date    CREATININE 0.77 07/05/2017       - Microalbumin, Random Urine; Future  - LDL Cholesterol, Direct    2. Essential Hypercholesterolemia  Continue atorvastatin 40mg daily- warned about avoiding pregnancy while on this medication.    - LDL Cholesterol, Direct    3. Morbid obesity with BMI of 45.0-49.9, adult  Reviewed LSM- pt is not open to bariatric cares.  Open to mental health support.  wellbutrin and SSRI, trulicity, metformin    4. Restless Legs Syndrome  Stable and well controlled with current regimen- continue SSRI, mirapex 0.5mg hs.      5. Diabetic retinopathy  Reviewed need for regular eye exams- pt declined help with referral.  Reviewed need for better glucose control.      6. Mild Recurrent Major Depression  Pt did meet psychologist today briefly and will make sure at her next visit with PMD she will also spend some time with psychologist.  Needs to build trust before she will spend time alone with someone in a room with closed door.    - Ambulatory referral to Psychology    7. Chronic Reflux Esophagitis  Stable on current regimen- reglan 5mg tid, limit  ibuprofen, PPI 40mg daily.    - metoclopramide (REGLAN) 5 MG tablet; Take 1 tablet (5 mg total) by mouth daily as needed for nausea.  Dispense: 30 tablet; Refill: 1    8. Venous stasis  Stable- continue compression stockings. Renew annually.      9. Rash, skin  Stable with use of daily moisturizing and topical steroids as needed.  Avoid picking at skin.      10. Hypomagnesemia  Check level and replace as needed.    - Magnesium    Return in about 4 months (around 5/31/2018).  The following are part of a depression follow up plan for the patient:  implementation of measures to provide psychological support  The following high BMI interventions were performed this visit: encouragement to exercise and lifestyle education regarding diet    Patient Education/AVS:  There are no Patient Instructions on file for this visit.    HPI:   Mariola Rdz is a 42 y.o. female c/o worsening acid reflux symptoms.  Pt notes that over the past week she has increased nausea and vomiting up yellow liquid and small flecks of blood.  Has thrown up 3-4 x in past 2 weeks.  Has had this before but not for a long time-years.  Has been having some heartburn symptoms.  No one else at her home sick.  Taking her ppi daily omeprazole 40mg daily but doesn't seem to be working.  Requests something else that is in the form of a small pill.  Has a hard time swallowing capsules and larger pills.  Does manage her metformin.  Getting hiccups and nasty burps and chest pressure.  Ibuprofen 800mg as needed for pain- not on daily basis.  Getting nausea every other day or 2-3x/week.      Mental health is up and down.  Normal and no worse than usual.  Doesn't currently have a therapist and would be open to meeting one of the staff here.      Taking mirapex at bedtime and this is helping with her legs.      Needs a new pair of compression stockings and not sure when she can get some new ones. Looks like she had her last pair 5/2017.    Got a new walker in  "past year- possibly 9/2017 from [employer]. The wheels aren't working well.     Constantly itching during day- able to sleep at night.  Pt doesn't want anything topical.      Taking vitamin D weekly.  Not taking folic acid.  Using withdrawal for birth control and educated about how ineffective this is.      Asthma has been okay.  Taking singulair every night.  This helps with itching.      Not exercising much in winter so sugars are high.  Did start her insulin shot as recommended and has f/u with endocrine in April.  Gets vision checks annually at [facility] and declines going to [facility] b/c she can't get her glasses there.     ROS:  Constitutional, CV, Resp, GI, , MSK, skin, neuro, psych all negative except as outlined in the HPI above.    History summarized from1-2:Endocrine at [facility] 1/26/18 reviewed: pt had been on metformin, actos and trulicity.  Based on her high sugars she was restarted on basaglar 40u daily.  Fu in 3 months.    old Records-1:[facility] Everywhere consent signed and records obtained  Radiology tests reviewed-1: na  Lab tests reviewed-1: 2018 at [facility].  A1c 9.2  Medicine tests reviewed-1: na    Physical Exam:  Pulse 88  Resp 20  Ht 5' 1\" (1.549 m)  Wt (!) 290 lb (131.5 kg)  LMP 01/27/2018 (Approximate)  BMI 54.8 kg/m2 Body mass index is 54.8 kg/(m^2). Patient's last menstrual period was 01/27/2018 (approximate).  Vital signs reviewed  Wt Readings from Last 3 Encounters:   01/31/18 (!) 290 lb (131.5 kg)   09/20/17 (!) 264 lb (119.7 kg)   08/16/17 (!) 264 lb 0.6 oz (119.8 kg)     History   Smoking Status     Never Smoker   Smokeless Tobacco     Never Used     Comment: family members smoke outside      History   Sexual Activity     Sexual activity: Yes     Partners: Male     Birth control/ protection: None, Condom     No Data Recorded  PHQ-9 Total Score: 14 (7/5/2017  5:00 PM)  PHQ-2 Total Score: 4 (7/5/2017  5:00 PM)  Depression Follow-up Plan: mental health care assessment " (7/5/2017  5:00 PM)  No Data Recorded    All normal as below except abnormalities include: Pt appears well and at her baseline.  Uses a seated walker for ambulation.  Pt is alert and interactive today.  Wearing her zipper compression stockings.  Has chronic small open sores on her legs from picking/scratching her skin.    General is a  42 y.o. female sitting comfortably in no apparent distress.   Neck: Supple without lymphadenopathy or thyromegally  CV: Regular rate and rhythm S1S2 without rubs, murmurs or gallops,   Lungs: Clear to auscultation bilaterally  Extremities: Warm, No Edema, 2+ Pedal and radial pulses bilaterally  Skin: No lesions or rashes noted  Neuro/MSK: Able to ambulate around the exam room with equal movement, strength and normal coordination of the upper and lower extremeties symmetrically    Results for orders placed or performed in visit on 01/31/18   Magnesium   Result Value Ref Range    Magnesium 1.4 (L) 1.8 - 2.6 mg/dL   LDL Cholesterol, Direct   Result Value Ref Range    Direct  <=129 mg/dl       Med list and active problem list reviewed and updated as part of this encounter    Current Outpatient Prescriptions on File Prior to Visit   Medication Sig Dispense Refill     BLOOD SUGAR DIAGNOSTIC (TRUETEST TEST STRIPS MISC) Use As Directed. Test 3 times daily       buPROPion (WELLBUTRIN XL) 150 MG 24 hr tablet TAKE 1 TABLET(150 MG) BY MOUTH DAILY 30 tablet 10     ergocalciferol (VITAMIN D2) 50,000 unit capsule Take 1 capsule (50,000 Units total) by mouth once a week. 12 capsule 4     folic acid (FOLVITE) 1 MG tablet Take 1 tablet (1 mg total) by mouth daily. 90 tablet 4     hydrOXYzine (ATARAX) 50 MG tablet 1-2 at bedtime as needed for sleep and itching 60 tablet 11     lancets (TRUEPLUS LANCETS) 33 gauge Misc Use As Directed. Check BG 3 tmes daily       metFORMIN (GLUCOPHAGE) 1000 MG tablet Take 1 tablet (1,000 mg total) by mouth 2 (two) times a day with meals. 60 tablet 11      miscellaneous medical supply Misc Compression Stockings- custom knee high with zipper 4 each 0     montelukast (SINGULAIR) 10 mg tablet Take 1 tablet (10 mg total) by mouth at bedtime. TAKE 1 TABLET BY MOUTH DAILY AS DIRECTED 90 tablet 3     NEXT CHOICE ONE DOSE tablet   5     pramipexole (MIRAPEX) 0.5 MG tablet Take 1 tablet (0.5 mg total) by mouth at bedtime. 90 tablet 3     sertraline (ZOLOFT) 100 MG tablet Take 1 tablet (100 mg total) by mouth daily. 90 tablet 3     TRULICITY 1.5 mg/0.5 mL PnIj INJECT 1.5 MG SUBCUTANEOUS ONCE A WEEK  1     No current facility-administered medications on file prior to visit.          Giselle Munoz MD

## 2021-06-16 PROBLEM — E66.01 MORBID OBESITY WITH BMI OF 60.0-69.9, ADULT (H): Status: ACTIVE | Noted: 2020-05-28

## 2021-06-16 PROBLEM — S82.832G: Status: ACTIVE | Noted: 2018-12-07

## 2021-06-16 PROBLEM — N05.8 NEPHRITIS AND NEPHROPATHY, NOT SPECIFIED AS ACUTE OR CHRONIC, WITH OTHER SPECIFIED PATHOLOGICAL LESION IN KIDNEY, IN DISEASES CLASSIFIED ELSEWHERE: Status: ACTIVE | Noted: 2019-10-02

## 2021-06-16 PROBLEM — M79.605 LEG PAIN, BILATERAL: Status: ACTIVE | Noted: 2020-05-28

## 2021-06-16 PROBLEM — G63 POLYNEUROPATHY ASSOCIATED WITH UNDERLYING DISEASE (H): Status: ACTIVE | Noted: 2020-07-17

## 2021-06-16 PROBLEM — I89.0 ACQUIRED LYMPHEDEMA OF LEG: Status: ACTIVE | Noted: 2020-05-28

## 2021-06-16 PROBLEM — I87.2 VENOUS INSUFFICIENCY OF BOTH LOWER EXTREMITIES: Status: ACTIVE | Noted: 2020-05-28

## 2021-06-16 PROBLEM — I87.303 VENOUS HYPERTENSION OF LOWER EXTREMITY, BILATERAL: Status: ACTIVE | Noted: 2020-05-28

## 2021-06-16 PROBLEM — I89.0 LYMPHEDEMA OF BOTH LOWER EXTREMITIES: Status: ACTIVE | Noted: 2019-05-01

## 2021-06-16 PROBLEM — M79.604 LEG PAIN, BILATERAL: Status: ACTIVE | Noted: 2020-05-28

## 2021-06-16 PROBLEM — T74.11XA SPOUSAL ABUSE: Status: ACTIVE | Noted: 2020-08-06

## 2021-06-16 PROBLEM — L29.9 ITCHING: Status: ACTIVE | Noted: 2019-03-21

## 2021-06-16 PROBLEM — K92.0 HEMATEMESIS: Status: ACTIVE | Noted: 2019-04-08

## 2021-06-16 PROBLEM — R23.4 SCAB: Status: ACTIVE | Noted: 2021-05-06

## 2021-06-16 PROBLEM — D50.9 MICROCYTIC ANEMIA: Chronic | Status: ACTIVE | Noted: 2019-03-22

## 2021-06-16 PROBLEM — F81.9 LEARNING DISABILITY: Status: ACTIVE | Noted: 2019-05-28

## 2021-06-16 PROBLEM — E11.42 DIABETIC PERIPHERAL NEUROPATHY ASSOCIATED WITH TYPE 2 DIABETES MELLITUS (H): Status: ACTIVE | Noted: 2020-05-28

## 2021-06-16 NOTE — TELEPHONE ENCOUNTER
Pt called back she doesn't want a VV she wants to come in on the 6th for her appt and would just like a call back telling her what she can do prior to that .she also would like to get the covid vaccine

## 2021-06-16 NOTE — TELEPHONE ENCOUNTER
[facility] #[phone]. Postponing task to tomorrow to try again. If patient returns call back, please help patient schedule an appointment per message below. Thanks!

## 2021-06-16 NOTE — TELEPHONE ENCOUNTER
Reason for call:  Patient reporting a symptom  Symptom or request: hard heavy cough x 2 weeks ,vomitting x 2 days    Duration (how long have symptoms been present): 2 weeks   Have you been treated for this before? Yes    Additional comments: pt states this hard ,heavy cough has been going on for two years now I do have her scheduled to see  05/06 . Is there something she can do prior to her appt.? She is also interested in getting the Covid vaccine if she is able to with this cough.   Phone Number patient can be reached at:  Home number on file [phone] (home)    Best Time:  anytime    Can we leave a detailed message on this number: Yes    Call taken on 4/14/2021 at 1:55 PM by Aditi Howard

## 2021-06-16 NOTE — TELEPHONE ENCOUNTER
Telephone Encounter by Yuko Hobbs CMA at 6/27/2019 11:34 AM     Author: Yuko Hobbs CMA Service: -- Author Type: Certified Medical Assistant    Filed: 6/27/2019 11:35 AM Encounter Date: 6/26/2019 Status: Signed    : Yuko Hobbs CMA (Certified Medical Assistant)       Patient Returning Call  Reason for call:  Patient returning call  Information relayed to patient:  Message below from Giselle Munoz MD regarding normal CT results and plan for further testing and referral that was made relayed to the patient.  Patient has additional questions:  No  If YES, what are your questions/concerns:  N/a  Okay to leave a detailed message?: No call back needed     Giselle Munoz MD         6/26/19 12:25 PM   Note      Please let pt know that the chest CT she had this week is healthy and normal.       I do not see anything wrong with her lungs to cause bleeding or blood in her vomit or to make her cough.      I would like her to get the capsule study done and if that is normal then we can have her see a throat doctor next.      I put a referral in for a throat doctor if she wants to get this scheduled.

## 2021-06-16 NOTE — TELEPHONE ENCOUNTER
Telephone Encounter by Soumya Siddiqui at 4/17/2019  3:09 PM     Author: Soumya Siddiqui Service: -- Author Type: --    Filed: 4/17/2019  3:10 PM Encounter Date: 4/17/2019 Status: Signed    : Soumya Siddiqui APPROVED:    Approval start date: 1/17/19  Approval end date: 4/16/2020    Pharmacy has been notified of approval and will contact patient when medication is ready for pickup.

## 2021-06-17 NOTE — TELEPHONE ENCOUNTER
Received MTM referral from provider     Patient was not reachable after several attempts, will route to MTM Pharmacist/Provider as an FYI. Left MTM scheduling information on patients voicemail.    Thank you for the referral,    Brooklyn Leblanc, MTM Coordinator

## 2021-06-17 NOTE — TELEPHONE ENCOUNTER
Telephone Encounter by Nelida Viera LPN at 10/7/2020  3:27 PM     Author: Nelida Viera LPN Service: -- Author Type: Licensed Nurse    Filed: 10/7/2020  3:28 PM Encounter Date: 10/6/2020 Status: Signed    : Nelida Viera LPN (Licensed Nurse)       Patient Returning Call  Reason for call:  Patient returning call   Information relayed to patient:  Giselle Munoz MD  to Giselle Munoz Care Team [place]        10/6/20 7:48 PM  Note     Please have provider do a virtual visit with patient by phone tomorrow or see tomorrow or go to ED      Patient has additional questions:  No  If YES, what are your questions/concerns:  Patient states she scheduled appointment tomorrow with PCP .  Okay to leave a detailed message?: No

## 2021-06-17 NOTE — TELEPHONE ENCOUNTER
Results faxed to Dr. Lyon at fax number [phone]    ----- Message from Giselle Munoz MD sent at 5/19/2021 12:04 PM CDT -----  Please call patient with following message:   Please fax a copy of her result letter today to endocrine at [facility] Dr Lyon

## 2021-06-17 NOTE — PROGRESS NOTES
[facility] IN-OFFICE Visit  Phone : none    Chief Complaint:  Chief Complaint   Patient presents with     Cough     ongoing for years- constan- hard cough that makes her want to vomit-       Assessment/Plan:  1. Cough  Chronic and reoccurrence likely related to her reflux.  Check CXR and TB today as these have not been checked in a while.  Has a component of allergies and asthma.  Has done pulmonary and GI consultation for this already- no major changes just increased lately (off PPI)  - RDX-Niaqntwggdy-TH Gold Plus  - XR Chest 2 Views  - Ambulatory referral to Medication Management    2. Vitamin D deficiency  Check level- pt requesting to stop this med.  Reviewed recommendations to continue to take this 2x/week.  Check level for adherence to meds.    - Vitamin D, Total (25-Hydroxy)    3. Type 2 diabetes mellitus with diabetic neuropathy, with long-term current use of insulin (H)  un Controlled.  Addressed smoking status and aspirin therapy.  Recommended annual eye exam and dental cares. Reviewed foot cares and foot exam.  Blood pressure and lipid management reviewed today.  Vaccines reviewed and updated.  Plan for glucose management includes ongoing focus on healthy diabetic diet and increased activity, pt works with [facility].  Adherence concerns. tresiba 50u daily, metformin 1000mg two times a day, consider invokana next.  MTM appreciated to help coordinate with her endocrinology team.   Labs ordered as below including:     Lab Results   Component Value Date    HGBA1C 12.1 (H) 05/06/2021   , No results found for: LDL,   Lab Results   Component Value Date    CREATININE 0.64 05/06/2021         - Microalbumin, Random Urine  - Glycosylated Hemoglobin A1c  - LDL Cholesterol, Direct  - Comprehensive Metabolic Panel  - Ambulatory referral to Medication Management    4. Iron deficiency anemia due to chronic blood loss  Stable with ongoing mildly low iron- continue children's  vitamin with iron.   - HM2(CBC w/o Differential)  - Ferritin  - Ambulatory referral to Medication Management    5. Encounter for hepatitis C screening test for low risk patient  - Hepatitis C Antibody (Anti-HCV)    6. Polyneuropathy associated with underlying disease (H)  Continue ibuprofen daily.      7. Major depressive disorder, recurrent episode, mild (H)  Stable continue sertraline 50mg daily    8. Class 3 severe obesity due to excess calories with serious comorbidity and body mass index (BMI) of 50.0 to 59.9 in adult (H)  Limited mobility -uses motorized scooter    9. Restless Legs Syndrome  Restart low dose mirapex once daily.  Continue iron supplementation.    - pramipexole (MIRAPEX) 0.5 MG tablet; Take 1 tablet (0.5 mg total) by mouth daily as needed.  Dispense: 90 tablet; Refill: 4    10. Perennial allergic rhinitis  Refill as requested.  May help with her cough that is coming back again.    - montelukast (SINGULAIR) 10 mg tablet; Take 1 tablet (10 mg total) by mouth daily as needed (allergies).  Dispense: 90 tablet; Refill: 4    11. Chronic Reflux Esophagitis  eval by GI shown to likely contribute to her cough and some bloody mucous.  Limit NSAIDs.  Continue PPI daily and diabetes management.    - Ambulatory referral to Medication Management    12. Diabetic gastroparesis (H)  As above.  Needs better control of her diabetes.  Reviewed recommendations by GI to take ppi daily along with phenergan daily to help with symptoms.    - Ambulatory referral to Medication Management  - promethazine (PHENERGAN) 12.5 MG tablet; Take 1 tablet (12.5 mg total) by mouth daily as needed (as needed prior to meals for nausea).  Dispense: 90 tablet; Refill: 4  - pantoprazole (PROTONIX) 40 MG tablet; Take 1 tablet (40 mg total) by mouth daily.  Dispense: 90 tablet; Refill: 4    13. Osteoarthrosis Of The Knee  Limiting mobility along with obesity.  Using motorized scooter.  Limit NSAIDs and take with food.    - ibuprofen  (ADVIL,MOTRIN) 800 MG tablet; Take 1 tablet (800 mg total) by mouth daily.  Dispense: 90 tablet; Refill: 3    14. Scab  Pt has h/o picking habits and itchy skin.  Persistent scabbing on breasts- appears benign-no active infection.  Trial of topical steroids and follow-up in 6 weeks to recheck.   - hydrocortisone 2.5 % ointment; Apply to irritated skin twice a day as needed  Dispense: 30 g; Refill: 11      Return in about 6 weeks (around 6/17/2021) for Clinic visit.    Patient Education/AVS:  Patient Instructions       HPI:   Mariola Rdz is a 45 y.o. female and presents to clinic today for the following health issues cough on and off.     Rash on right breast for months now- a little better.      Pain at the bottom of the neck- worse when she looks up.     Wants to get covid shot but only wants one and wants it close to her home.      Test strips not covered.  tresiba 50u daily.  Metformin 1000mg two times a day,     RLS coming back again- hard to stay still.  Was doing okay until about 2 weeks ago.  Ran out of pramipexole due to covid and not coming in to the doctor again.     Taking a medicine before supper to help with nausea and vomiting.  Throwing up phlegm with a very small amount of blood- not like it was before.  Vomiting 1-2x/week usually after evening meal and with coughing.  Starting to urinate with coughing as well.      No more constipation- more loose stools. Taking tums for acid.  Taking chewable iron daily.  Not on PPI anymore.      Taking singulair daily as needed for allergies.      Cough when she lays down, especially on her right side and sometimes when she is sitting up will get a tickle and a hard cough that causes throat pain and vomiting.  Feels like something is stuck in her throat.  Only phlegm comes out.  No more food coming up when she gags.  Maybe a little bit of blood.  Similar to her previous cough.     Gets winded easily with walking- uses a walker or scooter.      Using  "ibuprofen as needed- not daily.  1-2 days/week.      Scabbed sores on right breast for months and not healing.  Has hydroxyzine to use 2-3x/week for severe itching.      History summarized from1-2:last GI consult 2/2020- reflux and gastroparesis.  Phenergan as needed before meals.  High dose PPI daily.  Old Records-1: Outside allergies, meds, problems and immunizations were reconciled as needed from [facility]where  Radiology tests reviewed-1: last CXR 2020 wnl  Lab tests reviewed-1: 7500-1621    Social History     Social History Narrative    Lives with 2 roomates.  Has order for protection against her boyfriend    Son has been adopted out       Physical Exam:  /79 (Patient Site: Right Arm, Patient Position: Sitting, Cuff Size: Adult Regular) Comment (Patient Site): forearm  Pulse (!) 109   Temp 97.7  F (36.5  C) (Other)   Ht 5' 1\" (1.549 m)   Wt (!) 354 lb 4 oz (160.7 kg)   LMP 05/04/2021 (Exact Date)   BMI 66.93 kg/m   Body mass index is 66.93 kg/m . Patient's last menstrual period was 05/04/2021 (exact date).  Vital signs reviewed  Wt Readings from Last 3 Encounters:   05/06/21 (!) 354 lb 4 oz (160.7 kg)   10/08/20 (!) 342 lb (155.1 kg)   10/08/20 (!) 342 lb (155.1 kg)     No data recorded  PHQ-9 Total Score: 13 (7/17/2020  2:39 PM)    PHQ-2 Total Score: 3 (7/17/2020  2:39 PM)    No data recorded    All normal as below except abnormalities include: pt has several scabs on both breasts- no open sores, no bleeding, minimal redness surrounding, does not involved the areola or nipples, no drainage.  Similar scabs on her arms and back from picking.    General is a  45 y.o. female sitting comfortably in no apparent distress wearing a mask.  HEENT: Eye exam normal   Neck: Supple without lymphadenopathy or thyromegaly  CV: Regular rate and rhythm S1S2 without rubs, murmurs or gallops,   Lungs: Clear to auscultation bilaterally  Abd:  +BS, soft NT/ND,  No masses or organomegaly  Extremities: Warm, No Edema, 2+ " Pedal and radial pulses bilaterally  Neuro/MSK: Able to ambulate around the exam room with equal movement, strength and normal coordination of the upper and lower extremeties symmetrically    Giselle Munoz MD  [facility]

## 2021-06-17 NOTE — PATIENT INSTRUCTIONS - HE
Patient Instructions by Giselle Munoz MD at 10/10/2019  3:00 PM     Author: Giselle Munoz MD Service: -- Author Type: Physician    Filed: 10/10/2019  3:49 PM Encounter Date: 10/10/2019 Status: Signed    : Giselle Munoz MD (Physician)         Patient Education   Depression and Suicide in Older Adults  Nearly 2 million older Americans have some type of depression. Sadly, some of them even take their own lives. Yet depression among older adults is often ignored. Learn the warning signs. You may help spare a loved one needless pain. You may also save a life.       What Is Depression?  Depression is a mood disorder that affects the way you think and feel. The most common symptom is a feeling of deep sadness. People who are depressed also may seem tired and listless. And nothing seems to give them pleasure. Its normal to grieve or be sad sometimes. But sadness lessens or passes with time. Depression rarely goes away or improves on its own. Other symptoms of depression are:    Sleeping more or less than normal    Eating more or less than normal    Having headaches, stomachaches, or other pains that dont go away    Feeling nervous, empty, or worthless    Crying a great deal    Thinking or talking about suicide or death    Feeling confused or forgetful  What Causes It?  The causes of depression arent fully known. Certain chemicals in the brain play a role. Depression does run in families. And life stresses can also trigger depression in some people. That may be the case with older adults. They often face great burdens, such as the death of friends or a spouse. They may have failing health. And they are more likely to be alone, lonely, or poor.  How You Can Help  Often, depressed people may not want to ask for help. When they do, they may be ignored. Or, they may receive the wrong treatment. You can help by showing parents and older friends love and support. If they seem depressed, help them find  the right treatment. Talk to your doctor. Or contact a local mental health center, social service agency, or hospital. With modern treatment, no one has to suffer from depression.  Resources:    National [place] of Mental Health  781.188.7538  www.nimh.nih.gov    National [place] on Mental Illness  752.136.2295  www.gerardo.org    [facility]  231.815.5159  www.[facility].org    National Suicide Hotline  123.259.2550 (800-SUICIDE)      8527-5503 [employer]. [address] [place] [address]. All rights reserved. This information is not intended as a substitute for professional medical care. Always follow your healthcare professional's instructions.         Patient Education   Personalized Prevention Plan  You are due for the preventive services outlined below.  Your care team is available to assist you in scheduling these services.  If you have already completed any of these items, please share that information with your care team to update in your medical record.  Health Maintenance   Topic Date Due   ? MEDICARE ANNUAL WELLNESS VISIT  11/30/2016   ? ADVANCE CARE PLANNING  06/04/2018   ? PAP SMEAR  06/19/2019   ? HPV TEST  06/19/2019   ? INFLUENZA VACCINE RULE BASED (1) 08/01/2019   ? DIABETES OPHTHALMOLOGY EXAM  08/21/2019   ? DEPRESSION FOLLOW UP  09/21/2019   ? DIABETES FOLLOW-UP  10/15/2019   ? MAMMOGRAM  11/14/2019   ? DIABETES HEMOGLOBIN A1C  03/12/2020   ? DIABETES FOOT EXAM  03/21/2020   ? DIABETES URINE MICROALBUMIN  03/21/2020   ? TD 18+ HE  11/21/2026   ? HIV SCREENING  Completed   ? PNEUMOCOCCAL IMMUNIZATION 19-64 MEDIUM RISK  Completed   ? TDAP ADULT ONE TIME DOSE  Completed

## 2021-06-18 NOTE — PROGRESS NOTES
Six days sprain.  Ice   ibu  Elevated.    Inverted.  Has been walking on it partial weight bearing thinking it would get better    No distal sxs    ROS: as noted above    OBJECTIVE:   Vitals:    05/30/18 1457   BP: 106/76   Pulse: 88   Resp: 20      Eyes: non icteric, noninflamed  Lungs: no resp distress  Heart: regular  Ankles: 1-2 plus lateral malleolar edema.   Inconsistent tenderness from none to dramatic over distal fibula one third. Lateral malleolus, proximal fifth metatarsal.  Moderate pain with passive inversion.   Stands and weight bears without apparent pain.  Xray ordered and interpreted personally: old appearing distal fibular transverse fx.       Report preliminary concurs re transverse fx but does not call it old    Muscles: nontender  Mental status: euthymic  Neuro: nonfocal    ASSESSMENT/PLAN:    1. Ankle injury, left, initial encounter  XR Ankle Left 3 or More VWS     Splint to avoid inversion.  Ice elevate  Ice and alphabet exercises within limits of pain    follow up one pcp

## 2021-06-18 NOTE — PATIENT INSTRUCTIONS - HE
Patient Instructions by Giselle Munoz MD at 5/6/2021  4:00 PM     Author: Giselle Munoz MD Service: -- Author Type: Physician    Filed: 5/6/2021  5:19 PM Encounter Date: 5/6/2021 Status: Addendum    : Giselle Munoz MD (Physician)    Related Notes: Original Note by Giselle Munoz MD (Physician) filed at 5/6/2021  4:55 PM

## 2021-06-19 NOTE — LETTER
"Letter by Iván Bear MD at      Author: Iván Bear MD Service: -- Author Type: --    Filed:  Encounter Date: 2019 Status: Signed       2019    Dear Dr. Munoz,    See below for documentation of a telephone conversation that I had today with Mariola Rdz ( 1975).  Please feel free to call me at any time if needed.    Pulmonary Telephone Note     Sinus CT shows midline deviation to the left and complete opacification of the left maxillary sinus. The interpretation calls this \"atelectatic sinus\" but clearly it is filled with fluid. Will therefore start nasal fluticasone one spray in each nostril daily and change from cetirizine to cetirizine-pseudoephedrine one pill daily. Asked her to have her blood pressure checked after starting the decongestant. Will hold off on gabapentin unless her cough is refractory to other therapy. I will see her back in 6-8 weeks. She is in agreement.    Sincerely,    Iván Bear MD  Pulmonary and Critical Care Medicine  [facility]  Cell [phone]  Office [phone]  Pager [phone]           "

## 2021-06-19 NOTE — LETTER
Letter by Iván Bear MD at      Author: Iván Bear MD Service: -- Author Type: --    Filed:  Encounter Date: 10/29/2019 Status: Signed         Giselle Munoz MD  [address] [place] [id number]                                  October 29, 2019    Patient: Mariola Rdz   MR Number: [id number]   YOB: 1975   Date of Visit: 10/29/2019     Dear Dr. Alexander MD:    I saw Mariola Rdz today at the [facility]. Below are the relevant portions of my assessment and plan of care.    If you have questions, please do not hesitate to call me. I look forward to following Mariola along with you.    Sincerely,        Iván Bear MD          CC  No Recipients  Iván Bear MD  10/29/2019  6:26 PM  Sign when Signing Visit  Pulmonary Clinic Outpatient Consultation    Assessment and Plan:   44 year old female never smoker with a history of severe obesity, urticaria, RLS, osteoarthritis, dyslipidemia, H pylori gastritis, DM2, diabetic neuropathy, costochondritis, GERD, iron deficiency anemia, seasonal allergies, venous insufficiency, back pain, and depression/anxiety, presenting for evaluation of chronic cough and hematemesis.    Chronic cough and hematemesis: No evidence of an objective parenchymal lung disease on chest CT. Unclear association between her cough and hematemesis; she has a history of gastritis and GERD, though continues to use NSAIDs for pain, which could cause dyspepsis and nausea; dulaglutide also causes nausea in 10-20% of patients. If the emesis is truly induced by chronic cough, DDx includes upper airway cough syndrome due to chronic rhinosinusitis, refractory cough-variant reflux, cough-variant asthma (significant risk of this with her severe obesity). Unfortunately, she declines to pursue methacholine challenge testing, pulmonary function testing, and empiric inhaled therapy, all due to aversion/gagging with nebulized therapies and inhalers.  She also declines nasal corticosteroid, a key component of empiric therapy for chronic rhinosinusitis, due to aversion to nasal sprays. This will limit our options considerably. Given her frequent emesis, a possible sign of gastroparesis, and easy gagging, as well as diabetic neuropathy, she may benefit from empiric gabapentin, a proven therapy for refractory idiopathic chronic cough; she is willing to try this.    Plan:  - sinus CT; I will call her with the result  - gabapentin 300 mg two times a day for 2 weeks, then 600 mg two times a day for 2 weeks, then 900 mg two times a day  - continue omeprazole 40 mg daily  - continue montelukast 10 mg at bedtime  - continue cetirizine 10 mg daily  - consider dulaglutide as potential contributors to nausea/emesis  - follow up in 3 months  - encouraged her to call any time with questions or concerning symptoms    I appreciate the opportunity to participate in the care of Ms. Flynn. Please feel free to contact me at any time.    CCx: chronic cough, hematemesis    HPI: 44 year old female never smoker with a history of severe obesity, urticaria, RLS, osteoarthritis, dyslipidemia, H pylori gastritis, DM2, diabetic neuropathy, costochondritis, GERD, iron deficiency anemia, seasonal allergies, venous insufficiency, back pain, and depression/anxiety, presenting for evaluation of chronic cough and hematemesis. Cough started 7 months ago, more at night but also during the day. Nonproductive. Triggers emesis with food and blood in vomit. No wheezing. Occasional allergies. On dulaglutide for 1-2 years. Chest CT with tiny benign nodules, no pulmonary parenchymal disease. Lives with people who smoke; they currently do not smoke inside the home. No FHx of lung disease. She does not want to pursue methacholine challenge, PFTs, and does not want inhaler, nebulizer, or nasal spray, all due to aversion/gagging with nebs/inhalers/sprays. Has tingling of hands and feet, easy vomiting; I do  not see gastric emptying study but she is on metoclopramide. She has a history of H pylori gastritis and GERD but is on ibuprofen for pain. In May 2019 was hospitalized and EGD did not identify any abnormalities.    ROS:  A 12-system review was obtained and was negative with the exception of the symptoms endorsed in the history of present illness.    PMH:  BMI 59.1  Urticaria  RLS  Knee OA  DL  H pylori gastritis  DM2  DKA 2015  Diabetic neuropathy  Costochondritis  GERD  ALDEN  Seasonal allergies  Venous stasis  Plantar fasciitis  Low back pain  Vitamin D deficiency  MDD/JOSE MARIA    PSH:  Past Surgical History:   Procedure Laterality Date   ?  SECTION  2017   ? INSERT MIDLINE HE  2015        ? [place]  DELIVERY ONLY      Description:  Section;  Proc Date: 2001;  Comments: fetal distress   ? [place] COLONOSCOPY FLX DX W/COLLJ SPEC WHEN PFRMD N/A 2019    Procedure: COLONOSCOPY;  Surgeon: Frankie Maher MD;  Location: [facility];  Service: Gastroenterology   ? [place] ESOPHAGOGASTRODUODENOSCOPY TRANSORAL DIAGNOSTIC N/A 2019    Procedure: ESOPHAGOGASTRODUODENOSCOPY (EGD);  Surgeon: Frankie Maher MD;  Location: [facility];  Service: Gastroenterology   ? [place] OPEN TX TRIMALLEOLAR ANKLE FX W/O FIX PST LIP Right     Description: Open Treatment Of Trimalleolar Ankle Fracture;  Proc Date: 2009;  Comments: complicated by post-op infections   ? [place] REMOVAL GALLBLADDER      Description: Cholecystectomy;  Recorded: 2009;   ? pulley release Left     index   ? US BREAST CORE BIOPSY LEFT Left 2018       Allergies:  Allergies   Allergen Reactions   ? Vicks Vaporub [Camphor-Eucalyptus Oil-Menthol] Hives   ? Amoxicillin Nausea And Vomiting   ? Codeine Nausea Only and Hives   ? Cranberry Diarrhea   ? Menthol    ? Penicillins Nausea And Vomiting   ? Permethrin Itching   ? [place] Rash       Family HX:  Family History   Problem Relation Age of Onset   ? No  "Medical Problems Sister    ? Asthma Brother    ? Emphysema Brother    ? No Medical Problems Sister    ? Cancer Mother    ? Diabetes Mother        Social Hx:  Social History     Socioeconomic History   ? Marital status: Single     Spouse name: Not on file   ? Number of children: Not on file   ? Years of education: Not on file   ? Highest education level: Not on file   Occupational History   ? Occupation: Disability for mental health   Social Needs   ? Financial resource strain: Not on file   ? Food insecurity:     Worry: Not on file     Inability: Not on file   ? Transportation needs:     Medical: Not on file     Non-medical: Not on file   Tobacco Use   ? Smoking status: Never Smoker   ? Smokeless tobacco: Never Used   ? Tobacco comment: family members smoke outside    Substance and Sexual Activity   ? Alcohol use: No   ? Drug use: No   ? Sexual activity: Yes     Partners: Male     Birth control/protection: Condom   Lifestyle   ? Physical activity:     Days per week: Not on file     Minutes per session: Not on file   ? Stress: Not on file   Relationships   ? Social connections:     Talks on phone: Not on file     Gets together: Not on file     Attends Buddhist service: Not on file     Active member of club or organization: Not on file     Attends meetings of clubs or organizations: Not on file     Relationship status: Not on file   ? Intimate partner violence:     Fear of current or ex partner: Not on file     Emotionally abused: Not on file     Physically abused: Not on file     Forced sexual activity: Not on file   Other Topics Concern   ? Not on file   Social History Narrative    Lives with Geoff alexander, and 2 roomates    Son has been adopted out       Current Meds:  Current Outpatient Medications   Medication Sig Dispense Refill   ? BD ULTRA-FINE MILLI PEN NEEDLE 32 gauge x 5/32\" Ndle USE THREE TIMES DAILY 300 each 0   ? blood glucose meter (GLUCOMETER) Dispense meter covered by pt ins.     ? BLOOD SUGAR " "DIAGNOSTIC (TRUETEST TEST STRIPS MISC) Use As Directed. Test 3 times daily     ? buPROPion (WELLBUTRIN XL) 150 MG 24 hr tablet TAKE 1 TABLET(150 MG) BY MOUTH DAILY 90 tablet 3   ? cetirizine (ZYRTEC) 10 MG tablet Take 10 mg by mouth 2 (two) times a day.     ? RONAN/IRON chewable tablet CSW 1 T PO D  7   ? CONTOUR NEXT TEST STRIPS strips TEST TID  3   ? ergocalciferol (ERGOCALCIFEROL) 50,000 unit capsule Take 1 capsule (50,000 Units total) by mouth once a week. 12 capsule 4   ? fluocinonide (LIDEX) 0.05 % cream ALBAN  BID      PRF  ITCHY  SPOTS    FOR  LESS  THAN  2  WEEKS  3   ? GAVILYTE-G 236-22.74-6.74 -5.86 gram solution      ? glipiZIDE (GLUCOTROL XL) 10 MG 24 hr tablet Take 1 tablet by mouth daily before breakfast  3   ? hydrOXYzine HCl (ATARAX) 50 MG tablet 1-2 every 6 hours as needed for itching 120 tablet 11   ? ibuprofen (ADVIL,MOTRIN) 800 MG tablet Take 1 tablet (800 mg total) by mouth daily as needed for pain. 30 tablet 3   ? insulin glargine (BASAGLAR KWIKPEN U-100 INSULIN) 100 unit/mL (3 mL) pen Inject 50 Units under the skin at bedtime.            ? lancets (TRUEPLUS LANCETS) 33 gauge Misc Use As Directed. Check BG 3 tmes daily     ? metFORMIN (GLUCOPHAGE) 1000 MG tablet TAKE 1 TABLET(1000 MG) BY MOUTH TWICE DAILY WITH MEALS 180 tablet 3   ? metoclopramide (REGLAN) 5 MG tablet Take 1 tablet (5 mg total) by mouth daily as needed for nausea. 30 tablet 1   ? miscellaneous medical supply Misc Compression Stockings- custom knee high with zipper 4 each 0   ? montelukast (SINGULAIR) 10 mg tablet TAKE 1 TABLET(10 MG) BY MOUTH DAILY AT BEDTIME AS DIRECTED 90 tablet 4   ? NOVOFINE PLUS 32 gauge x 1/6\" Ndle Inject 1 each under the skin daily. With basaglar insulin     ? omeprazole (PRILOSEC) 40 MG capsule TAKE 1 CAPSULE(40 MG) BY MOUTH DAILY BEFORE BREAKFAST 90 capsule 3   ? ONETOUCH VERIO FLEX Misc TEST  TID  0   ? pediatric multivit-iron-min Chew Chew 1 tablet daily. 100 each 4   ? pioglitazone (ACTOS) 30 MG " "tablet Take 1 tablet by mouth daily  0   ? polyethylene glycol (GLYCOLAX) 17 gram/dose powder Poor in 64 oz of gatoraid and drink over 4 hours the day before capsule study 116 g 0   ? pramipexole (MIRAPEX) 0.5 MG tablet   3   ? sertraline (ZOLOFT) 100 MG tablet TAKE 2 TABLETS BY MOUTH DAILY 180 tablet 2   ? traMADol (ULTRAM) 50 mg tablet Take 1 tablet (50 mg total) by mouth daily as needed for pain. 15 tablet 0   ? TRULICITY 1.5 mg/0.5 mL PnIj INJECT 1.5 MG SUBCUTANEOUS ONCE A WEEK  1   ? gabapentin (NEURONTIN) 300 MG capsule Take 1 pill twice daily for 2 weeks, then 2 pills twice daily for 2 weeks, then 3 pills twice daily. 180 capsule 11     No current facility-administered medications for this visit.        Physical Exam:  /82   Pulse (!) 110   Ht 5' 2\" (1.575 m)   SpO2 94%   Breastfeeding? No   BMI 59.08 kg/m     Gen: alert, oriented, no distress  HEENT: nasal turbinates are moderately edematous, no oropharyngeal lesions, no cervical or supraclavicular lymphadenopathy  CV: tachy, regular, no M/G/R  Resp: CTAB, no focal crackles or wheezes  Abd: soft, nontender, no palpable organomegaly  Skin: no apparent rashes  Ext: no cyanosis, clubbing or edema  Neuro: alert, nonfocal    Labs:  reviewed    Imaging studies:  CT chest with contrast (June 2019):  - images directly reviewed, formal interpretation follows:  FINDINGS:   LUNGS AND PLEURA: Multiple 3 mm and smaller pulmonary and pleural-based nodules new from prior. No focal infiltrate or effusion.     MEDIASTINUM: No mass or adenopathy.     LIMITED UPPER ABDOMEN: Hepatic steatosis. Cholecystectomy.     MUSCULOSKELETAL: Degenerative disease. Old left third rib fracture.     IMPRESSION:   CONCLUSION:   1.  Multiple 3 mm and smaller pulmonary and pleural based nodules likely benign though technically indeterminate. If no more recent prior see guidelines below.  2.  Hepatic steatosis.    Iván Bear MD  [facility]  Cell " [phone]  Office [phone]  Pager [phone]

## 2021-06-19 NOTE — LETTER
Letter by Giselle Munoz MD at      Author: Gsielle Munoz MD Service: -- Author Type: --    Filed:  Encounter Date: 10/17/2019 Status: Signed         Mariola Rdz  [address] [place] [address]             October 17, 2019         Dear Ms. Rdz,    Below are the results from your recent visit:    Resulted Orders   Gynecologic Cytology (PAP Smear)   Result Value Ref Range    Case Report       Gynecologic Cytology Report                       Case: [id number]                                   Authorizing Provider:  Giselle Munoz MD    Collected:           10/10/2019 1644              Ordering Location:     [facility] (formerly [facility]] Family  Received:            10/10/2019 1644                                     Medicine/OB                                                                  First Screen:          [facility]anto Spear, CT                                                                           (ASCP)                                                                       Rescreen:              [facility] CT                                                                            (ASCP)                                                                       Specimen:    SUREPATH PAP, SCREENING, Endocervical/cervical                                             Interpretation  Negative for squamous intraepithelial lesion or malignancy.      Negative for squamous intraepithelial lesion or malignancy    Result Flag Normal Normal    Specimen Adequacy       Satisfactory for evaluation, endocervical/transformation zone component absent    HPV Reflex? Yes regardless of result     HIGH RISK No     LMP/Menopause Date 8/2019     Abnormal Bleeding No     Pt Status na     Birth Control/Hormones None     Previous Normal/Date 2014     Prev Abn Date/Dx no     Cervical Appearance difficult to visualize    HM2(CBC w/o Differential)   Result Value Ref Range    WBC 9.6 4.0 -  11.0 thou/uL    RBC 5.12 3.80 - 5.40 mill/uL    Hemoglobin 10.7 (L) 12.0 - 16.0 g/dL    Hematocrit 33.6 (L) 35.0 - 47.0 %    MCV 66 (L) 80 - 100 fL    MCH 20.9 (L) 27.0 - 34.0 pg    MCHC 31.9 (L) 32.0 - 36.0 g/dL    RDW 16.4 (H) 11.0 - 14.5 %    Platelets 467 (H) 140 - 440 thou/uL    MPV 8.8 7.0 - 10.0 fL   Comprehensive Metabolic Panel   Result Value Ref Range    Sodium 135 (L) 136 - 145 mmol/L    Potassium 4.4 3.5 - 5.0 mmol/L    Chloride 99 98 - 107 mmol/L    CO2 26 22 - 31 mmol/L    Anion Gap, Calculation 10 5 - 18 mmol/L    Glucose 312 (H) 70 - 125 mg/dL    BUN 9 8 - 22 mg/dL    Creatinine 0.82 0.60 - 1.10 mg/dL    GFR MDRD Af Amer >60 >60 mL/min/1.73m2    GFR MDRD Non Af Amer >60 >60 mL/min/1.73m2    Bilirubin, Total 0.6 0.0 - 1.0 mg/dL    Calcium 9.1 8.5 - 10.5 mg/dL    Protein, Total 7.3 6.0 - 8.0 g/dL    Albumin 3.0 (L) 3.5 - 5.0 g/dL    Alkaline Phosphatase 97 45 - 120 U/L    AST 18 0 - 40 U/L    ALT 20 0 - 45 U/L    Narrative    Fasting Glucose reference range is 70-99 mg/dL per  American Diabetes Association (ADA) guidelines.   LDL Cholesterol, Direct   Result Value Ref Range    Direct  (H) <=129 mg/dl   Vitamin B12   Result Value Ref Range    Vitamin B-12 508 213 - 816 pg/mL   Vitamin D, Total (25-Hydroxy)   Result Value Ref Range    Vitamin D, Total (25-Hydroxy) 19.6 (L) 30.0 - 80.0 ng/mL    Narrative    Deficiency <10.0 ng/mL  Insufficiency 10.0-29.9 ng/mL  Sufficiency 30.0-80.0 ng/mL  Toxicity (possible) >100.0 ng/mL   Magnesium   Result Value Ref Range    Magnesium 1.8 1.8 - 2.6 mg/dL   Beta-hCG, Quantitative   Result Value Ref Range    Beta-hCG, Quantitative <2 0 - 4 mlU/mL    Narrative    Non-pregnant female . . . . . . . . . . . . . 0-4 (<5) mIU/mL  Equivocal result for early pregnancy . . . . 5-24 mIU/mL  Pregnant Female:  -------------------------------------------------------------  Weeks Post LMP Approximate HCG range(mIU/mL)  (Last Menstrual  Period)range  -------------------------------------------------------------  3-4 Weeks . . . . . . . 9- 130 mIU/mL  4-5 Weeks . . . . . . . 75- 2,600 mIU/mL  5-6 Weeks . . . . . . 850- 20,800 mIU/mL  6-7 Weeks . . . . . 4,000-100,200 mIU/mL  7-12 Weeks . . . . . 11,500-289,000 mIU/mL  12-16 Weeks . . . . . 18,300-137,000 mIU/mL  16-29 Weeks. . . . . . 1,400- 53,000 mIU/mL  (2nd Trimester)    29-41 Weeks. . . . . . . 940- 60,000 mIU/mL    (3rd Trimester)  INTERPRETIVE NOTE:  For diagnostic purposes, hCG results should be used  conjunction with other data.  If the hCG level is inconsistent with clinical evidence,  results should be confirmed by an alternate hCG method.  This assay is approved for use in the early detection  of pregnancy only. It is not approved for any other  uses such as tumor marker screening or monitoring.  Beta HCG ranges were updated 2/2007 to reflect  methodology referencing to the 4th World Health  Organization (WHO) International Standard.   Thyroid Cascade   Result Value Ref Range    TSH 1.11 0.30 - 5.00 uIU/mL   HPV High Risk DNA Cervical   Result Value Ref Range    HPV Source SurePath     HPV16 DNA Negative NEG    HPV18 DNA Negative NEG    Other HR HPV Negative NEG    Final Diagnosis SEE NOTES       Comment:      This patient's sample is negative for HPV DNA.  This test was developed and its performance characteristics determined by the  [facility]. It  has not been cleared or approved by the FDA. The laboratory is regulated under  CLIA as qualified to perform high-complexity testing. This test is used for  clinical purposes. It should not be regarded as investigational or for  research.  (Note)  METHODOLOGY:  The Roche matilde 4800 system uses automated extraction,  simultaneous amplification of HPV (L1 region) and beta-globin,  followed by  real time detection of fluorescent labeled HPV and beta  globin using specific  oligonucleotide probes . The test specifically  identifies types HPV 16 DNA and HPV 18 DNA while concurrently  detecting the rest of the high risk types (31, 33, 35, 39, 45, 51,  52, 56, 58, 59, 66 or 68).    COMMENTS:  This test is not intended for use as a screening device  for women under age 30 with normal cervical   cytology.  Results should  be correlated with cytologic and histologic findings. Close clinical  followup is recommended.        Specimen Description Cervical Cells       Comment:        Performed and/or entered by:  [address] [address]        Your pap smear is normal and you do NOT have the HPV infection that causes cervical cancer.  We can recheck your pap smear again in 3 years.      Your thyroid level is healthy.     Your magnesium is low but normal.  Your b12 level is healthy.     Your vitamin D level is low- you should take this dose at least 1-2 times a week.  Meeting with the pharmacist may help figure out why this isn't getting better.     Your liver and kidney tests are healthy.      Your cholesterol is too high- you will need to take a cholesterol pill when you go through menopause.    Your sugars are too high- see our pharmacist to make sure your meds are all correct.      Your anemia is doing okay- still low but not getting worse.      Please call with questions or contact us using [employer].    Sincerely,        Electronically signed by Giselle Munoz MD

## 2021-06-20 NOTE — LETTER
Letter by Giselle Munoz MD at      Author: Giselle Munoz MD Service: -- Author Type: --    Filed:  Encounter Date: 3/20/2020 Status: (Other)       20     Fax:  [phone]    RE: Mariola Rdz  : 1975    To Whom It May Concern:    Mariola Rdz has a new diagnosis that requires to change her verification of prescribed diet needs.  Please fax us a new form to complete to ensure she has the food she needs to stay healthy.         Please contact me with any questions.     Sincerely,        Giselle Munoz MD

## 2021-06-20 NOTE — LETTER
Letter by Iván Bear MD at      Author: Iván Bear MD Service: -- Author Type: --    Filed:  Encounter Date: 1/20/2020 Status: Signed         Giselle Munoz MD  [address] [place] [id number]                                  January 20, 2020    Patient: Mariola Rdz   MR Number: [id number]   YOB: 1975   Date of Visit: 1/20/2020     Dear Dr. Alexander MD:    I saw Mariola Rdz today at the [facility]. Below are the relevant portions of my assessment and plan of care.    If you have questions, please do not hesitate to call me. I look forward to following Mariola along with you.    Sincerely,        Iván Bear MD          CC  No Recipients  Iván Bear MD  1/20/2020  5:24 PM  Sign when Signing Visit  Pulmonary Clinic Follow-up Visit    Assessment and Plan:   44 year old female never smoker with a history of severe obesity, urticaria, RLS, osteoarthritis, dyslipidemia, H pylori gastritis, DM2, diabetic neuropathy, costochondritis, GERD, iron deficiency anemia, seasonal allergies, venous insufficiency, back pain, and depression/anxiety, presenting for evaluation of chronic cough and emesis.     Chronic cough and hematemesis: No evidence of an objective parenchymal lung disease on chest CT. Unclear association between her cough and emesis; she has a history of gastritis and GERD, though continues to use NSAIDs for pain, which could cause dyspepsis and nausea; dulaglutide also causes nausea in 10-20% of patients. She notes emesis 30-60 minutes after eating, which could relate to undiagnosed diabetic gastroparesis. DDx of cough includes upper airway cough syndrome due to chronic rhinosinusitis, refractory cough-variant reflux, cough-variant asthma (significant risk of this with her severe obesity). Unfortunately, she has declined to pursue methacholine challenge testing, pulmonary function testing, or empiric inhaled therapy, all due to  aversion/gagging with nebulized therapies and inhalers. She had left maxillary sinus opacification on CT, but did not fill the cetirizine-pseudoephedrine or nasal steroid due to cost.     Plan:  - will obtain esophagram and gastric emptying study; I will call her with the results  - advised her to take OTC nasal triamcinolone or fluticasone daily; this only costs $10-13  - continue cetirizine 10 mg daily  - continue omeprazole 40 mg daily  - continue montelukast 10 mg at bedtime  - again would consider dulaglutide as potential contributors to nausea/emesis; I advised the patient to check with Dr. Munoz on a potential alternative therapy  - if refractory cough (though unclear association between this and her emesis), could consider gabapentin 300 mg two times a day for 2 weeks, then 600 mg two times a day for 2 weeks, then 900 mg two times a day  - follow up in 6 weeks  - encouraged her to call any time with questions or concerning symptoms     I appreciate the opportunity to participate in the care of Ms. Flynn. Please feel free to contact me at any time.     CCx: chronic cough and emesis    HPI: 44 year old female never smoker with a history of severe obesity, urticaria, RLS, osteoarthritis, dyslipidemia, H pylori gastritis, DM2, diabetic neuropathy, costochondritis, GERD, iron deficiency anemia, seasonal allergies, venous insufficiency, back pain, and depression/anxiety, presenting for evaluation of chronic cough and emesis. No evidence of an objective parenchymal lung disease on chest CT. Unclear association between her cough and emesis; she has a history of gastritis and GERD, though continues to use NSAIDs for pain, which could cause dyspepsis and nausea; dulaglutide also causes nausea in 10-20% of patients. She notes emesis 30-60 minutes after eating, which could relate to undiagnosed diabetic gastroparesis. DDx of cough includes upper airway cough syndrome due to chronic rhinosinusitis, refractory  cough-variant reflux, cough-variant asthma (significant risk of this with her severe obesity). Unfortunately, she has declined to pursue methacholine challenge testing, pulmonary function testing, or empiric inhaled therapy, all due to aversion/gagging with nebulized therapies and inhalers. She had left maxillary sinus opacification on CT, but did not fill the cetirizine-pseudoephedrine or nasal steroid due to cost.    ROS:  A 12-system review was obtained and was negative with the exception of the symptoms endorsed in the history of present illness.    PMH:  BMI 59.1  Urticaria  RLS  Knee OA  DL  H pylori gastritis  DM2  DKA 2015  Diabetic neuropathy  Costochondritis  GERD  ALDEN  Seasonal allergies  Venous stasis  Plantar fasciitis  Low back pain  Vitamin D deficiency  MDD/JOSE MARIA    PSH:  Past Surgical History:   Procedure Laterality Date   ?  SECTION  2017   ? INSERT MIDLINE HE  2015        ? NV  DELIVERY ONLY      Description:  Section;  Proc Date: 2001;  Comments: fetal distress   ? NV COLONOSCOPY FLX DX W/COLLJ SPEC WHEN PFRMD N/A 2019    Procedure: COLONOSCOPY;  Surgeon: Frankie Maher MD;  Location: [facility];  Service: Gastroenterology   ? NV ESOPHAGOGASTRODUODENOSCOPY TRANSORAL DIAGNOSTIC N/A 2019    Procedure: ESOPHAGOGASTRODUODENOSCOPY (EGD);  Surgeon: Frankie Maher MD;  Location: [facility];  Service: Gastroenterology   ? NV OPEN TX TRIMALLEOLAR ANKLE FX W/O FIX PST LIP Right     Description: Open Treatment Of Trimalleolar Ankle Fracture;  Proc Date: 2009;  Comments: complicated by post-op infections   ? NV REMOVAL GALLBLADDER      Description: Cholecystectomy;  Recorded: 2009;   ? pulley release Left     index   ? US BREAST CORE BIOPSY LEFT Left 2018       Allergies:  Allergies   Allergen Reactions   ? Vicks Vaporub [Camphor-Eucalyptus Oil-Menthol] Hives   ? Amoxicillin Nausea And Vomiting   ? Codeine  Nausea Only and Hives   ? Cranberry Diarrhea   ? Menthol    ? Penicillins Nausea And Vomiting   ? Permethrin Itching   ? [place] Rash       Family HX:  Family History   Problem Relation Age of Onset   ? No Medical Problems Sister    ? Asthma Brother    ? Emphysema Brother    ? No Medical Problems Sister    ? Cancer Mother    ? Diabetes Mother        Social Hx:  Social History     Socioeconomic History   ? Marital status: Single     Spouse name: Not on file   ? Number of children: Not on file   ? Years of education: Not on file   ? Highest education level: Not on file   Occupational History   ? Occupation: Disability for mental health   Social Needs   ? Financial resource strain: Not on file   ? Food insecurity:     Worry: Not on file     Inability: Not on file   ? Transportation needs:     Medical: Not on file     Non-medical: Not on file   Tobacco Use   ? Smoking status: Never Smoker   ? Smokeless tobacco: Never Used   ? Tobacco comment: family members smoke outside    Substance and Sexual Activity   ? Alcohol use: No   ? Drug use: No   ? Sexual activity: Yes     Partners: Male     Birth control/protection: Condom   Lifestyle   ? Physical activity:     Days per week: Not on file     Minutes per session: Not on file   ? Stress: Not on file   Relationships   ? Social connections:     Talks on phone: Not on file     Gets together: Not on file     Attends Scientologist service: Not on file     Active member of club or organization: Not on file     Attends meetings of clubs or organizations: Not on file     Relationship status: Not on file   ? Intimate partner violence:     Fear of current or ex partner: Not on file     Emotionally abused: Not on file     Physically abused: Not on file     Forced sexual activity: Not on file   Other Topics Concern   ? Not on file   Social History Narrative    Lives with Geoff alexander, and 2 roomates    Son has been adopted out       Current Meds:  Current Outpatient Medications  "  Medication Sig Dispense Refill   ? BD ULTRA-FINE MILLI PEN NEEDLE 32 gauge x 5/32\" Ndle USE THREE TIMES DAILY 300 each 0   ? blood glucose meter (GLUCOMETER) Dispense meter covered by pt ins.     ? BLOOD SUGAR DIAGNOSTIC (TRUETEST TEST STRIPS MISC) Use As Directed. Test 3 times daily     ? buPROPion (WELLBUTRIN XL) 150 MG 24 hr tablet TAKE 1 TABLET(150 MG) BY MOUTH DAILY 90 tablet 3   ? cetirizine-pseudoephedrine (ZYRTEC-D) 5-120 mg per tablet Take 1 tablet by mouth 2 (two) times a day. 60 tablet 5   ? RONAN/IRON chewable tablet CSW 1 T PO D  7   ? CONTOUR NEXT TEST STRIPS strips TEST TID  3   ? ergocalciferol (ERGOCALCIFEROL) 50,000 unit capsule Take 1 capsule (50,000 Units total) by mouth once a week. 12 capsule 4   ? fluocinonide (LIDEX) 0.05 % cream ALBAN  BID      PRF  ITCHY  SPOTS    FOR  LESS  THAN  2  WEEKS  3   ? fluticasone (VERAMYST) 27.5 mcg/actuation nasal spray One spray in each nostril daily 10 g 11   ? GAVILYTE-G 236-22.74-6.74 -5.86 gram solution      ? glipiZIDE (GLUCOTROL XL) 10 MG 24 hr tablet Take 1 tablet by mouth daily before breakfast  3   ? hydrOXYzine HCl (ATARAX) 50 MG tablet TAKE 1 TO 2 TABLETS BY MOUTH EVERY 6 HOURS AS NEEDED FOR ITCHING 120 tablet 11   ? ibuprofen (ADVIL,MOTRIN) 800 MG tablet Take 1 tablet (800 mg total) by mouth daily as needed for pain. 30 tablet 3   ? insulin glargine (BASAGLAR KWIKPEN U-100 INSULIN) 100 unit/mL (3 mL) pen Inject 50 Units under the skin at bedtime.            ? lancets (TRUEPLUS LANCETS) 33 gauge Misc Use As Directed. Check BG 3 tmes daily     ? metFORMIN (GLUCOPHAGE) 1000 MG tablet TAKE 1 TABLET(1000 MG) BY MOUTH TWICE DAILY WITH MEALS 180 tablet 3   ? metoclopramide (REGLAN) 5 MG tablet Take 1 tablet (5 mg total) by mouth daily as needed for nausea. 30 tablet 1   ? miscellaneous medical supply Misc Compression Stockings- custom knee high with zipper 4 each 0   ? montelukast (SINGULAIR) 10 mg tablet TAKE 1 TABLET(10 MG) BY MOUTH DAILY AT BEDTIME AS " "DIRECTED 90 tablet 4   ? NOVOFINE PLUS 32 gauge x 1/6\" Ndle Inject 1 each under the skin daily. With basaglar insulin     ? omeprazole (PRILOSEC) 40 MG capsule TAKE 1 CAPSULE(40 MG) BY MOUTH DAILY BEFORE BREAKFAST 90 capsule 3   ? ONETOUCH VERIO FLEX Misc TEST  TID  0   ? pediatric multivit-iron-min Chew Chew 1 tablet daily. 100 each 4   ? pioglitazone (ACTOS) 30 MG tablet Take 1 tablet by mouth daily  0   ? polyethylene glycol (GLYCOLAX) 17 gram/dose powder Poor in 64 oz of gatoraid and drink over 4 hours the day before capsule study 116 g 0   ? pramipexole (MIRAPEX) 0.5 MG tablet   3   ? sertraline (ZOLOFT) 100 MG tablet TAKE 2 TABLETS BY MOUTH DAILY 180 tablet 3   ? traMADol (ULTRAM) 50 mg tablet Take 1 tablet (50 mg total) by mouth daily as needed for pain. 15 tablet 0   ? TRULICITY 1.5 mg/0.5 mL PnIj INJECT 1.5 MG SUBCUTANEOUS ONCE A WEEK  1     No current facility-administered medications for this visit.        Physical Exam:  /82   Pulse (!) 106   Ht 5' 2\" (1.575 m)   SpO2 98%   Breastfeeding No   BMI 59.08 kg/m     Gen: alert, oriented, no distress  HEENT: nasal turbinates are mildly edematous, no oropharyngeal lesions, no cervical or supraclavicular lymphadenopathy  CV: tachy, regular, no M/G/R  Resp: CTAB, no focal crackles or wheezes  Abd: soft, nontender, no palpable organomegaly  Skin: no apparent rashes  Ext: no cyanosis, clubbing or edema  Neuro: alert, nonfocal    Labs:  reviewed    Imaging studies:  CT chest with contrast (June 2019):  - images directly reviewed, formal interpretation follows:  FINDINGS:   LUNGS AND PLEURA: Multiple 3 mm and smaller pulmonary and pleural-based nodules new from prior. No focal infiltrate or effusion.     MEDIASTINUM: No mass or adenopathy.     LIMITED UPPER ABDOMEN: Hepatic steatosis. Cholecystectomy.     MUSCULOSKELETAL: Degenerative disease. Old left third rib fracture.     IMPRESSION:   CONCLUSION:   1.  Multiple 3 mm and smaller pulmonary and pleural " based nodules likely benign though technically indeterminate. If no more recent prior see guidelines below.  2.  Hepatic steatosis.    CT sinuses (November 2019):  - images directly reviewed, formal interpretation follows:  FINDINGS:      FRONTAL SINUSES:  Normal.     ETHMOID SINUSES:  Normal.     SPHENOID SINUSES: Normal.      MAXILLARY SINUSES: Normal right maxillary sinus. Atelectatic left maxillary sinus. The floors of the maxillary sinuses are intact.     NASAL CAVITY/SKULL BASE: Leftward nasal septal deviation with an associated osseous spur. Unremarkable nasal turbinates. The cribriform plate is intact and symmetric. The anterior clinoid processes are not pneumatized.     PARANASAL SINUS DRAINAGE PATHWAYS:  Opacified left infundibulum. The paranasal sinus drainage pathways are otherwise patent.     NON-SINUS STRUCTURES: No abnormality of the visualized orbits or intracranial compartment.     IMPRESSION:   1.  Atelectatic left maxillary sinus.  2.  The paranasal sinuses are otherwise clear.  3.  Leftward nasal septal deviation and associated osseous spur.    Iván Bear MD  Pulmonary and Critical Care Medicine  [facility]  Cell [phone]  Office [phone]  Pager [phone]

## 2021-06-20 NOTE — PROGRESS NOTES
10-1-18  Called and spoke to patient. Explained reasons for the call and explained Clinic Care Coordination.  Patient stated she only needs help to get a scooter nothing else and does not want to go into personal information or mental health.  Patient stated she has learning disability and expressed her thoughts and feelings that she does not like to be talk to like a child.   Patient would like to meet with  first to see how she talks to her before she decides to have help from  or enroll in CCC. Patient stated she could just to talk Care Guide over the phone about the scooter and not meet with SW.   Re explained CCC and Care Guide and 's roles.  Stated she has a . Asked patient if she has CADI .   Patient stated she will see her CADI  this Thursday 10-4-18.  Suggested patient to talk to her CADI  about helping her to get the scooter.  Offered to mail CCC brochure and to have her review and discuss with CADI  this Thursday if she wants to enroll in CCC.  Patient okay and would like that.     Thanked patient for her time.  Patient undecided at this time.  Reach out next week 10-10-18 of decision.

## 2021-06-20 NOTE — LETTER
Letter by Giselle Munoz MD at      Author: Giselle Munoz MD Service: -- Author Type: --    Filed:  Encounter Date: 7/29/2020 Status: (Other)         Marioal Rdz  [address] [place] [address]             July 29, 2020         Dear Ms. Rdz,    Below are the results from your recent visit:    Resulted Orders   HM2(CBC w/o Differential)   Result Value Ref Range    WBC 9.6 4.0 - 11.0 thou/uL    RBC 4.73 3.80 - 5.40 mill/uL    Hemoglobin 10.7 (L) 12.0 - 16.0 g/dL    Hematocrit 33.4 (L) 35.0 - 47.0 %    MCV 71 (L) 80 - 100 fL    MCH 22.6 (L) 27.0 - 34.0 pg    MCHC 32.0 32.0 - 36.0 g/dL    RDW 16.4 (H) 11.0 - 14.5 %    Platelets 517 (H) 140 - 440 thou/uL    MPV 7.8 7.0 - 10.0 fL   Ferritin   Result Value Ref Range    Ferritin 4 (L) 10 - 130 ng/mL   Comprehensive Metabolic Panel   Result Value Ref Range    Sodium 137 136 - 145 mmol/L    Potassium 4.4 3.5 - 5.0 mmol/L    Chloride 102 98 - 107 mmol/L    CO2 24 22 - 31 mmol/L    Anion Gap, Calculation 11 5 - 18 mmol/L    Glucose 105 70 - 125 mg/dL    BUN 10 8 - 22 mg/dL    Creatinine 0.64 0.60 - 1.10 mg/dL    GFR MDRD Af Amer >60 >60 mL/min/1.73m2    GFR MDRD Non Af Amer >60 >60 mL/min/1.73m2    Bilirubin, Total 0.8 0.0 - 1.0 mg/dL    Calcium 8.7 8.5 - 10.5 mg/dL    Protein, Total 7.5 6.0 - 8.0 g/dL    Albumin 3.1 (L) 3.5 - 5.0 g/dL    Alkaline Phosphatase 79 45 - 120 U/L    AST 13 0 - 40 U/L    ALT 12 0 - 45 U/L    Narrative    Fasting Glucose reference range is 70-99 mg/dL per  American Diabetes Association (ADA) guidelines.   Glycosylated Hemoglobin A1c   Result Value Ref Range    Hemoglobin A1c 7.9 (H) 3.5 - 6.0 %   Vitamin D, Total (25-Hydroxy)   Result Value Ref Range    Vitamin D, Total (25-Hydroxy) 20.2 (L) 30.0 - 80.0 ng/mL    Narrative    Deficiency <10.0 ng/mL  Insufficiency 10.0-29.9 ng/mL  Sufficiency 30.0-80.0 ng/mL  Toxicity (possible) >100.0 ng/mL        Your iron is still low- you should take your iron pill every day.  Your vitamin D is also low.  You should  take your vitamin D 1 or 2 days every week. Your diabetes is doing better. Your kidney and liver tests  are healthy.     Please call with questions or contact us using [employer].    Sincerely,        Electronically signed by Giselle Munoz MD

## 2021-06-20 NOTE — LETTER
Letter by Iván Bear MD at      Author: Iván Bear MD Service: -- Author Type: --    Filed:  Encounter Date: 2/27/2020 Status: (Other)         Giselle Munoz MD  [address] [place] [id number]                                  February 27, 2020    Patient: Mariola Rdz   MR Number: [id number]   YOB: 1975   Date of Visit: 2/27/2020     Dear Dr. Alexander MD; Dr. Camron MD; and Ines Chan CNP:    I saw Mariola Rdz today at the [facility]. Below are the relevant portions of my assessment and plan of care.    If you have questions, please do not hesitate to call me.    Sincerely,        Iván Bear MD            MD Ines Naqvi, Iván Kim MD  2/27/2020  9:29 PM  Sign when Signing Visit  Pulmonary Clinic Follow-up Visit    Assessment and Plan:   44 year old female never smoker with a history of severe obesity, urticaria, RLS, osteoarthritis, dyslipidemia, H pylori gastritis, DM2, diabetic neuropathy, costochondritis, GERD, iron deficiency anemia, depression, anxiety, seasonal allergies, venous insufficiency, back pain, chronic nausea, diabetic gastroparesis, and chronic cough, presenting for follow-up.     Chronic cough, chronic nausea: Primarily severe nausea and emesis, with some associated cough. I advised her to have her dulaglutide stopped, which commonly causes nausea; her endocrinologist has done so, with significant improvement. I also sent her for gastric emptying study, which found diabetic gastroparesis; I sent her to GI and she started promethazine, which is aslo helping. She has found benefit from cetirizine and montelukast, which she continues. On omeprazole 40 mg daily. Would not start gabapentin at this point, as she is under adequate control.     Plan:  - continue promethazine for diabetic gastroparesis as diagnosed with GI  - stays off dulaglutide  - nausea and vomiting, primarily due to  dulaglutide and diabetic gastroparesis, have been her primary issue, which should be managed by her PCP, GI, and endocrinology  - continue cetirizine and montelukast  - continue omeprazole 40 mg daily  - follow up in pulmonary clinic as needed     I appreciate the opportunity to participate in the care of Ms. Flynn. Please feel free to contact me at any time.    Iván Bear MD  Pulmonary and Critical Care Medicine  [facility]  Cell [phone]  Office [phone]  Pager [phone]    CCx: chronic nausea and vomiting, chronic cough    HPI: 44 year old female never smoker with a history of severe obesity, urticaria, RLS, osteoarthritis, dyslipidemia, H pylori gastritis, DM2, diabetic neuropathy, costochondritis, GERD, iron deficiency anemia, depression, anxiety, seasonal allergies, venous insufficiency, back pain, chronic nausea, diabetic gastroparesis, and chronic cough, presenting for follow-up. Primarily severe nausea and emesis, with some associated cough. I advised her to have her dulaglutide stopped, which commonly causes nausea; her endocrinologist has done so, with significant improvement. I also sent her for gastric emptying study, which found diabetic gastroparesis; I sent her to GI and she started promethazine, which is aslo helping. She has found benefit from cetirizine and montelukast, which she continues. On omeprazole 40 mg daily. Would not start gabapentin at this point, as she is under adequate control.    ROS:  A 12-system review was obtained and was negative with the exception of the symptoms endorsed in the history of present illness.    PMH:  BMI 59.1  Urticaria  RLS  Knee OA  DL  H pylori gastritis  DM2  DKA 2015  Diabetic neuropathy  Costochondritis  GERD  ALDEN  Seasonal allergies  Venous stasis  Plantar fasciitis  Low back pain  Vitamin D deficiency  MDD/JOSE MARIA  Diabetic gastroparesis    PSH:  Past Surgical History:   Procedure Laterality Date   ?   SECTION  2017   ? INSERT MIDLINE HE  2015        ? [place]  DELIVERY ONLY      Description:  Section;  Proc Date: 2001;  Comments: fetal distress   ? [place] COLONOSCOPY FLX DX W/COLLJ SPEC WHEN PFRMD N/A 2019    Procedure: COLONOSCOPY;  Surgeon: Frankie Maher MD;  Location: [facility];  Service: Gastroenterology   ? [place] ESOPHAGOGASTRODUODENOSCOPY TRANSORAL DIAGNOSTIC N/A 2019    Procedure: ESOPHAGOGASTRODUODENOSCOPY (EGD);  Surgeon: Frankie Maher MD;  Location: [facility] OR;  Service: Gastroenterology   ? [place] OPEN TX TRIMALLEOLAR ANKLE FX W/O FIX PST LIP Right     Description: Open Treatment Of Trimalleolar Ankle Fracture;  Proc Date: 2009;  Comments: complicated by post-op infections   ? [place] REMOVAL GALLBLADDER      Description: Cholecystectomy;  Recorded: 2009;   ? pulley release Left     index   ? US BREAST CORE BIOPSY LEFT Left 2018       Allergies:  Allergies   Allergen Reactions   ? Vicks Vaporub [Camphor-Eucalyptus Oil-Menthol] Hives   ? Amoxicillin Nausea And Vomiting   ? Codeine Nausea Only and Hives   ? Cranberry Diarrhea   ? Menthol    ? Penicillins Nausea And Vomiting   ? Permethrin Itching   ? Denver Rash       Family HX:  Family History   Problem Relation Age of Onset   ? No Medical Problems Sister    ? Asthma Brother    ? Emphysema Brother    ? No Medical Problems Sister    ? Cancer Mother    ? Diabetes Mother        Social Hx:  Social History     Socioeconomic History   ? Marital status: Single     Spouse name: Not on file   ? Number of children: Not on file   ? Years of education: Not on file   ? Highest education level: Not on file   Occupational History   ? Occupation: Disability for mental health   Social Needs   ? Financial resource strain: Not on file   ? Food insecurity:     Worry: Not on file     Inability: Not on file   ? Transportation needs:     Medical: Not on file     Non-medical: Not on file   Tobacco Use   ?  "Smoking status: Never Smoker   ? Smokeless tobacco: Never Used   ? Tobacco comment: family members smoke outside    Substance and Sexual Activity   ? Alcohol use: No   ? Drug use: No   ? Sexual activity: Yes     Partners: Male     Birth control/protection: Condom   Lifestyle   ? Physical activity:     Days per week: Not on file     Minutes per session: Not on file   ? Stress: Not on file   Relationships   ? Social connections:     Talks on phone: Not on file     Gets together: Not on file     Attends Anabaptist service: Not on file     Active member of club or organization: Not on file     Attends meetings of clubs or organizations: Not on file     Relationship status: Not on file   ? Intimate partner violence:     Fear of current or ex partner: Not on file     Emotionally abused: Not on file     Physically abused: Not on file     Forced sexual activity: Not on file   Other Topics Concern   ? Not on file   Social History Narrative    Lives with benjaminGeoff, and 2 roomates    Son has been adopted out       Current Meds:  Current Outpatient Medications   Medication Sig Dispense Refill   ? promethazine (PHENERGAN) 12.5 MG tablet Take 12.5 mg by mouth 2 (two) times a day as needed (as needed prior to meals for nausea).     ? BD ULTRA-FINE MILLI PEN NEEDLE 32 gauge x 5/32\" Ndle USE THREE TIMES DAILY 300 each 0   ? blood glucose meter (GLUCOMETER) Dispense meter covered by pt ins.     ? BLOOD SUGAR DIAGNOSTIC (TRUETEST TEST STRIPS MISC) Use As Directed. Test 3 times daily     ? buPROPion (WELLBUTRIN XL) 150 MG 24 hr tablet Take 1 tablet (150 mg total) by mouth daily. 90 tablet 1   ? cetirizine-pseudoephedrine (ZYRTEC-D) 5-120 mg per tablet Take 1 tablet by mouth 2 (two) times a day. 60 tablet 5   ? RONAN/IRON chewable tablet CSW 1 T PO D  7   ? CONTOUR NEXT TEST STRIPS strips TEST TID  3   ? ergocalciferol (ERGOCALCIFEROL) 50,000 unit capsule Take 1 capsule (50,000 Units total) by mouth once a week. 12 capsule 4   ? " "fluocinonide (LIDEX) 0.05 % cream ALBAN  BID      PRF  ITCHY  SPOTS    FOR  LESS  THAN  2  WEEKS  3   ? fluticasone (VERAMYST) 27.5 mcg/actuation nasal spray One spray in each nostril daily 10 g 11   ? GAVILYTE-G 236-22.74-6.74 -5.86 gram solution      ? glipiZIDE (GLUCOTROL XL) 10 MG 24 hr tablet Take 1 tablet by mouth daily before breakfast  3   ? hydrOXYzine HCl (ATARAX) 50 MG tablet TAKE 1 TO 2 TABLETS BY MOUTH EVERY 6 HOURS AS NEEDED FOR ITCHING 120 tablet 11   ? ibuprofen (ADVIL,MOTRIN) 600 MG tablet TAKE 1 TABLET BY MOUTH EVERY DAY WITH FOOD AS NEEDED FOR PAIN 90 tablet 0   ? ibuprofen (ADVIL,MOTRIN) 800 MG tablet Take 1 tablet (800 mg total) by mouth daily as needed for pain. 30 tablet 3   ? insulin glargine (BASAGLAR KWIKPEN U-100 INSULIN) 100 unit/mL (3 mL) pen Inject 50 Units under the skin at bedtime.            ? lancets (TRUEPLUS LANCETS) 33 gauge Misc Use As Directed. Check BG 3 tmes daily     ? metFORMIN (GLUCOPHAGE) 1000 MG tablet TAKE 1 TABLET(1000 MG) BY MOUTH TWICE DAILY WITH MEALS 180 tablet 3   ? metoclopramide (REGLAN) 5 MG tablet Take 1 tablet (5 mg total) by mouth daily as needed for nausea. 30 tablet 1   ? miscellaneous medical supply Misc Compression Stockings- custom knee high with zipper 4 each 0   ? montelukast (SINGULAIR) 10 mg tablet TAKE 1 TABLET(10 MG) BY MOUTH DAILY AT BEDTIME AS DIRECTED 90 tablet 4   ? NOVOFINE PLUS 32 gauge x 1/6\" Ndle Inject 1 each under the skin daily. With basaglar insulin     ? omeprazole (PRILOSEC) 40 MG capsule Take 1 capsule (40 mg total) by mouth daily before breakfast. 90 capsule 1   ? ONETOUCH VERIO FLEX Misc TEST  TID  0   ? pediatric multivit-iron-min Chew Chew 1 tablet daily. 100 each 4   ? pioglitazone (ACTOS) 30 MG tablet Take 1 tablet by mouth daily  0   ? pramipexole (MIRAPEX) 0.5 MG tablet   3   ? sertraline (ZOLOFT) 100 MG tablet TAKE 2 TABLETS BY MOUTH DAILY 180 tablet 3   ? traMADol (ULTRAM) 50 mg tablet Take 1 tablet (50 mg total) by mouth " "daily as needed for pain. 15 tablet 0   ? TRULICITY 1.5 mg/0.5 mL PnIj INJECT 1.5 MG SUBCUTANEOUS ONCE A WEEK  1     No current facility-administered medications for this visit.        Physical Exam:  /82   Pulse 85   Ht 5' 2\" (1.575 m)   SpO2 98%   Breastfeeding No   BMI 59.08 kg/m     Gen: alert, oriented, no distress, obese in a motorized wheelchair  HEENT: nasal turbinates are unremarkable, no oropharyngeal lesions, no cervical or supraclavicular lymphadenopathy  CV: RRR, no M/G/R  Resp: CTAB, no focal crackles or wheezes  Abd: soft, nontender, no palpable organomegaly  Skin: no apparent rashes  Ext: no cyanosis, clubbing or edema  Neuro: alert, nonfocal    Labs:  reviewed    Imaging studies:  CT chest with contrast (June 2019):  - images directly reviewed, formal interpretation follows:  FINDINGS:   LUNGS AND PLEURA: Multiple 3 mm and smaller pulmonary and pleural-based nodules new from prior. No focal infiltrate or effusion.     MEDIASTINUM: No mass or adenopathy.     LIMITED UPPER ABDOMEN: Hepatic steatosis. Cholecystectomy.     MUSCULOSKELETAL: Degenerative disease. Old left third rib fracture.     IMPRESSION:   CONCLUSION:   1.  Multiple 3 mm and smaller pulmonary and pleural based nodules likely benign though technically indeterminate. If no more recent prior see guidelines below.  2.  Hepatic steatosis.     CT sinuses (November 2019):  - images directly reviewed, formal interpretation follows:  FINDINGS:      FRONTAL SINUSES:  Normal.     ETHMOID SINUSES:  Normal.     SPHENOID SINUSES: Normal.      MAXILLARY SINUSES: Normal right maxillary sinus. Atelectatic left maxillary sinus. The floors of the maxillary sinuses are intact.     NASAL CAVITY/SKULL BASE: Leftward nasal septal deviation with an associated osseous spur. Unremarkable nasal turbinates. The cribriform plate is intact and symmetric. The anterior clinoid processes are not pneumatized.     PARANASAL SINUS DRAINAGE PATHWAYS:  Opacified " left infundibulum. The paranasal sinus drainage pathways are otherwise patent.     NON-SINUS STRUCTURES: No abnormality of the visualized orbits or intracranial compartment.     IMPRESSION:   1.  Atelectatic left maxillary sinus.  2.  The paranasal sinuses are otherwise clear.  3.  Leftward nasal septal deviation and associated osseous spur.

## 2021-06-20 NOTE — PROGRESS NOTES
[facility] Daily Progress     Patient Name: Mariola Rdz  Date: 10/11/2018  Visit #: 4/16  PTA visit #:  0  Referral Diagnosis:   Closed fibular fracture [S82.409A]  - Primary       Plantar fasciitis of right foot [M72.2]       Referring provider: Giselle Munoz MD  Visit Diagnosis:     ICD-10-CM    1. Plantar fasciitis M72.2    2. Closed fracture of distal end of left fibula, unspecified fracture morphology, initial encounter S82.832A    3. Generalized muscle weakness M62.81    4. Unsteady gait R26.81          Assessment:   Pt reports mild decrease in pain when ambulating from bedroom to bathroom.  Pt spend the majority of her day in bed and reports she is not active around the house.  Has difficulty with stretching and strengthening in a standing position due to foot pain.      Patient demonstrates understanding/independence with home program.  Patient is benefitting from skilled physical therapy and is making steady progress toward functional goals.  Patient is appropriate to continue with skilled physical therapy intervention, as indicated by initial plan of care.     From Eval: Pt is a 43 y.o. year old female with R foot pain and L closed fibular fracture.  Patient has difficulty with all functional mobility due to R heel pain.  Findings are consistent with plantar fasciitis on R foot.  Pt reports a history of R plantar fasciitis that would come and go but only last a few day. Pt has a history of R ankle surgery and recent L fibular fracture.    Goal Status:  Pt. will demonstrate/verbalize independence in self-management of condition in : 4 weeks  Pt. will be independent with home exercise program in : 4 weeks  Pt. will report decreased intensity, frequency of : Pain;in 12 weeks;Comment  Comment:: in R foot   Pt. will have improved quality of sleep: with less pain;waking less times/night;in 12 weeks  Patient will stand : 10 minutes;with less pain;with less difficultty;for home chores;in  12 weeks  Pt will: increase score on LEFS by >10 points to demonstrate increased functional mobility in 12 weeks.   Pt will: walk with pain below 3/10 for household mobility in 12 weeks.        5/30/18 FINDINGS: Nondisplaced transverse fracture distal tip left fibula. No additional fractures identified. Left ankle in normal alignment. Marked soft tissue swelling about the left ankle. Left ankle effusion. Arterial calcifications. Plantar calcaneal spur.      9/18/18 FINDINGS: No change in alignment distal fibular fracture there has been some interval callus formation consistent with early healing. No new fracture. No dislocation. There is a plantar calcaneal spur.  Plan / Patient Education:     Continue with initial plan of care.  Progress with home program as tolerated.     Plan for next session: review HEP from last session   Continue ankle strengthening   R foot stretching  xarthritis    Subjective:     Pt reports mild decrease in pain.  Not wearing L ankle brace, PCA is unable to put brace on and put shoe on.       Objective:     Treatment Today    Exercises:  Exercise #1: Stretching: DF stretch with purple theraband in long sit , gastroc/soleus stretch   Comment #1: ankle strengthening: circles, DF/PF, alphabet  Exercise #2: PF in long sit with yellow band  Comment #2: Seated exercises: HS stretch, marching, heel raises   Exercise #3: plantar fasica stretch hold 30 sec   Comment #3: SLR x10   Exercise #4: supine abduction x10   Comment #4: heel raises x10      TREATMENT MINUTES COMMENTS   Evaluation     Self-care/ Home management   Discussed importance of exercises up to 150 min a week.  Ambulating, stretching and strengthening.    Manual therapy 2 STM to R plantar fascia, PROM stretching into DF    Neuromuscular Re-education     Therapeutic Activity     Therapeutic Exercises 28 See ex's flow sheet    Gait training     Modality___                 Total 30    Blank areas are intentional and mean the treatment did  not include these items.       Kathy Goncalves, PT, DPT  10/11/2018

## 2021-06-20 NOTE — LETTER
Letter by Giselle Munoz MD at      Author: Giselle Munoz MD Service: -- Author Type: --    Filed:  Encounter Date: 3/20/2020 Status: (Other)       20    RE: Mariola Rdz  : 1975    Order for:    Nutritional shake- diabetic/low carb with fiber three times a day   #90/month    Dx:  Diabetic gastroparesis        Giselle Munoz MD

## 2021-06-20 NOTE — LETTER
Letter by Iván Bear MD at      Author: Iván Bear MD Service: -- Author Type: --    Filed:  Encounter Date: 2020 Status: (Other)       2020    Dear Cheryl Munoz and Camron,    See below for documentation of a telephone conversation that I had today with Mariola Rdz ( 1975). Please feel free to call me at any time if needed.    Pulmonary Telephone Note    Esophagram with limited views due to patient inability to stand but overall normal. Gastric emptying study showed delayed gastric emptying at 4 hours and probable reflux. In the context of the patient's persistent emesis, it may be reasonable to have her seen by gastroenterology for diabetic gastroparesis. I am not convinced that her emesis is directly connected to her cough. Also advised her to discuss dulaglutide with her endocrinologist; I have less experience with this drug, though reports show that nausea is a very common side effect, in 12-21% of patients.    Have tried multiple empiric therapies for her cough, and if adding OTC nasal steroid to her other therapies is ineffective, should consider gabapentin for refractory idiopathic chronic cough. I will be seeing her in follow-up at the end of the month. She is in agreement with the above.    Sincerely,    Iván Bear MD  Pulmonary and Critical Care Medicine  [facility]  Cell [phone]  Office [phone]  Pager [phone]

## 2021-06-20 NOTE — PROGRESS NOTES
[facility] Daily Progress     Patient Name: Mariola Rdz  Date: 10/9/2018  Visit #: 3/16  PTA visit #:  0  Referral Diagnosis:   Closed fibular fracture [S82.409A]  - Primary       Plantar fasciitis of right foot [M72.2]       Referring provider: Giselle Munoz MD  Visit Diagnosis:     ICD-10-CM    1. Plantar fasciitis M72.2    2. Closed fracture of distal end of left fibula, unspecified fracture morphology, initial encounter S82.832A    3. Generalized muscle weakness M62.81    4. Unsteady gait R26.81          Assessment:   Pt reports mild decrease in pain when ambulating from bedroom to bathroom.  Pt spend the majority of her day in bed and reports she is not active around the house.  Has difficulty with stretching and strengthening in a standing position due to foot pain.      Patient demonstrates understanding/independence with home program.  Patient is benefitting from skilled physical therapy and is making steady progress toward functional goals.  Patient is appropriate to continue with skilled physical therapy intervention, as indicated by initial plan of care.     From Eval: Pt is a 43 y.o. year old female with R foot pain and L closed fibular fracture.  Patient has difficulty with all functional mobility due to R heel pain.  Findings are consistent with plantar fasciitis on R foot.  Pt reports a history of R plantar fasciitis that would come and go but only last a few day. Pt has a history of R ankle surgery and recent L fibular fracture.    Goal Status:  Pt. will demonstrate/verbalize independence in self-management of condition in : 4 weeks  Pt. will be independent with home exercise program in : 4 weeks  Pt. will report decreased intensity, frequency of : Pain;in 12 weeks;Comment  Comment:: in R foot   Pt. will have improved quality of sleep: with less pain;waking less times/night;in 12 weeks  Patient will stand : 10 minutes;with less pain;with less difficultty;for home chores;in  12 weeks  Pt will: increase score on LEFS by >10 points to demonstrate increased functional mobility in 12 weeks.   Pt will: walk with pain below 3/10 for household mobility in 12 weeks.        5/30/18 FINDINGS: Nondisplaced transverse fracture distal tip left fibula. No additional fractures identified. Left ankle in normal alignment. Marked soft tissue swelling about the left ankle. Left ankle effusion. Arterial calcifications. Plantar calcaneal spur.      9/18/18 FINDINGS: No change in alignment distal fibular fracture there has been some interval callus formation consistent with early healing. No new fracture. No dislocation. There is a plantar calcaneal spur.  Plan / Patient Education:     Continue with initial plan of care.  Progress with home program as tolerated.     Plan for next session: review HEP from last session   Continue ankle strengthening   R foot stretching  PF in sitting and standing  Seated ex's: marching abduction with theraband     Subjective:     Pt reports mild decrease in pain.  Not wearing L ankle brace, PCA is unable to put brace on and put shoe on.       Objective:     Treatment Today      Exercises:  Exercise #1: Stretching: DF stretch with purple theraband in long sit , gastroc/soleus stretch   Comment #1: ankle strengthening: circles, DF/PF, alphabet  Exercise #2: PF in long sit with yellow band  Exercise #3: plantar fasica stretch hold 30 sec      TREATMENT MINUTES COMMENTS   Evaluation     Self-care/ Home management  Continued to discuss importance of icing daily, stretching R foot before getting out of bed in the morning. Discussed importance of ambulation when pain in feet decrease.  Example: walking around store instead of using motorized cart. Trialed use of brace on L foot, unable to apply and wear don shoes.    Manual therapy 20 STM to R plantar fascia, PROM stretching into DF    Neuromuscular Re-education     Therapeutic Activity     Therapeutic Exercises 25 See ex's flow  sheet - gave pt HEP: stretching and strengthening foot/ankle for plantar fasciitis    Gait training     Modality___                 Total 45    Blank areas are intentional and mean the treatment did not include these items.       Kathy Goncalves, PT, DPT  10/9/2018

## 2021-06-20 NOTE — PROGRESS NOTES
[facility] Daily Progress     Patient Name: Mariola Rdz  Date: 10/2/2018  Visit #: 2/16  PTA visit #:  0  Referral Diagnosis:   Closed fibular fracture [S82.409A]  - Primary       Plantar fasciitis of right foot [M72.2]       Referring provider: Giselle Munoz MD  Visit Diagnosis:     ICD-10-CM    1. Plantar fasciitis M72.2    2. Closed fracture of distal end of left fibula, unspecified fracture morphology, initial encounter S82.832A    3. Generalized muscle weakness M62.81    4. Unsteady gait R26.81          Assessment:     Patient demonstrates understanding/independence with home program.  Patient is benefitting from skilled physical therapy and is making steady progress toward functional goals.  Patient is appropriate to continue with skilled physical therapy intervention, as indicated by initial plan of care.     From Eval: Pt is a 43 y.o. year old female with R foot pain and L closed fibular fracture.  Patient has difficulty with all functional mobility due to R heel pain.  Findings are consistent with plantar fasciitis on R foot.  Pt reports a history of R plantar fasciitis that would come and go but only last a few day. Pt has a history of R ankle surgery and recent L fibular fracture.    Goal Status:  Pt. will demonstrate/verbalize independence in self-management of condition in : 4 weeks  Pt. will be independent with home exercise program in : 4 weeks  Pt. will report decreased intensity, frequency of : Pain;in 12 weeks;Comment  Comment:: in R foot   Pt. will have improved quality of sleep: with less pain;waking less times/night;in 12 weeks  Patient will stand : 10 minutes;with less pain;with less difficultty;for home chores;in 12 weeks  Pt will: increase score on LEFS by >10 points to demonstrate increased functional mobility in 12 weeks.   Pt will: walk with pain below 3/10 for household mobility in 12 weeks.        5/30/18 FINDINGS: Nondisplaced transverse fracture distal tip  left fibula. No additional fractures identified. Left ankle in normal alignment. Marked soft tissue swelling about the left ankle. Left ankle effusion. Arterial calcifications. Plantar calcaneal spur.      9/18/18 FINDINGS: No change in alignment distal fibular fracture there has been some interval callus formation consistent with early healing. No new fracture. No dislocation. There is a plantar calcaneal spur.  Plan / Patient Education:     Continue with initial plan of care.  Progress with home program as tolerated.     Plan for next session: review HEP from last session   Continue ankle strengthening   R foot stretching    Subjective:     Pt reports no change in pain.  Not wearing L ankle brace, PCA is unable to put brace on and put shoe on.       Objective:     Treatment Today      Exercises:  Exercise #1: Stretching: DF stretch, gastroc/soleus stretch   Comment #1: ankle strengthening: circles, DF/PF, alphabet     TREATMENT MINUTES COMMENTS   Evaluation     Self-care/ Home management 8 Continued to discuss importance of icing daily, stretching R foot before getting out of bed in the morning.    Manual therapy 20 STM to R plantar fascia, PROM stretching into DF    Neuromuscular Re-education     Therapeutic Activity     Therapeutic Exercises 16 See ex's flow sheet    Gait training     Modality____________ultrasound______ 11 min plus 3 min set up 1MHz   1.0 power   Continuous   Plantar fascia attachment - very light pressure               Total 58    Blank areas are intentional and mean the treatment did not include these items.       Kathy Goncalves, PT, DPT  10/2/2018

## 2021-06-20 NOTE — PROGRESS NOTES
[facility] Certification Request    September 27, 2018      Patient: Mariola Rdz  MR Number: [id number]  YOB: 1975  Date of Visit: 9/26/2018      Dear Dr. Munoz:    Thank you for this referral.   We are seeing Mariola Rdz for Physical Therapy of R foot pain and L closed fibular fracture.    Medicare and/or Medicaid requires physician review and approval of the treatment plan. Please review the plan of care and verify that you agree with the therapy plan of care by co-signing this note.      Plan of Care  Authorization / Certification Start Date: 09/26/18  Authorization / Certification End Date: 12/20/18  Authorization / Certification Number of Visits: 12-16  Communication with: Referral Source  Patient Related Instruction: Nature of Condition;Treatment plan and rationale;Self Care instruction;Basis of treatment;Next steps;Expected outcome  Times per Week: 1-2  Number of Weeks: 8-12  Number of Visits: 12-16  Discharge Planning: independent with HEP and self-managment of symptoms   Therapeutic Exercise: ROM;Stretching;Strengthening  Neuromuscular Reeducation: kinesio tape;posture;balance/proprioception  Manual Therapy: soft tissue mobilization;myofascial release;joint mobilization;muscle energy;craniosacral therapy  Modalities: electrical stimulation;TENS;ultrasound;cold pack;hot pack  Equipment: theraband    Goals:  Pt. will demonstrate/verbalize independence in self-management of condition in : 4 weeks  Pt. will be independent with home exercise program in : 4 weeks  Pt. will report decreased intensity, frequency of : Pain;in 12 weeks;Comment  Comment:: in R foot   Pt. will have improved quality of sleep: with less pain;waking less times/night;in 12 weeks  Patient will stand : 10 minutes;with less pain;with less difficultty;for home chores;in 12 weeks  Pt will: increase score on LEFS by >10 points to demonstrate increased functional mobility in 12 weeks.   Pt will: walk  with pain below 3/10 for household mobility in 12 weeks.       If you have any questions or concerns, please don't hesitate to call.    Sincerely,      Kathy Goncalves, PT, DPT        Physician recommendation:     ___ Follow therapist's recommendation        ___ Modify therapy      *Physician co-signature indicates they certify the need for these services furnished within this plan and while under their care.      [facility]   Foot/Ankle Initial Evaluation    Patient Name: Mariola Rdz  Date of evaluation: 9/27/2018  Referral Diagnosis:   Closed fibular fracture [S82.409A]  - Primary       Plantar fasciitis of right foot [M72.2]       Referring provider: Giselle Munoz MD  Visit Diagnosis:     ICD-10-CM    1. Plantar fasciitis M72.2    2. Closed fibular fracture S82.409A    3. Generalized muscle weakness M62.81    4. Unsteady gait R26.81      Precautions: uncontrolled type 2 diabetes, obesity, diabetic polyneuropathy, mild disability     Assessment:   Pt is a 43 y.o. year old female with R foot pain and L closed fibular fracture.  Patient has difficulty with all functional mobility due to R heel pain.  Findings are consistent with plantar fasciitis on R foot.  Pt reports a history of R plantar fasciitis that would come and go but only last a few day. Pt has a history of R ankle surgery and recent L fibular fracture. Patient appears motivated to participate in Physical Therapy and present with a good Physical Therapy prognosis for resolution of activities limitations.     5/30/18 FINDINGS: Nondisplaced transverse fracture distal tip left fibula. No additional fractures identified. Left ankle in normal alignment. Marked soft tissue swelling about the left ankle. Left ankle effusion. Arterial calcifications. Plantar calcaneal spur.     9/18/18 FINDINGS: No change in alignment distal fibular fracture there has been some interval callus formation consistent with early healing. No new fracture.  No dislocation. There is a plantar calcaneal spur.     Pt. is appropriate for skilled PT intervention as outlined in the Plan of Care (POC).  Pt. is a good candidate for skilled PT services to improve pain levels and function.    Goals:  Pt. will demonstrate/verbalize independence in self-management of condition in : 4 weeks  Pt. will be independent with home exercise program in : 4 weeks  Pt. will report decreased intensity, frequency of : Pain;in 12 weeks;Comment  Comment:: in R foot   Pt. will have improved quality of sleep: with less pain;waking less times/night;in 12 weeks  Patient will stand : 10 minutes;with less pain;with less difficultty;for home chores;in 12 weeks  Pt will: increase score on LEFS by >10 points to demonstrate increased functional mobility in 12 weeks.   Pt will: walk with pain below 3/10 for household mobility in 12 weeks.     Barriers to Learning or Achieving Goals:  Financial situation.  Unable to purchase brace that MD ordered  Mental illness or emotional factors.  mild disability as reported by MD    Patient's expectations/goals are realistic.       Patient educated on and demonstrated understanding of nature of impairment, plan of care, patient role and HEP. Patient compliant with PT and prognosis is good. Patient would benefit from skilled PT to progress and improve range of motion, flexibility and tissue extensibility, joint mobility and pain.       Plan / Patient Instructions:        Plan of Care:   Authorization / Certification Start Date: 09/26/18  Authorization / Certification End Date: 12/20/18  Authorization / Certification Number of Visits: 12-16  Communication with: Referral Source  Patient Related Instruction: Nature of Condition;Treatment plan and rationale;Self Care instruction;Basis of treatment;Next steps;Expected outcome  Times per Week: 1-2  Number of Weeks: 8-12  Number of Visits: 12-16  Discharge Planning: independent with HEP and self-managment of symptoms    Therapeutic Exercise: ROM;Stretching;Strengthening  Neuromuscular Reeducation: kinesio tape;posture;balance/proprioception  Manual Therapy: soft tissue mobilization;myofascial release;joint mobilization;muscle energy;craniosacral therapy  Modalities: electrical stimulation;TENS;ultrasound;cold pack;hot pack  Equipment: theraband    Plan for next visit: ultrasound with stretching if helpful after last session, STM, give pt stretches (gastroc, soleus),  Assess heel raises      Subjective:         Social information:   Living Situation:apartment - one step - no bottom floor   Occupation:not employeed   Work Status:On permanent disability since age 16   Equipment Available: None and walker 4WW since 2009    History of Present Illness:    Mariola is a 43 y.o. female who presents to therapy today with complaints of right foot pain.  May 24th, 2018 pt reports L fibular fracture.  Date of onset/duration of symptoms is 2 weeks ago. Onset was sudden after walking a long distance. Symptoms are getting worse. She reports  an episodic  history of similar symptoms. She describes their previous level of function as limited with ambulating.  Ankle surgery 2009.     Pain Rating:10  Pain rating at best: 5  Pain rating at worst: 10  Pain description: sharp stabbing pain     Functional limitations are described as occurring with:   walking increases pain, pain with sitting >10-15 min    Patient reports benefit from:  staying off of foot, elevating    Epson salt bath didn't help     Objective:      Note: Items left blank indicates the item was not performed or not indicated at the time of the evaluation.    Patient Outcome Measures :      Lower Extremity Functional Scale (_/80): 10   Scores range from 0-80, where a score of 80 represents maximum function. The minimal clinically important difference is a positive change of 9 points.    Ankle/Foot Examination  1. Plantar fasciitis     2. Closed fibular fracture     3. Generalized  "muscle weakness     4. Unsteady gait       Involved Side: Right  Posture Observation:      General sitting posture is  fair.  Assistive Device: 4WW  Gait Observation: ambulated with 4WW, able to ambulate 5' without walker but demonstrated antalgic gait and inability to put full weight through R foot     Foot/Ankle ROM:        Date: 9/26/18   Ankle AROM ( )   Right    Left   Right   Left   Right   Left   Dorsiflexion-Gastroc (10 )   Unable to assess due to pain with any degree for DF                  Dorsiflexion-Soleus (20 )                     Plantar Flexion (50 )                     Inversion (45-60 )                     Eversion (15-30 )                     Great Toe Extension (70 )   pain                  Great Toe Flexion (MTP 45 , IP 90 )                     Ankle PROM ( )   Right   Left   Right   Left   Right   Left   Dorsiflexion-Gastroc (10 )                     Dorsiflexion-Soleus (20 )                     Plantar Flexion (50 )                     Inversion (45-60 )                     Eversion (15-30 )                     Great Toe Extension (70 )                     Great Toe Flexion (MTP 45 , IP 90 )                     Unable to assess L ankle ROM due to brace for fibular fracture        Foot/Ankle Strength:         Date:  9/26/18 unable to assess due to pain   Ankle/Foot MMT (/5)   Right   Left   Right   Left   Right   Left   Dorsiflexion         Plantar flexion         Inversion         Eversion         Great Toe Extension           Foot/Ankle Special Tests:    Ligament Tests (+/-)  Right  Left  Fracture Tests (+/-)  Right   Left     Anterior Drawer (ATFL) translate calcaneous ant         [place] Ankle Rule -   -inability to weight bear 4 steps immediately after injury  -tender 6cm superior to lat/med malleoli   -5th metacarpal pain (Hernandez fracture)  -navicular pain           Talar Tilt         [place] Foot Rule          Impingement Tests (+/-)  Right  Left   Heel Tap \"Bump\" - tibial   stress fracture " "     Impingement sign     Squeeze Test - high ankle     Sprain (syndesmodic)      Impingement sign cluster   1. Ant-lat ankle tenderness.   2. Ant-lat ankle swelling.   3. Pain with forced DF and Eversion.   4. Pain with SL squat.   5. Pain with activities.   6. Ankle instability.    (5 or more is positive)      DF & Eversion - high      ankle sprain     ATFL (anterior talofibular ligament) stress - PF & inversion     CFL stress/talar tilt (inversion while holding calcaneus/talus)      Achilles Tests (+/-)  RIght   Left  Plantar Fasciitis Tests (+/-)  Right  Left    Lopez's Calf Squeeze         Windlass (NWB) for plantar fasciitis   +        Arc Sign         Windlass (WB) for plantar fasciitis   +        Dillon Fink Test        Anterior Impingement: DF          Posterior drawer        Kleiger's test - ER test   PF and eversion (deltoid)           Other:        [place] Test:             Palpation:  Pain in R plantar fascia attachment at heel and along plantar fascia, as well as anterior ankle to achilles    Treatment Today     TREATMENT MINUTES COMMENTS   Evaluation 31    Self-care/ Home management 10 Education: ice, stretch in the morning. Anatomy of plantar fascia  Placement of kinesiotape: for flat foot to support plantar [place] shaped (3) 6\" long with 1\" base. I shape tape from medial malleolus to lateral foot    Manual therapy 8  STM plantar fascia - light pressure - gentle mobilization of MTP joints    Neuromuscular Re-education     Therapeutic Activity     Therapeutic Exercises     Gait training     Modality______ultrasound______ 8+set up (11) 1MHz   1.0 power   Continuous   Plantar fascia attachment - very light pressure               Total 53    Blank areas are intentional and mean the treatment did not include these items.     PT Evaluation Code: (Please list factors)  Patient History/Comorbidities: see above  Examination: LE/ankle/foot  Clinical Presentation: stable  Clinical Decision Making: " low    Patient History/  Comorbidities Examination  (body structures and functions, activity limitations, and/or participation restrictions) Clinical Presentation Clinical Decision Making (Complexity)   No documented Comorbidities or personal factors 1-2 Elements Stable and/or uncomplicated Low   1-2 documented comorbidities or personal factor 3 Elements Evolving clinical presentation with changing characteristics Moderate   3-4 documented comorbidities or personal factors 4 or more Unstable and unpredictable High              Kathy Goncalves, PT, DPT  9/27/2018  1:02 PM

## 2021-06-20 NOTE — LETTER
Letter by Giselle Munoz MD at      Author: Giselle Munoz MD Service: -- Author Type: --    Filed:  Encounter Date: 7/29/2020 Status: (Other)       Mariola Rdz  [address] [place] [address]             October 17, 2019

## 2021-06-21 NOTE — PROGRESS NOTES
[facility] Office Visit    Chief Complaint:  Chief Complaint   Patient presents with     ITCHING     PT STATES ALL OVER BODY ITCHING CAUSING SORES FOR 2X WEEKS        Assessment/Plan:  1. Itching  Patient has a history of chronic itching and skin picking.  Likely multifactorial related to body habitus and bathing habits, dry skin with cold winter weather now in place, possibly statin therapy although patient's may not be on this medication after all,, hyperglycemia and dehydration, other medications etc.  No signs of infections or bug bites noted today.  Continue hydroxyzine since diphenhydramine is not covered by insurance.  Aquaphor daily.  Lab work as below to evaluate for underlying liver or metabolic issues or thyroid disorders contributing to her symptoms.  Consider Derm referral if needed.  - hydrOXYzine HCl (ATARAX) 50 MG tablet; 1-2 every 6 hours as needed for itching  Dispense: 120 tablet; Refill: 11  - white petrolatum (AQUAPHOR ORIGINAL) 41 % Oint; Apply to skin daily  Dispense: 396 g; Refill: 11  - Comprehensive Metabolic Panel  - Thyroid Cascade    2. Atypical chest pain  Certainly high risk for coronary vascular disease given her sedentary lifestyle morbid obesity and uncontrolled type 2 diabetes.  EKG reviewed by myself is reassuring we will await official cardiology read.  Chest x-ray also reviewed by myself is grossly normal but will await official read.  D-dimer pending to evaluate for possible PE due to her high risk status with her recent ankle fracture and immobility in general orbit obesity etc.  Troponin to evaluate for underlying heart attack.  Attempt to to evaluate for underlying lung infection or anemia.  CMP to evaluate for underlying GI or liver issues contributing to her symptoms.  - Electrocardiogram Perform - Clinic  - XR Chest 2 Views; Future  - D-dimer, Quantitative  - Troponin I  - HM2(CBC w/o Differential)  - Comprehensive Metabolic Panel    3.  "Hypomagnesemia  Patient is encouraged to continue her magnesium supplement at least every other day or a couple of days a week.    4. Encounter for screening mammogram for malignant neoplasm of breast  - Mammo Screening Bilateral; Future      Return in about 1 week (around 11/6/2018) for Recheck.  The following high BMI interventions were performed this visit: encouragement to exercise and lifestyle education regarding diet    Patient Education/AVS:  There are no Patient Instructions on file for this visit.    HPI:   Mariola Rdz is a 43 y.o. female c/o f/u on itching, chest pain, breathing difficulties, right ankle/foot pain, etc.     Very itchy lately - more than normal.  Scratching to the point of bleeding and getting sores on arms, upper chest, shoulder, scalp, upper back, legs.  Benadryl not covered by insurance.  Hydroxyzine is covered for $12/month is expensive. Helps but still up all night itching.  No one else at home itchy.  No rash or red marks except after itching.  Has tried lotion and uses lotion daily after shower.  Sensitive skin and allergic to many things.  Has never worked with dermatology.  Hasn't been taking her lipitor or mg lately.      Wants mammogram to get her $20 gift card.      Has been getting chest pain like someone is punching her in the chest.  Worse with taking a deep breath.  Chest and lungs are super tight.  Makes her take a deep breath.  Confident that it is her acid reflux but it hasn't been this bad before.  Started 3 days ago.  Tried drinking water and milk but not helping.  Pain is there all day- sometimes goes away for 10 minutes but always comes back again.  Doesn't seem to get better/wrose with eating or activity.  No palpitations.  Sugars are \"normal\" this week .  Fell a couple weeks ago at night getting up to use bathroom and injured the right knee and right wrist.  Always lightheaded when she stands up to quick.  Lymphedema stable- no more pain or swellign " in legs.  Can't wear brace for her right ankle b/c of her swelling and discomfort.  Limited ambulation lately due to ankle injury.        ROS:  Constitutional, CV, Resp, GI, , MSK, skin, neuro, psych all negative except as outlined in the HPI above and patient denies any other symptoms.      History summarized from1-2: Endocrine consultation 9/25/2018 reviewed outlining recommendations for weight loss without good options.  We will increase Lantus to 35 units daily to 45 units if fasting sugars are not in range.  There going to work on getting the jaret glucose sensor covered.  Diabetic education recommended  Old Records-1:Care Everywhere consent signed and records obtained  Lab tests reviewed-1: 2018  Medicine tests reviewed-1: EKG from 2015 reviewed normal with sinus tach at 125 BP M    Physical Exam:  /72 (Patient Site: Left Arm, Patient Position: Sitting, Cuff Size: Adult Regular)   Pulse 100   Resp 28   Wt (!) 323 lb 4 oz (146.6 kg)   Breastfeeding? No   BMI 61.08 kg/m   Body mass index is 61.08 kg/m . No LMP recorded.  Vital signs reviewed  Wt Readings from Last 3 Encounters:   10/30/18 (!) 323 lb 4 oz (146.6 kg)   09/18/18 (!) 319 lb (144.7 kg)   05/30/18 (!) 309 lb (140.2 kg)     Social History     Tobacco Use   Smoking Status Never Smoker   Smokeless Tobacco Never Used   Tobacco Comment    family members smoke outside      Social History     Substance and Sexual Activity   Sexual Activity Yes     Partners: Male     Birth control/protection: None, Condom     No Data Recorded  PHQ-9 Total Score: 4 (5/30/2018  2:56 PM)    PHQ-2 Total Score: 0 (5/30/2018  2:56 PM)    No Data Recorded    All normal as below except abnormalities include: Patient overall appears well and at her baseline.  She is able to talk comfortably in no acute dyspnea noted.  Skin also appears within normal with multiple open sores on her arms and legs upper chest and back with evidence of excoriation and scratching in this  areas.  No evidence of hives or candidal infections noted today.  No rashes specifically.  1+ edema in both legs stable from previous exams.  Uses a seated walker for ambulation.  General is a  43 y.o. female sitting comfortably in no apparent distress.   Neck: Supple without lymphadenopathy or thyromegally  CV: Regular rate and rhythm S1S2 without rubs, murmurs or gallops,   Lungs: Clear to auscultation bilaterally  Abd:  +BS, soft NT/ND,  No masses or organomegally  Extremities: Warm, 2+ Pedal and radial pulses bilaterally  Skin: No lesions or rashes noted  Neuro/MSK: Able to ambulate around the exam room with equal movement, strength and normal coordination of the upper and lower extremeties symmetrically    Results for orders placed or performed in visit on 10/30/18   D-dimer, Quantitative   Result Value Ref Range    D-Dimer, Quant 0.37 <=0.50 FEU ug/mL   Troponin I   Result Value Ref Range    Troponin I <0.01 0.00 - 0.29 ng/mL   HM2(CBC w/o Differential)   Result Value Ref Range    WBC 11.8 (H) 4.0 - 11.0 thou/uL    RBC 5.28 3.80 - 5.40 mill/uL    Hemoglobin 11.8 (L) 12.0 - 16.0 g/dL    Hematocrit 37.7 35.0 - 47.0 %    MCV 71 (L) 80 - 100 fL    MCH 22.5 (L) 27.0 - 34.0 pg    MCHC 31.4 (L) 32.0 - 36.0 g/dL    RDW 15.1 (H) 11.0 - 14.5 %    Platelets 241 140 - 440 thou/uL    MPV 9.0 7.0 - 10.0 fL   Comprehensive Metabolic Panel   Result Value Ref Range    Sodium 135 (L) 136 - 145 mmol/L    Potassium 4.6 3.5 - 5.0 mmol/L    Chloride 101 98 - 107 mmol/L    CO2 17 (L) 22 - 31 mmol/L    Anion Gap, Calculation 17 5 - 18 mmol/L    Glucose 251 (H) 70 - 125 mg/dL    BUN 10 8 - 22 mg/dL    Creatinine 0.67 0.60 - 1.10 mg/dL    GFR MDRD Af Amer >60 >60 mL/min/1.73m2    GFR MDRD Non Af Amer >60 >60 mL/min/1.73m2    Bilirubin, Total 0.6 0.0 - 1.0 mg/dL    Calcium 9.1 8.5 - 10.5 mg/dL    Protein, Total 7.9 6.0 - 8.0 g/dL    Albumin 3.2 (L) 3.5 - 5.0 g/dL    Alkaline Phosphatase 99 45 - 120 U/L    AST 13 0 - 40 U/L    ALT 19 0 -  "45 U/L   Thyroid [place]   Result Value Ref Range    TSH 2.24 0.30 - 5.00 uIU/mL       Med list and active problem list reviewed and updated as part of this encounter    Current Outpatient Medications on File Prior to Visit   Medication Sig Dispense Refill     atorvastatin (LIPITOR) 40 MG tablet Take 1 tablet (40 mg total) by mouth at bedtime. 90 tablet 4     BD ULTRA-FINE MILLI PEN NEEDLE 32 gauge x 5/32\" Ndle USE THREE TIMES DAILY 300 each 0     BLOOD SUGAR DIAGNOSTIC (TRUETEST TEST STRIPS MISC) Use As Directed. Test 3 times daily       buPROPion (WELLBUTRIN XL) 150 MG 24 hr tablet Take 1 tablet (150 mg total) by mouth daily. 90 tablet 2     ergocalciferol (ERGOCALCIFEROL) 50,000 unit capsule TAKE 1 CAPSULE BY MOUTH 1 TIME A WEEK 12 capsule 4     ibuprofen (ADVIL,MOTRIN) 800 MG tablet TAKE 1 TABLET BY MOUTH THREE TIMES DAILY WITH FOOD AS NEEDED 90 tablet 0     insulin glargine (LANTUS; BASAGLAR) 100 unit/mL (3 mL) pen Inject 40 Units under the skin daily.       lancets (TRUEPLUS LANCETS) 33 gauge Misc Use As Directed. Check BG 3 tmes daily       magnesium oxide (MAGOX) 400 mg tablet Take 1 tablet (400 mg total) by mouth daily. 30 tablet 11     metFORMIN (GLUCOPHAGE) 1000 MG tablet TAKE 1 TABLET(1000 MG) BY MOUTH TWICE DAILY WITH MEALS 180 tablet 11     metoclopramide (REGLAN) 5 MG tablet Take 1 tablet (5 mg total) by mouth daily as needed for nausea. 30 tablet 1     miscellaneous medical supply Misc Compression Stockings- custom knee high with zipper 4 each 0     NEXT CHOICE ONE DOSE tablet   5     NOVOFINE PLUS 32 gauge x 1/6\" Ndle Inject 1 each under the skin daily. With basaglar insulin       omeprazole (PRILOSEC) 40 MG capsule Take 1 capsule (40 mg total) by mouth daily before breakfast. 90 capsule 4     pioglitazone (ACTOS) 30 MG tablet TK 1 T PO QD  0     pramipexole (MIRAPEX) 0.5 MG tablet TAKE 1 TABLET(0.5 MG) BY MOUTH AT BEDTIME 90 tablet 3     sertraline (ZOLOFT) 100 MG tablet TAKE 2 TABLETS BY MOUTH " DAILY 180 tablet 3     TRULICITY 1.5 mg/0.5 mL PnIj INJECT 1.5 MG SUBCUTANEOUS ONCE A WEEK  1     No current facility-administered medications on file prior to visit.          Giselle Munoz MD

## 2021-06-21 NOTE — PROGRESS NOTES
11-9-18  Called and spoke to patient to follow up on decision whether to enroll in [facility]  Stated she did received the [facility] brochure and that she will discuss with her CADI Worker if she needs to enroll or not.  Patient has a CADI Waiver  for support.      Patient was identified as a potential candidate for the Clinic Care Coordination program and has declined participating.  I will discontinue outreach to the patient at this time. I have notified the patient s physician.   If the provider feels this patient is a good fit in the future I have asked them to resend to the [facility] referral bucket.  Patient have Care Guide contact information should she change her mind.     Patient declined [facility] at this time.

## 2021-06-21 NOTE — PROGRESS NOTES
[facility] Daily Progress     Patient Name: Mariola Rdz  Date: 10/18/2018  Visit #: 5/16  PTA visit #:  0  Referral Diagnosis:   Closed fibular fracture [S82.409A]  - Primary       Plantar fasciitis of right foot [M72.2]       Referring provider: Giselle Munoz MD  Visit Diagnosis:     ICD-10-CM    1. Plantar fasciitis M72.2    2. Closed fracture of distal end of left fibula, unspecified fracture morphology, initial encounter S82.832A    3. Generalized muscle weakness M62.81    4. Unsteady gait R26.81          Assessment:   Pt reports mild decrease in pain when ambulating from bedroom to bathroom, reports she can ambulate a little farther.  Pt spend the majority of her day in bed and reports she is not active around the house.  Has difficulty with stretching and strengthening in a standing position due to foot pain and balance deficits.      Patient demonstrates understanding/independence with home program.  Patient is benefitting from skilled physical therapy and is making steady progress toward functional goals.  Patient is appropriate to continue with skilled physical therapy intervention, as indicated by initial plan of care.     From Eval: Pt is a 43 y.o. year old female with R foot pain and L closed fibular fracture.  Patient has difficulty with all functional mobility due to R heel pain.  Findings are consistent with plantar fasciitis on R foot.  Pt reports a history of R plantar fasciitis that would come and go but only last a few day. Pt has a history of R ankle surgery and recent L fibular fracture.    Goal Status:  Pt. will demonstrate/verbalize independence in self-management of condition in : 4 weeks  Pt. will be independent with home exercise program in : 4 weeks  Pt. will report decreased intensity, frequency of : Pain;in 12 weeks;Comment  Comment:: in R foot   Pt. will have improved quality of sleep: with less pain;waking less times/night;in 12 weeks  Patient will stand : 10  minutes;with less pain;with less difficultty;for home chores;in 12 weeks  Pt will: increase score on LEFS by >10 points to demonstrate increased functional mobility in 12 weeks.   Pt will: walk with pain below 3/10 for household mobility in 12 weeks.        5/30/18 FINDINGS: Nondisplaced transverse fracture distal tip left fibula. No additional fractures identified. Left ankle in normal alignment. Marked soft tissue swelling about the left ankle. Left ankle effusion. Arterial calcifications. Plantar calcaneal spur.      9/18/18 FINDINGS: No change in alignment distal fibular fracture there has been some interval callus formation consistent with early healing. No new fracture. No dislocation. There is a plantar calcaneal spur.  Plan / Patient Education:     Continue with initial plan of care.  Progress with home program as tolerated.     Plan for next session: review HEP from last session   Continue ankle strengthening   R foot stretching  xarthritis    Subjective:   Pt reports she had to stop and rest when walking 1 block.   Has a brace coming for plantar fascitis.   Pt reports mild decrease in pain.    Pt reports fall last weeks, small bruise distal to knee    Objective:     Exercises:  Exercise #1: Stretching: DF stretch with purple theraband in long sit , gastroc/soleus stretch - discontinued standing due to balance deficits.   Comment #1: ankle strengthening:PF with purple band in long sit   Exercise #2: PF in long sit with yellow band  Comment #2: Seated exercises: HS stretch, marching, heel raises   Exercise #3: plantar fasica stretch hold 30 sec   Comment #3: SLR x10   Exercise #4: supine abduction x10   Comment #4: heel raises x10   Exercise #5: LAQ x10 with 2-3 sec hold     TREATMENT MINUTES COMMENTS   Evaluation     Self-care/ Home management   Discussed importance of not walking barefoot which increases pressure on arch of foot and plantar fascia. Encouraged pt to put on shoes when walking to the  bathroom.     Manual therapy 10 STM to R plantar fascia, PROM stretching into DF    Neuromuscular Re-education     Therapeutic Activity     Therapeutic Exercises 30 See ex's flow sheet    Gait training     Modality___                 Total 40    Blank areas are intentional and mean the treatment did not include these items.       Kathy Goncalves, PT, DPT  10/18/2018

## 2021-06-21 NOTE — LETTER
Letter by Obdulio Sutton PharmD at      Author: Obdulio Sutton PharmD Service: -- Author Type: --    Filed:  Encounter Date: 5/11/2021 Status: (Other)         05/11/21     Mariola Rdz  [address] [place] [address]          Dear Dr. Alexander Garnett has recommended you schedule a Medication Therapy Management (MTM) appointment. MTM is designed to help you get the most of out of your medications.     During an MTM appointment, you will meet with a specially trained pharmacist to review all of your medications, both prescription and over-the-counter. They will make sure your medications are the best choice for you, safe, and working well. The MTM pharmacist works together with you and your doctor to help you understand your medications, solve any problems related to your medications, and help you meet your health goals.     To make an appointment, please call the MTM scheduling line at [phone] and toll free [phone].      We look forward to hearing from you!    Sincerely,       Obdulio Sutton PharmD  Medication Therapy Management (MTM) Pharmacist  [facility]

## 2021-06-21 NOTE — PROGRESS NOTES
10-12-18  2nd Attempt outreach to follow up on decision to enroll in [facility].  Called and left voice message to call Care Guide [phone]    Reach out 10-26-18 3rd attempt #3

## 2021-06-21 NOTE — PROGRESS NOTES
11-1-18  3rd attempt. Follow up if she had a chance to read [facility] brochure.  Called and left voice message to call Care Guide back of decision whether to enroll in [facility].  If this patient is returning my call please transfer to  [phone].    If patient does not return call next week by 11-9-18, then discontinue reach out.

## 2021-06-21 NOTE — LETTER
Letter by Giselle Munoz MD at      Author: Giselle Munoz MD Service: -- Author Type: --    Filed:  Encounter Date: 5/19/2021 Status: (Other)         Mariola dRz  [address]  [place] [address]             May 19, 2021         Dear Ms. Kajal,    Below are the results from your recent visit:  Your cholesterol is still too high- be sure you are taking your atorvastatin 80mg every day    Your diabetes is still too high- I will ask your diabetes Dr Lyon about starting another pill to take daily.      Your liver and kidney tests are healthy.      You do not have tuberculosis or hepatitis C    Your iron seems low- be sure you are taking your vitamin with iron daily.      Your vitamin D is low- be sure you are taking this at least once or twice a week    My pharmacist can meet with you in person or over the phone to make sure you have all the right medicines and that you stay healthy.      There are lots of places to get the one shot Juan and Juan covid vaccine near you- my pharmacist can help you get this scheduled.      Resulted Orders   Hepatitis C Antibody (Anti-HCV)   Result Value Ref Range    Hepatitis C Ab Negative Negative   Glycosylated Hemoglobin A1c   Result Value Ref Range    Hemoglobin A1c 12.1 (H) <=5.6 %   LDL Cholesterol, Direct   Result Value Ref Range    Direct  <=129 mg/dl   Comprehensive Metabolic Panel   Result Value Ref Range    Sodium 138 136 - 145 mmol/L    Potassium 4.3 3.5 - 5.0 mmol/L    Chloride 102 98 - 107 mmol/L    CO2 23 22 - 31 mmol/L    Anion Gap, Calculation 13 5 - 18 mmol/L    Glucose 269 (H) 70 - 125 mg/dL    BUN 8 8 - 22 mg/dL    Creatinine 0.64 0.60 - 1.10 mg/dL    GFR MDRD Af Amer >60 >60 mL/min/1.73m2    GFR MDRD Non Af Amer >60 >60 mL/min/1.73m2    Bilirubin, Total 0.4 0.0 - 1.0 mg/dL    Calcium 8.2 (L) 8.5 - 10.5 mg/dL    Protein, Total 7.0 6.0 - 8.0 g/dL    Albumin 2.7 (L) 3.5 - 5.0 g/dL    Alkaline Phosphatase 116 45 - 120  U/L    AST 11 0 - 40 U/L    ALT 14 0 - 45 U/L    Narrative    Fasting Glucose reference range is 70-99 mg/dL per  American Diabetes Association (ADA) guidelines.   HM2(CBC w/o Differential)   Result Value Ref Range    WBC 12.0 (H) 4.0 - 11.0 thou/uL    RBC 5.12 3.80 - 5.40 mill/uL    Hemoglobin 11.3 (L) 12.0 - 16.0 g/dL    Hematocrit 37.2 35.0 - 47.0 %    MCV 73 (L) 80 - 100 fL    MCH 22.1 (L) 27.0 - 34.0 pg    MCHC 30.4 (L) 32.0 - 36.0 g/dL    RDW 18.8 (H) 11.0 - 14.5 %    Platelets 481 (H) 140 - 440 thou/uL    MPV 9.4 7.0 - 10.0 fL   Vitamin D, Total (25-Hydroxy)   Result Value Ref Range    Vitamin D, Total (25-Hydroxy) 13.5 (L) 30.0 - 80.0 ng/mL    Narrative    Deficiency <10.0 ng/mL  Insufficiency 10.0-29.9 ng/mL  Sufficiency 30.0-80.0 ng/mL  Toxicity (possible) >100.0 ng/mL   Ferritin   Result Value Ref Range    Ferritin 11 10 - 130 ng/mL   QFT-Quantiferon TB Gold Plus   Result Value Ref Range    Quantiferon-TB Gold Plus Negative Negative      Comment:      No interferon gamma response to M.tuberculosis antigens was detected. Infection with M.tuberculosis is unlikely, however a single negative result does not exclude infection. In patients at high risk for infection, a second test should be considered in accordance with the 2017 ATS/IDSA/CDC Clinical Practice Guidelines for Diagnosis of Tuberculosis in Adults and Children     TB1 Ag minus Nil Value 0.03 IU/mL    TB2 Ag minus Nil Value 0.04 IU/mL    Mitogen minus Nil Result 9.94 IU/mL    Nil Result 0.06 IU/mL   QFT-Quantiferon TB Gold Plus Grey Tube   Result Value Ref Range    Quantiferon Nil Tube 0.06 IU/mL   QFT-Quantiferon TB Gold Plus Green Tube   Result Value Ref Range    Quantiferon TB1 Tube 0.09 IU/mL   QFT-Quantieron TB Gold Yellow Tube   Result Value Ref Range    Quantiferon TB2 Tube 0.10    QFT-Quantiferon TB Gold Purple Tube   Result Value Ref Range    Quantiferon Mitogen 10.00 IU/mL         Please call with questions or contact us using  MyChart.    Sincerely,        Electronically signed by Giselle Munoz MD

## 2021-06-21 NOTE — PROGRESS NOTES
[facility] Daily Progress     Patient Name: Mariola Rdz  Date: 10/23/2018  Visit #: 6/16  PTA visit #:  0  Referral Diagnosis:   Closed fibular fracture [S82.409A]  - Primary       Plantar fasciitis of right foot [M72.2]       Referring provider: Giselle Munoz MD  Visit Diagnosis:     ICD-10-CM    1. Plantar fasciitis M72.2    2. Closed fracture of distal end of left fibula, unspecified fracture morphology, initial encounter S82.832A    3. Generalized muscle weakness M62.81    4. Unsteady gait R26.81          Assessment:   Pt reports mild decrease in pain when ambulating from bedroom to bathroom, reports she can ambulate a little farther.  Pt spend the majority of her day in bed and reports she is not active around the house, has only been leaving the house for appointments.  Has difficulty with stretching and strengthening in a standing position due to foot pain and balance deficits.      Patient demonstrates understanding/independence with home program.  Patient is benefitting from skilled physical therapy and is making steady progress toward functional goals.  Patient is appropriate to continue with skilled physical therapy intervention, as indicated by initial plan of care.     From Eval: Pt is a 43 y.o. year old female with R foot pain and L closed fibular fracture.  Patient has difficulty with all functional mobility due to R heel pain.  Findings are consistent with plantar fasciitis on R foot.  Pt reports a history of R plantar fasciitis that would come and go but only last a few day. Pt has a history of R ankle surgery and recent L fibular fracture.    Goal Status:  Pt. will demonstrate/verbalize independence in self-management of condition in : 4 weeks  Pt. will be independent with home exercise program in : 4 weeks  Pt. will report decreased intensity, frequency of : Pain;in 12 weeks;Comment  Comment:: in R foot   Pt. will have improved quality of sleep: with less pain;waking  less times/night;in 12 weeks  Patient will stand : 10 minutes;with less pain;with less difficultty;for home chores;in 12 weeks  Pt will: increase score on LEFS by >10 points to demonstrate increased functional mobility in 12 weeks.   Pt will: walk with pain below 3/10 for household mobility in 12 weeks.        5/30/18 FINDINGS: Nondisplaced transverse fracture distal tip left fibula. No additional fractures identified. Left ankle in normal alignment. Marked soft tissue swelling about the left ankle. Left ankle effusion. Arterial calcifications. Plantar calcaneal spur.      9/18/18 FINDINGS: No change in alignment distal fibular fracture there has been some interval callus formation consistent with early healing. No new fracture. No dislocation. There is a plantar calcaneal spur.  Plan / Patient Education:     Continue with initial plan of care.  Progress with home program as tolerated.     Plan for next session: review HEP from last session   Continue ankle strengthening   R foot stretching  xarthritis    Subjective:   Pt reports 45-50% decrease in pain in R foot.   Pt reports difficulty with stairs.   Has a brace coming for plantar fascitis.   Pt reports mild decrease in pain.    Pt reports fall last weeks, small bruise distal to knee    Objective:     Exercises:  Exercise #1: Stretching: DF stretch with purple theraband in long sit , gastroc/soleus stretch - discontinued standing due to balance deficits.   Comment #1: ankle strengthening:PF with purple band in long sit   Exercise #2: PF in long sit with yellow band  Comment #2: Seated exercises: HS stretch, marching, heel raises   Exercise #3: plantar fasica stretch hold 30 sec   Comment #3: SLR x10   Exercise #4: supine abduction x10 - discontinued due to L ankle pain   Comment #4: heel raises x10   Exercise #5: LAQ x10 with 2-3 sec hold   Comment #5: clams x5       TREATMENT MINUTES COMMENTS   Evaluation     Self-care/ Home management   Discussed importance of  not walking barefoot which increases pressure on arch of foot and plantar fascia. Encouraged pt to put on shoes when walking to the bathroom.  Discussed limiting sugary drinks (soda) and eating healthy   Manual therapy 10 STM to R plantar fascia, PROM stretching into DF    Neuromuscular Re-education     Therapeutic Activity     Therapeutic Exercises 30 Pt performed stairs today up 4 stairs.  Recommended up with R and down with L to avoid excess pressure on L leg.  See ex's flow sheet    Gait training     Modality___                 Total 40    Blank areas are intentional and mean the treatment did not include these items.       Kathy Gonclaves, PT, DPT  [facility] Discharge Summary  Patient Name: Mariola Rdz  Date: 11/29/2018  Referral Diagnosis: Closed fibular fracture, plantar fasciitis of right foot  Referring provider: Giselle Munoz MD  Visit Diagnosis:   1. Plantar fasciitis     2. Closed fracture of distal end of left fibula, unspecified fracture morphology, initial encounter     3. Generalized muscle weakness     4. Unsteady gait         Goals:  Pt. will demonstrate/verbalize independence in self-management of condition in : 4 weeks  Pt. will be independent with home exercise program in : 4 weeks  Pt. will report decreased intensity, frequency of : Pain;in 12 weeks;Comment  Comment:: in R foot   Pt. will have improved quality of sleep: with less pain;waking less times/night;in 12 weeks  Patient will stand : 10 minutes;with less pain;with less difficultty;for home chores;in 12 weeks    Pt will: increase score on LEFS by >10 points to demonstrate increased functional mobility in 12 weeks.   Pt will: walk with pain below 3/10 for household mobility in 12 weeks.       Patient was seen for 6 visits from 9/26/18 to 10/23/18 with 1 missed appointments.  The patient discontinued therapy, did not return.    Therapy will be discontinued at this time.  The patient will need a new referral  to resume.    Thank you for your referral.  Kathy Goncalves, PT, DPT   11/29/2018  9:27 AM  10/23/2018

## 2021-06-22 NOTE — PROGRESS NOTES
"Physical Therapy  WHEELCHAIR: INITIAL EVALUATION   PHYSICAL THERAPY - OUTPATIENT       Eval Date: 12/19/2018   Rx Units:  Eval (30 minutes), W/C Management (25 minutes)  Total OP Minutes: 55     SUBJECTIVE    Medical Diagnosis and Date: L ankle fracture May 2018  Date of last MD visit: 12/7/2018  Treatment Diagnosis: Gait Instability, History of Falls, Impaired balance, physical deconditioning  Height: 5' 1\"   Weight: 324  Precautions/PMH: R distal fibula closed avulsion fx in 2009 (slipped and fell on the ice), plantar fasciitis of R foot, chronic midline LB pain, OA in knees R > L, L broken ankle May 24, 2018 per pt report, DM II, GERD, restless leg syndome, lymphedema in both legs R > L, scliosis    History of falls: Falls 1-2x/week \"I am not sure why I am falling\"    Living Situation: Apartment  Bottom floor with ramp  Doorway/Room accessibility: Yes  Lives: With Roommates ( and 2 roommates)  Caregiver Support: PCA 21 hours/week helps with showering, making meals, light house keeping, shopping    Current/Future Model of Transportation: [employer] or [employer] bus   Where is w/c stored during transport? Tied down    Hours spent in wheelchair: NA as pt no w/c  How is wheelchair used throughout the day?   Current seating/mobility equipment: Pt only has 4WW  Years Old:   Problems with current equipment: NA   Cost of repairs: NA    Pain: Yes Rating: LBP: on average an 8-9/10\" It comes and goes but majority of the time it stays\" Pt reports she can't sit in one position too long. R ankle/foot:  On average it is 10/10 with weight bearing  L foot/ankle pain averages 5/10 Pt states pain can increase to 10/10 with a lot of standing and walking     How does pain interfere with mobility?: Limits pt's ability to walk any distance, stand and perform ADLs  Pt reports her activity tolerance is very poor because she is in pain. She has increased dyspnea with activity.    Patient s Goal: \"I would like to get a scooter\" Pt " "wants it for pain relief and decrease falls risk.  Scooter would help pt with small meal prep and reach the bathroom in time.    OBJECTIVE    Baseline/Resting Vitals :  Oxygen Saturation (SaO2): 95%  On room air    Heart Rate (HR): 105           Blood Pressure (BP): 138/79  Rate of Perceived Exertion (RPE): 0/10     Rate of Perceived Dyspnea (RPD): 0/10    Cognition: Memory: intact  Problem Solving: intact  Judgement: intact Attn/Concentration: intact  Vision: intact  Hearing: intact  Comments: Wears glasses    Sensation: intact     Comments: NA  Proprioception: intact Comments: NA  History of Skin Breakdown/Pressure Sores: No       History of Swelling:   Yes     Comments: R LE> L LE  Swelling is constant and doesn't help with repositioning  Ability to Weight Shift: Yes  Comments: NA       Strength and ROM:  All assessed in sitting position  Lower Extremity Strength   L       R ROM L    R Comments Upper Extremity Strength    L      R     ROM L    R Comments   Hip Flexion 5/5 [place] WFL  Shoulder Flexion 4/5 [place] R: 100 degrees  L: 110 degrees    Hip Extension 5/5 [place] WFL  Shoulder Extension 5/5 Faith     Hip Abduction 3/5 [place] WFL  Shoulder Abd L: 4/5  R: 5/5 B: 90 degrees    Knee Ext 4/5 [place] WFL  Elbow Flexion 5/5 Faith WFL [place]    Knee Flexion 5/5 [place] WFl  Elbow Ext 5/5 [place] WFL [place]    Dorsi Flexion 4+/5 L  5/5 R  WFL Pain with testing L ankle Wrist Flexion 5/5 Faith WFL [place]    Plantar Flex WFL [place] WFL  Wrist Ext 5/5 [place] WFL Faith        Finger Ext/Flex WFL [place] WFL [place]    Tone:    Lower Extremity: WFL [place]   Upper Extremity: WFl [place]  Balance:    Seated: Static: Independent  Dynamic: Independent  Standing: Static: Mod I with UE support      Test/Score: TUG with 4WW: Trial 1: 23 sec, Trial 2: 20.75 sec = 21.88 sec  Low Falls Risk is > 13 seconds    ADL Status:    Independent Assist Unable Comments   Dressing  X  \"I can't bend down and my back and arms hurt\"   Bathing  X     Grooming/Hygiene   X  \"I do this sitting down because it " "is easier- it hurts to  back and foot\"   Feeding X      Toileting X      Meal Prep  X  Because it is difficult to tolerate and stand and walk   Bowel/Bladder X   Pt reports some incontinence- sometimes I can't make it time to get to the bathroom because of my foot    Transfers:   Independent Assist Comments   Bed Mobility X     W/C ? Chair X     Car NT       Gait:  10 M walk test: Comfortable Gait Speed   Assistive Device: 4WW    Assistance: 11.12 sec  = .53 m/sec = 1.39 m/sec is age and gender norm for 67 year female  Distance: 10M  Gait Analysis: Heavy UE support, Step through gait pattern, complains of pain in low back with walking, slow paced    Activity Tolerance: 6 MWT: 148 feet completed. Pt stopped at 3 minutes as sat down due to pain and shortness of breath.  Pt took 3 standing rest breaks during test due to complaints of pain and dyspnea  After Activity:  O2 Saturation 98 %       129Heart Rate         7/10 RPD following 6 MWT         W/C Mobility Skills:     Independent  Assistance Comments/Tolerance (Fatigue, Dyspnea,Pain)   Manual W/C X  Pain in both shoudlers, wheels slowly, increased dyspnea    Scooter   TBD   Power W/C        Other: Marleny Wild, MARTÍN from [employer] was present to assist with w/c assessment. Pt would benefit from a heavy duty scooter to improve efficiency of mobility while decreasing her falls risk and maintain dyspnea and pain at a minimum. [employer] to perform home trial and in-home assessment.  PT will complete a Letter of Medical Necessity for equipment justification and accessories.                 "

## 2021-06-22 NOTE — PROGRESS NOTES
[facility] Office Visit    Chief Complaint:  Chief Complaint   Patient presents with     evaluation     for motorized scooter         Assessment/Plan:  1. Vitamin D deficiency  Reviewed recommendation to take her vitamin D twice a week at this point.  - ergocalciferol (ERGOCALCIFEROL) 50,000 unit capsule; Take 1 capsule (50,000 Units total) by mouth 2 (two) times a week.  Dispense: 8 capsule; Refill: 11    2. Osteoarthrosis Of The Knee  Ability evaluation appreciated to determine need and benefit of motorized scooter for safety  - Ambulatory referral to PT/OT    3. Chronic midline low back pain without sciatica  [place] as above  - Ambulatory referral to PT/OT    4. Morbid obesity with BMI of 45.0-49.9, adult (H)  Plan as above  - Ambulatory referral to PT/OT    5. Plantar fasciitis of right foot  Plan as above  - Ambulatory referral to PT/OT    6. Closed avulsion fracture of distal fibula with delayed healing, left  Plan as above  - Ambulatory referral to PT/OT      Return in about 3 months (around 3/7/2019) for Annual physical.  The following high BMI interventions were performed this visit: encouragement to exercise and lifestyle education regarding diet    Patient Education/AVS:  There are no Patient Instructions on file for this visit.    HPI:   Mariola Rdz is a 43 y.o. female c/o hoping to get a motorized scooter.      Has been having a lot of pain in her left ankle fracture that is slowly healing.  Completed full course of PT.  Hard to walk around.  Falling easily and slipped on ice a couple times this year.  Bad pain on right foot from plantar faciitis.  Gets blisters easily with walking short distances.  Limited to her apartment at this point.  Getting a lot of back pain.  Can't make it up a hill at this point.  Falling about once a week even in her apartment while getting out of bed or to the bathroom.      Still getting out of breath more than usual.      ROS:  Constitutional, CV,  "Resp, GI, , MSK, skin, neuro, psych all negative except as outlined in the HPI above and patient denies any other symptoms.      Physical Exam:  /78 (Patient Site: Right Arm, Patient Position: Sitting, Cuff Size: Adult Regular) Comment (Patient Site): lower arm  Pulse (!) 107   Temp 97.9  F (36.6  C) (Oral)   Ht 5' 1\" (1.549 m)   Wt (!) 324 lb 6 oz (147.1 kg)   SpO2 97%   BMI 61.29 kg/m   Body mass index is 61.29 kg/m . No LMP recorded.  Vital signs reviewed  Wt Readings from Last 3 Encounters:   12/07/18 (!) 324 lb 6 oz (147.1 kg)   10/30/18 (!) 323 lb 4 oz (146.6 kg)   09/18/18 (!) 319 lb (144.7 kg)     Social History     Tobacco Use   Smoking Status Never Smoker   Smokeless Tobacco Never Used   Tobacco Comment    family members smoke outside      Social History     Substance and Sexual Activity   Sexual Activity Yes     Partners: Male     Birth control/protection: Condom     No Data Recorded  PHQ-9 Total Score: 8 (12/7/2018  2:00 PM)    PHQ-2 Total Score: 4 (12/7/2018  2:00 PM)    No Data Recorded    All normal as below except abnormalities include: She appears at her baseline.  She uses a seated walker for ambulation and walks with a limp favoring her left side.  She does appear quite cautious with ambulation and walks very slowly.  She does have difficulty standing from a seated position.  Dramatic weight gain noted since her ankle fracture last summer due to her decreased mobility.  General is a  43 y.o. female sitting comfortably in no apparent distress.   Extremities: Warm, No Edema, 2+ Pedal and radial pulses bilaterally  Skin: Multiple scabs and open sores from picking at her skin, improved however from her last evaluation.    Results for orders placed or performed during the hospital encounter of 11/20/18   Surgical Pathology Exam   Result Value Ref Range    Case Report       Surgical Pathology                                Case: [id number]                                    Authorizing " Provider:  Giselle Munoz MD     Collected:           11/20/2018 1355              Ordering Location:     [facility]   Received:            11/20/2018 4304                                     [place]                                                                    Pathologist:           Carly Tanner MD                                                          Specimen:    Breast, Left, 12:00, zone 2-3                                                              Final Diagnosis       LEFT BREAST, 12 O'CLOCK, ZONE 2-3, ULTRASOUND-GUIDED CORE BIOPSIES OF MASS:     1)   BENIGN FIBROADENOMA     2)   NO EVIDENCE OF IN SITU OR INVASIVE MALIGNANCY    Comment Dr. Yoseph Fajardo concurs with the diagnosis.     Microscopic Description       Sections show blue inked needle core fragments of fibroadipose breast tissue. Scattered benign breast ductules and lobules are present as well as a benign fibroadenoma. Proliferative lesions, in situ and invasive malignancy are not identified.    Clinical Information       Clinical history: Left Breast Mass  Reason for procedure: Left Breast Mass    Core Breast Biopsy  Method: Ultrasound  Breast: Left   Site: 12 o'clock, Zone 2-3  Number of Cores: 3  Indication: Mass  Pre-test Suspicion: Intermediate   Microcalcifications on Specimen Radiograph: N/A  Microcalcifications are in Box: N/A  Time of Collection: 1355  Time Placed in Formalin: 1355  Radiologist: Betsy Chowdary MD    Gross Description       Received in formalin, labeled with the patient's name, Mariola Rdz, and left breast, 12 o'clock, zone 2-3 are multiple, minute to 1.3 x 0.1 cm, irregular to cylindrical, yellow-white to pink cores of tissue. The cores are inked blue. F&TE-1C     NOTE: The specimen is placed into formalin at 1355, 11/20/18. RJR:dbr    Charges       CPT: 44867  ICD-10:  D24.2    CC: Betsy Chowdary MD    Result Flag  Normal       Med list and active problem list  "reviewed and updated as part of this encounter    Current Outpatient Medications on File Prior to Visit   Medication Sig Dispense Refill     atorvastatin (LIPITOR) 40 MG tablet Take 1 tablet (40 mg total) by mouth at bedtime. 90 tablet 4     BD ULTRA-FINE MILLI PEN NEEDLE 32 gauge x 5/32\" Ndle USE THREE TIMES DAILY 300 each 0     BLOOD SUGAR DIAGNOSTIC (TRUETEST TEST STRIPS MISC) Use As Directed. Test 3 times daily       buPROPion (WELLBUTRIN XL) 150 MG 24 hr tablet Take 1 tablet (150 mg total) by mouth daily. 90 tablet 2     hydrOXYzine HCl (ATARAX) 50 MG tablet 1-2 every 6 hours as needed for itching 120 tablet 11     ibuprofen (ADVIL,MOTRIN) 800 MG tablet TAKE 1 TABLET BY MOUTH THREE TIMES DAILY WITH FOOD AS NEEDED 90 tablet 0     ibuprofen (ADVIL,MOTRIN) 800 MG tablet TAKE 1 TABLET BY MOUTH THREE TIMES DAILY WITH FOOD AS NEEDED 90 tablet 0     insulin glargine (LANTUS; BASAGLAR) 100 unit/mL (3 mL) pen Inject 40 Units under the skin daily.       lancets (TRUEPLUS LANCETS) 33 gauge Misc Use As Directed. Check BG 3 tmes daily       metFORMIN (GLUCOPHAGE) 1000 MG tablet TAKE 1 TABLET(1000 MG) BY MOUTH TWICE DAILY WITH MEALS 180 tablet 11     metoclopramide (REGLAN) 5 MG tablet Take 1 tablet (5 mg total) by mouth daily as needed for nausea. 30 tablet 1     miscellaneous medical supply Misc Compression Stockings- custom knee high with zipper 4 each 0     montelukast (SINGULAIR) 10 mg tablet TAKE 1 TABLET(10 MG) BY MOUTH DAILY AT BEDTIME AS DIRECTED 90 tablet 4     NEXT CHOICE ONE DOSE tablet   5     NOVOFINE PLUS 32 gauge x 1/6\" Ndle Inject 1 each under the skin daily. With basaglar insulin       omeprazole (PRILOSEC) 40 MG capsule Take 1 capsule (40 mg total) by mouth daily before breakfast. 90 capsule 4     pioglitazone (ACTOS) 30 MG tablet TK 1 T PO QD  0     pramipexole (MIRAPEX) 0.5 MG tablet TAKE 1 TABLET(0.5 MG) BY MOUTH AT BEDTIME 90 tablet 3     sertraline (ZOLOFT) 100 MG tablet TAKE 2 TABLETS BY MOUTH DAILY " 180 tablet 3     TRULICITY 1.5 mg/0.5 mL PnIj INJECT 1.5 MG SUBCUTANEOUS ONCE A WEEK  1     white petrolatum (AQUAPHOR ORIGINAL) 41 % Oint Apply to skin daily 396 g 11     magnesium oxide (MAGOX) 400 mg tablet Take 1 tablet (400 mg total) by mouth daily. 30 tablet 11     No current facility-administered medications on file prior to visit.          Giselle Munoz MD

## 2021-06-22 NOTE — PROGRESS NOTES
Physical Therapy  Medicare Certification    Medical Diagnosis and Date: L ankle fracture May 2018  Date of last MD visit: 12/7/2018  Treatment Diagnosis: Gait Instability, History of Falls, Impaired balance, physical deconditioning  Referring MD: Giselle Munoz MD  Onset date 5/24/2018     Start of Care 12/19/2018    Assessment:  43 year female presents to PT requesting power mobility as she can no longer tolerate walking any distance or stand for periods of time due to back pain and ankle pain.  She has significant dyspnea with all activity as well. Pt had a fall in May of 2018 causing her to break her L ankle and it is very slow to heal. She also broke her R ankle in 2009.  Her PMHx includes plantar fasciitis of R foot, chronic midline LB pain, OA in knees R > L, , DM II, GERD, restless leg syndome, lymphedema in both legs R > L, scoliosis.  During the PT evaluation pt scored well below age and gender norms in regards to her gait speed, balance assessment and activity tolerance. Pt would benefit from a power scooter to improve her efficiency and decrease her pain allowing her to be safe and indepenndent with home and community mobility as well as with ADLs.    Prognostic Indicators:  Rehabilitation potential: Good    Impairment:  Gait, Balance, Activity Tolerance, Respiratory Function and Pain    Functional Goals:  to be met by 1 evaluation and 1 visit  1. Pt and/or caregiver will verbalize understanding of current process of receiving a w/c covered by insurance.  2. Pt will have process initiated to receive proper adaptive equipment to maximize efficiency, safety and independence with functional mobility.      Plan of Care:  Communication with referral source, patient, caregiver., Paitent/Family Instruction: Treatment plan/rationale, home exercise program, expected functional outcome. and Wheelchair Management    Frequency / Duration: 1 x/week for up to 1 visit    MEDICARE PATIENTS:  HICN #  [id number]  Provider #   Certification Dates: from 12/19/2018 to 12/19/2018    Yuko Salcedo   12/19/2018   4:28 PM      Physician Recommendation:  1. I certify the need for these services furnished within this plan and while under my care. I agree with the therapist's recommendation for plan of care.    2. If there is any recommendation for modification of therapy plan, please indicate below.      Physician's Signature (Printed):  _____________________________

## 2021-06-23 NOTE — TELEPHONE ENCOUNTER
Name of form/paperwork: Other:  Power mobility device sheet- need office visit notes,order,   Have you been seen for this request: 12/7/19 - Pt left forms with MD( pt was evaluated for a scooter).   Do we have the form: Yes- form was faxed 4 times to clinic, - pt needs this completed today . or its voided * pt has to come back in Pt left forms with MD on 12/7/19 , the form has also been faxed may times to clinic.   Please confirm forms received !!!   When is form needed by: TODAY ASAP   How would you like the form returned:  Fax  Fax Number:    Patient Notified form requests are processed in 3-5 business days: Yes  (If patient needs form sooner, please note that in this message.)  Okay to leave a detailed message? Yes

## 2021-06-23 NOTE — TELEPHONE ENCOUNTER
Called pt caller states pt was still in the restroom, informed caller to have pt call clinic back. When pt calls back, okay to relay Giselle Munoz MD's message below.

## 2021-06-23 NOTE — TELEPHONE ENCOUNTER
Patient Returning Call  Reason for call:  Return call.  Information relayed to patient:  Patient was informed of Dr. Munoz' message below in regards to contacting [employer]. Please reach out to [employer] per Giselle Munoz MD's notes below.  Patient has additional questions:  No  If YES, what are your questions/concerns:  n/a  Okay to leave a detailed message?: No

## 2021-06-23 NOTE — TELEPHONE ENCOUNTER
Pt had mobility assessment on 12/19/18 with [place] [facility] and qualifies for a heavy duty scooter.  They were going to work with [employer] as they were present at the assessment.  They were going to do an in-home assessment and PT was going to complete a letter of medical necessity, etc.      Please call [employer] for update and see what they need from me.      The forms I have are from [employer] division which is a national chain and we don't work with them.

## 2021-06-24 NOTE — TELEPHONE ENCOUNTER
Refill Approved    Rx renewed per Medication Renewal Policy. Medication was last renewed on 2/15/18.    Brianna Arana, [facility] [place] Connection Triage/Med Refill 2/22/2019     Requested Prescriptions   Pending Prescriptions Disp Refills     sertraline (ZOLOFT) 100 MG tablet [Pharmacy Med Name: SERTRALINE 100MG TABLETS] 180 tablet 0     Sig: TAKE 2 TABLETS BY MOUTH DAILY    [facility] Refill Protocol  Passed - 2/21/2019  3:49 AM       Passed - PCP or prescribing provider visit in last year    Last office visit with prescriber/PCP: 12/7/2018 Giselle Munoz MD OR same dept: 12/7/2018 Giselle Munoz MD OR same specialty: 12/7/2018 Giselle Munoz MD  Last physical: 11/30/2015 Last MTM visit: Visit date not found   Next visit within 3 mo: Visit date not found  Next physical within 3 mo: Visit date not found  Prescriber OR PCP: Giselle Munoz MD  Last diagnosis associated with med order: 1. Mild Recurrent Major Depression  - sertraline (ZOLOFT) 100 MG tablet [Pharmacy Med Name: SERTRALINE 100MG TABLETS]; TAKE 2 TABLETS BY MOUTH DAILY  Dispense: 180 tablet; Refill: 0    If protocol passes may refill for 12 months if within 3 months of last provider visit (or a total of 15 months).

## 2021-06-24 NOTE — TELEPHONE ENCOUNTER
Refill Approved    Rx renewed per Medication Renewal Policy. Medication was last renewed on 5/6/18.    Anita Saleem, [facility] Connection Triage/Med Refill 2/17/2019     Requested Prescriptions   Pending Prescriptions Disp Refills     buPROPion (WELLBUTRIN XL) 150 MG 24 hr tablet [Pharmacy Med Name: BUPROPION XL 150MG TABLETS (24 H)] 90 tablet 0     Sig: TAKE 1 TABLET(150 MG) BY MOUTH DAILY    Tricyclics/Misc Antidepressant/Antianxiety Meds Refill Protocol Passed - 2/14/2019  4:02 AM       Passed - PCP or prescribing provider visit in last year    Last office visit with prescriber/PCP: 12/7/2018 Giselle Munoz MD OR same dept: 12/7/2018 Giselle Muonz MD OR same specialty: 12/7/2018 Giselle Munoz MD  Last physical: 11/30/2015 Last MTM visit: Visit date not found   Next visit within 3 mo: Visit date not found  Next physical within 3 mo: Visit date not found  Prescriber OR PCP: Giselle Munoz MD  Last diagnosis associated with med order: 1. Mild Recurrent Major Depression  - buPROPion (WELLBUTRIN XL) 150 MG 24 hr tablet [Pharmacy Med Name: BUPROPION XL 150MG TABLETS (24 H)]; TAKE 1 TABLET(150 MG) BY MOUTH DAILY  Dispense: 90 tablet; Refill: 0    If protocol passes may refill for 12 months if within 3 months of last provider visit (or a total of 15 months).

## 2021-06-25 NOTE — TELEPHONE ENCOUNTER
Patient Returning Call  Reason for call:  Returning call from clinic  Information relayed to patient:  Message below. Patient agrees to come in on 3/21 at 12:20 PM. Writer does not have double book access. Please add patient to PCP schedule. Thank you.   Patient has additional questions:  No  If YES, what are your questions/concerns:  N/A  Okay to leave a detailed message?: No call back needed

## 2021-06-25 NOTE — TELEPHONE ENCOUNTER
RN Triage:    Spoke with 43 yr old Janet who c/o itching, states the medication given is not helpful.    Hydroxyzine HCL started 10/30/18.      Pt states she seemed better but severe itching began 3 days ago.    Now becoming sores on hands and shoulders blades due to scratching so much.    R foot itches the most.    Hard to sleep at night.    Pt is requesting if she can take additional Singulair when increased symptoms of itching.    Willing to be seen at clinic but only with PCP after 12:00 pm on 3/20, 3/21 or 3/22 if possible.    PLAN:  Advised OV within 3 days per protocol.  Will consult with PCP per pt request for ability to see patient this week after 12:00 noon  Pt requesting if additional Singulair can be taken?  Please advise.  Advised pt to call back if symptoms worsen.    Jeanette Holguin RN   [facility] Connection RN Triage    Reason for Disposition    Widespread itching and cause unknown and present > 48 hours    Protocols used: ITCHING - WIDESPREAD-A-OH

## 2021-06-25 NOTE — TELEPHONE ENCOUNTER
Refill Approved    Rx renewed per Medication Renewal Policy.Medication was last renewed on 5/6/21, last OV 5/6/21    Deisy Morgan, [facility] Triage/Med Refill 5/30/2021     Requested Prescriptions   Pending Prescriptions Disp Refills     pramipexole (MIRAPEX) 0.5 MG tablet [Pharmacy Med Name: PRAMIPEXOLE 0.5MG TABLETS] 180 tablet 0     Sig: TAKE 1 TABLET(0.5 MG) BY MOUTH TWICE DAILY       Parkinson's Meds I Refill Protocol Passed - 5/29/2021  4:27 PM        Passed - PCP or prescribing provider visit in past 6 months      Last office visit with prescriber/PCP: 5/6/2021 OR same dept: 5/6/2021 Giselle Munoz MD OR same specialty: 5/6/2021 Giselle Munoz MD Last physical: Visit date not found Last MTM visit: Visit date not found     Next appt within 3 mo: Visit date not found  Next physical within 3 mo: Visit date not found  Prescriber OR PCP: Giselle Munoz MD  Last diagnosis associated with med order: 1. Restless Legs Syndrome  - pramipexole (MIRAPEX) 0.5 MG tablet [Pharmacy Med Name: PRAMIPEXOLE 0.5MG TABLETS]; TAKE 1 TABLET(0.5 MG) BY MOUTH TWICE DAILY  Dispense: 180 tablet; Refill: 0    If protocol passes may refill for 6 months if within 3 months of last provider visit (or a total of 9 months).             Pramipexole/Ropinirole Refill Protocol Passed - 5/29/2021  4:27 PM        Passed - PCP or prescribing provider visit in past 6 months      Last office visit with prescriber/PCP: 5/6/2021 OR same dept: 5/6/2021 Giselle Munoz MD OR same specialty: 5/6/2021 Giselle Munoz MD Last physical: Visit date not found Last MTM visit: Visit date not found         Next appt within 3 mo: Visit date not found  Next physical within 3 mo: Visit date not found  Prescriber OR PCP: Giselle Munoz MD  Last diagnosis associated with med order: 1. Restless Legs Syndrome  - pramipexole (MIRAPEX) 0.5 MG tablet [Pharmacy Med Name: PRAMIPEXOLE 0.5MG TABLETS]; TAKE 1 TABLET(0.5 MG) BY MOUTH TWICE  DAILY  Dispense: 180 tablet; Refill: 0     If protocol passes may refill for 6 months if within 3 months of last provider visit (or a total of 9 months).

## 2021-06-25 NOTE — PROGRESS NOTES
[facility] Clinic Office Visit    Chief Complaint:  Chief Complaint   Patient presents with     Itching     Arms, Back,          Assessment/Plan:  1. Itching  Acute on chronic symptoms.  Check for iron deficiency and metabolic/liver/renal issues that may be contributing.  5 day course of prednisone to treat symptoms short term due to suffering expressed by pt- aware this will cause sugars to increases but okay as long as they stay under 300 short term.  Trial of permethrin for possible scabies.  F/u with dermatology asap.  Continue hydroxyzine 50mg prn and singulair daily.  Reviewed bathing routine limiting use of drying soaps and use of aquaphor daily. Only new med is glipizide xl.    - HM1(CBC and Differential)  - Ferritin  - Ambulatory referral to Dermatology  - Comprehensive Metabolic Panel  - HM1 (CBC with Diff)  - predniSONE (DELTASONE) 20 MG tablet; Take 40 mg by mouth daily for 5 days.  Dispense: 10 tablet; Refill: 0  - permethrin (ELIMITE) 5 % cream; Apply from neck to toes and wash off after 14 hours.  Dispense: 60 g; Refill: 1    2. Type 2 diabetes mellitus with complication, without long-term current use of insulin (H)  Reviewed records from [facility] endocrinology and checked umar today.  Also ordered diabetic shoes as pt does have high risk for ulcer formation due to foot deformity and callous formation.  Continue same medications and f/u with endocrine as recommended.    - Microalbumin, Random Urine  - Diabetic Shoe    3. Major depressive disorder, recurrent episode, mild (H)  Stable.  Continue wellbutrin xl 150mg daily and sertraline 100mg daily.      4. Morbid obesity with BMI of 45.0-49.9, adult (H)  Limited activity due to chronic pain in feet/ankles/knees and mobility issues.  Awaiting motorized scooter to help with mobility but pt is encouraged to follow regular activity program to help with glucose and weight    5. Seasonal allergic rhinitis due to pollen  Continue singulair  daily    6. Polyneuropathy associated with underlying disease (H)  Pt high risk for diabetic foot ulcers due to polyneuropathy, foot deformity and callous formation and venous stasis-poor cirulcation.  Diabetic shoes recommended.    - Diabetic Shoe    7. Restless Legs Syndrome  Increase to two times a day dosing.    - pramipexole (MIRAPEX) 0.5 MG tablet; Take 1 tablet (0.5 mg total) by mouth 2 (two) times a day.  Dispense: 180 tablet; Refill: 3    8. Venous stasis  - Diabetic Shoe    9. Microcytic anemia  Likely multifactorial related to low iron diet.  Will check for thalassemia contribution as well.    - Hemoglobinopathy/Thalassemia Cascade  - Iron and Transferrin Iron Binding Capacity      Return in about 3 months (around 6/21/2019) for Annual physical.  The following are part of a depression follow up plan for the patient:  implementation of measures to provide psychological support  The following high BMI interventions were performed this visit: encouragement to exercise and lifestyle education regarding diet    Patient Education/AVS:  There are no Patient Instructions on file for this visit.    HPI:   Mariola Rdz is a 43 y.o. female c/o itching.  Wondering if she can take her singulair 2x/day if bad allergies.  Needs refill for shoes and compression socks at [facility].      Has always been a little itchy but getting really bad.  All over- upper chest arms, legs, head, neck, groin, back, stomach.  itching to the point of bleeding and scabbing.  Got really bad last week.  Tried hydroxyzine and benadryl but didn't help.  Did start on glipizide xr from diabetes doctor.  Takes hydroxyzine to help with sleep.  Restless legs are worse than usual during the day and the night.      Scooter- did qualify and waiting for paperwork to complete.      ROS:  Constitutional, CV, Resp, GI, , MSK, skin, neuro, psych all negative except as outlined in the HPI above and patient denies any other symptoms.      History  summarized from1-2:endocrine consult 1/29/19.  Continue metformin, actos, trulicity and lantus and add glipizide xl daily.    Old Records-1:Care Everywhere consent signed and records obtained  Lab tests reviewed-1: 2019    Physical Exam:  /72 (Patient Site: Right Arm, Patient Position: Sitting, Cuff Size: Adult Regular) Comment (Patient Site): forearm  Pulse (!) 104   Temp 96.7  F (35.9  C) (Oral)   Resp 28   Wt (!) 334 lb (151.5 kg)   LMP 02/25/2019 (Approximate)   Breastfeeding? No   BMI 63.11 kg/m   Body mass index is 63.11 kg/m . Patient's last menstrual period was 02/25/2019 (approximate).  Vital signs reviewed  Wt Readings from Last 3 Encounters:   03/21/19 (!) 334 lb (151.5 kg)   12/07/18 (!) 324 lb 6 oz (147.1 kg)   10/30/18 (!) 323 lb 4 oz (146.6 kg)     Social History     Tobacco Use   Smoking Status Never Smoker   Smokeless Tobacco Never Used   Tobacco Comment    family members smoke outside      Social History     Substance and Sexual Activity   Sexual Activity Yes     Partners: Male     Birth control/protection: Condom     No Data Recorded  PHQ-9 Total Score: 8 (12/7/2018  2:00 PM)    PHQ-2 Total Score: 4 (12/7/2018  2:00 PM)    No Data Recorded    All normal as below except abnormalities include: pt is seen frequently scratching her arms and shoulder.  Scratching to the point of open sores on arms, upper back, upper chest and lower legs.  No evidence for skin infections.  Scratching between fingers and toes.  No obvious rash seen.  No obvious scabies signs seen.  Foot exam does shoe callous formation, 1+ edema in both feet/ankles and venous stasis skin changes in lower ext bilaterally.  No open sores noted.  Uses a seated walker for ambulation but walks very slowly and deliberately having to stop often in hallway to rest.  Does need help getting up on exam table today.    General is a  43 y.o. female sitting comfortably in no apparent distress.   HEENT:  Eye, nasal, oral exams within  normal   Neck: Supple without lymphadenopathy or thyromegally  CV: Regular rate and rhythm S1S2 without rubs, murmurs or gallops,   Lungs: Clear to auscultation bilaterally  Extremities: Warm, No Edema, 2+ Pedal and radial pulses bilaterally  Skin: No lesions or rashes noted      Results for orders placed or performed in visit on 03/21/19   Microalbumin, Random Urine   Result Value Ref Range    Microalbumin, Random Urine 1.40 0.00 - 1.99 mg/dL    Creatinine, Urine 125.1 mg/dL    Microalbumin/Creatinine Ratio Random Urine 11.2 <=19.9 mg/g   Ferritin   Result Value Ref Range    Ferritin 5 (L) 10 - 130 ng/mL   Comprehensive Metabolic Panel   Result Value Ref Range    Sodium 139 136 - 145 mmol/L    Potassium 4.6 3.5 - 5.0 mmol/L    Chloride 103 98 - 107 mmol/L    CO2 21 (L) 22 - 31 mmol/L    Anion Gap, Calculation 15 5 - 18 mmol/L    Glucose 189 (H) 70 - 125 mg/dL    BUN 12 8 - 22 mg/dL    Creatinine 0.70 0.60 - 1.10 mg/dL    GFR MDRD Af Amer >60 >60 mL/min/1.73m2    GFR MDRD Non Af Amer >60 >60 mL/min/1.73m2    Bilirubin, Total 0.5 0.0 - 1.0 mg/dL    Calcium 8.5 8.5 - 10.5 mg/dL    Protein, Total 7.0 6.0 - 8.0 g/dL    Albumin 2.9 (L) 3.5 - 5.0 g/dL    Alkaline Phosphatase 73 45 - 120 U/L    AST 10 0 - 40 U/L    ALT 13 0 - 45 U/L   HM1 (CBC with Diff)   Result Value Ref Range    WBC 11.1 (H) 4.0 - 11.0 thou/uL    RBC 4.54 3.80 - 5.40 mill/uL    Hemoglobin 9.4 (L) 12.0 - 16.0 g/dL    Hematocrit 33.4 (L) 35.0 - 47.0 %    MCV 74 (L) 80 - 100 fL    MCH 20.7 (L) 27.0 - 34.0 pg    MCHC 28.1 (L) 32.0 - 36.0 g/dL    RDW 18.6 (H) 11.0 - 14.5 %    Platelets 564 (H) 140 - 440 thou/uL    MPV 10.1 8.5 - 12.5 fL    Neutrophils % 61 50 - 70 %    Lymphocytes % 28 20 - 40 %    Monocytes % 5 2 - 10 %    Eosinophils % 5 0 - 6 %    Basophils % 1 0 - 2 %    Neutrophils Absolute 6.7 2.0 - 7.7 thou/uL    Lymphocytes Absolute 3.1 0.8 - 4.4 thou/uL    Monocytes Absolute 0.6 0.0 - 0.9 thou/uL    Eosinophils Absolute 0.6 (H) 0.0 - 0.4 thou/uL     "Basophils Absolute 0.1 0.0 - 0.2 thou/uL   Hemoglobinopathy/Thalassemia Cascade   Result Value Ref Range    Hgb A2 Quant 2.9 2.2 - 3.5 %    Hgb F Quant <0.8 0.0 - 2.0 %    Hemoglobin,ELP Alpha thalassemia trait.     Path ICD: D50.9     Interpreted By: Ender Rea MD    Iron and Transferrin Iron Binding Capacity   Result Value Ref Range    Iron 24 (L) 42 - 175 ug/dL    Transferrin 315 212 - 360 mg/dL    Transferrin Saturation, Calculated 6 (L) 20 - 50 %    Transferrin IBC, Calculated 394 313 - 563 ug/dL       Med list and active problem list reviewed and updated as part of this encounter    Current Outpatient Medications on File Prior to Visit   Medication Sig Dispense Refill     atorvastatin (LIPITOR) 40 MG tablet Take 1 tablet (40 mg total) by mouth at bedtime. 90 tablet 4     BD ULTRA-FINE MILLI PEN NEEDLE 32 gauge x 5/32\" Ndle USE THREE TIMES DAILY 300 each 0     BLOOD SUGAR DIAGNOSTIC (TRUETEST TEST STRIPS MISC) Use As Directed. Test 3 times daily       buPROPion (WELLBUTRIN XL) 150 MG 24 hr tablet TAKE 1 TABLET(150 MG) BY MOUTH DAILY 90 tablet 3     ergocalciferol (ERGOCALCIFEROL) 50,000 unit capsule Take 1 capsule (50,000 Units total) by mouth 2 (two) times a week. 8 capsule 11     glipiZIDE (GLUCOTROL XL) 10 MG 24 hr tablet TK 1 T PO QD AC  3     hydrOXYzine HCl (ATARAX) 50 MG tablet 1-2 every 6 hours as needed for itching 120 tablet 11     ibuprofen (ADVIL,MOTRIN) 800 MG tablet TAKE 1 TABLET BY MOUTH THREE TIMES DAILY WITH FOOD AS NEEDED 90 tablet 0     ibuprofen (ADVIL,MOTRIN) 800 MG tablet TAKE 1 TABLET BY MOUTH THREE TIMES DAILY WITH FOOD AS NEEDED 90 tablet 0     ibuprofen (ADVIL,MOTRIN) 800 MG tablet TAKE 1 TABLET BY MOUTH THREE TIMES DAILY WITH FOOD AS NEEDED 90 tablet 0     ibuprofen (ADVIL,MOTRIN) 800 MG tablet TAKE 1 TABLET BY MOUTH THREE TIMES DAILY WITH FOOD AS NEEDED 90 tablet 3     insulin glargine (BASAGLAR KWIKPEN U-100 INSULIN) 100 unit/mL (3 mL) pen Inject 50 Units under the skin.       " "lancets (TRUEPLUS LANCETS) 33 gauge Misc Use As Directed. Check BG 3 tmes daily       magnesium oxide (MAGOX) 400 mg tablet Take 1 tablet (400 mg total) by mouth daily. 30 tablet 11     metFORMIN (GLUCOPHAGE) 1000 MG tablet TAKE 1 TABLET(1000 MG) BY MOUTH TWICE DAILY WITH MEALS 180 tablet 11     metoclopramide (REGLAN) 5 MG tablet Take 1 tablet (5 mg total) by mouth daily as needed for nausea. 30 tablet 1     miscellaneous medical supply Misc Compression Stockings- custom knee high with zipper 4 each 0     montelukast (SINGULAIR) 10 mg tablet TAKE 1 TABLET(10 MG) BY MOUTH DAILY AT BEDTIME AS DIRECTED 90 tablet 4     NEXT CHOICE ONE DOSE tablet   5     NOVOFINE PLUS 32 gauge x 1/6\" Ndle Inject 1 each under the skin daily. With basaglar insulin       omeprazole (PRILOSEC) 40 MG capsule Take 1 capsule (40 mg total) by mouth daily before breakfast. 90 capsule 4     pioglitazone (ACTOS) 30 MG tablet TK 1 T PO QD  0     sertraline (ZOLOFT) 100 MG tablet TAKE 2 TABLETS BY MOUTH DAILY 180 tablet 2     TRULICITY 1.5 mg/0.5 mL PnIj INJECT 1.5 MG SUBCUTANEOUS ONCE A WEEK  1     white petrolatum (AQUAPHOR ORIGINAL) 41 % Oint Apply to skin daily 396 g 11     No current facility-administered medications on file prior to visit.          Giselle Munoz MD    "

## 2021-06-26 NOTE — PROGRESS NOTES
Progress Notes by Giselle Munoz MD at 9/18/2018  1:40 PM     Author: Giselle Munoz MD Service: -- Author Type: Physician    Filed: 10/2/2018 12:48 PM Encounter Date: 9/18/2018 Status: Signed    : Giselle Munoz MD (Physician)       [facility] Office Visit    Chief Complaint:  Chief Complaint   Patient presents with   ? Ankle Pain     L X 05/24   ? Foot Pain     R X 2 WKS         Assessment/Plan:  1. Closed fibular fracture  X-ray reviewed by myself does show ongoing fracture with minimal evidence of healing.  Vitamin D level checked today.  She is asked to limit her walking at this time especially if there is pain or swelling.  Ice keep her leg elevated and rest as much as possible.  Follow-up with orthopedics for evaluation and PT as recommended.  - XR Ankle Left 2 VWS; Future  - Vitamin D, Total (25-Hydroxy)  - Ambulatory referral to Orthopedics  - Ambulatory referral to PT/OT    2. Morbid obesity with BMI of 45.0-49.9, adult (H)  Limited mobility due to her morbid obesity and left ankle fracture, foot pain etc.  Couple comorbidities.  Would benefit from some care management in the community to help with services to keep her active, improved diet, social supports etc.  - Ambulatory referral to Care Management (Primary Care)    3. Type 2 diabetes mellitus with complication, without long-term current use of insulin (H)  Labs checked today as she is due for these.  Follow-up with [facility] as scheduled.  Flu shot given today.  - Glycosylated Hemoglobin A1c  - Vitamin B12  - Comprehensive Metabolic Panel    4. Vitamin D deficiency  Check level and continue replacement as indicated.  - Vitamin D, Total (25-Hydroxy)    5. Essential Hypercholesterolemia  Patient does need to be on her statin medication encouraged to restart this at home.    6. Diabetic polyneuropathy associated with type 2 diabetes mellitus (H)  Increased risk of injury and poor healing area discussed  the importance of glucose control and good care of her feet and ankles.    7. Hypomagnesemia  Magnesium level remains borderline low.  She should probably continue her magnesium at least a couple days a week.  - Magnesium    8. Plantar fasciitis of right foot  Discussed treatment options.  At this point patient would like a night splint and PT.  She Grabiel has orthotics through [facility]  - Ankle/Foot DME: Plantar Fasciitis Splint; Right  - Ambulatory referral to PT/OT  - Ambulatory referral to Care Management (Primary Care)    9. Mild intellectual disability  Care coordination to help improve adherence to medical recommendations.    Return in about 3 months (around 12/18/2018) for Recheck.  The following high BMI interventions were performed this visit: encouragement to exercise and lifestyle education regarding diet    Patient Education/AVS:  Patient Instructions       Understanding Plantar Fasciitis    Plantar fasciitis is a condition that causes foot and heel pain. The plantar fascia is a tough band of tissue that runs across the bottom of the foot from the heel to the toes. This tissue pulls on the heel bone. It supports the arch of the foot as it pushes off the ground. If the tissue becomes irritated or red and swollen (inflamed), it is called plantar fasciitis.  How to say it  PLAN-tuhr fa-see-IY-tis   What causes plantar fasciitis?  Plantar fasciitis most often occurs from overusing the plantar fascia. The tissue may become damaged from activities that put repeated stress on the heel and foot. Or it may wear down over time with age and ankle stiffness. You are more likely to have plantar fasciitis if you:    Do activities that require a lot of running, jumping, or dancing    Have a job that requires being on your feet for long periods    Are overweight or obese    Have certain foot problems, such as a tight Achilles tendon, flat feet, or high arches    Often wear poorly fitting shoes  Symptoms of plantar  fasciitis  The condition most often causes pain in the heel and the bottom of the foot. The pain may occur when you take your first steps in the morning. It may get better as you walk throughout the day. But as you continue to put weight on the foot, the pain often returns. Pain may also occur after standing or sitting for long periods.  Treating plantar fasciitis  Treatments for plantar fasciitis include:    Resting the foot. This involves limiting movements that make your foot hurt. You may also need to avoid certain sports and types of work for a time.    Using cold packs. Put an ice pack on the heel and foot to help reduce pain and swelling.    Taking pain medicines. Prescription and over-the-counter pain medicines can help relieve pain and swelling.    Using heel cups or foot inserts (orthotics). These are placed in the shoes to help support the heel or arch and cushion the heel. You may also be told to buy proper-fitting shoes with good arch support and cushioned soles.    Taping the foot. This supports the arch and limits the movement of the plantar fascia to help relieve symptoms.    Wearing a night splint. This stretches the plantar fascia and leg muscles while you sleep. This may help relieve pain.    Doing exercises and physical therapy. These stretch and strengthen the plantar fascia and the muscles in the leg that support the heel and foot.    Getting shots of medicine into the foot. These may help relieve symptoms for a time.    Having surgery. This may be needed if other treatments fail to relieve symptoms. During surgery, the surgeon may partially cut the plantar fascia to release tension.  Possible complications of plantar fasciitis  Without proper care and treatment, healing may take longer than normal. Also, symptoms may continue or get worse. Over time, the plantar fascia may be damaged. This can make it hard to walk or even stand without pain.  When to call your healthcare provider  Call your  healthcare provider right away if you have any of these:    Fever of 100.4 F (38 C) or higher, or as directed    Symptoms that dont get better with treatment, or get worse    New symptoms, such as numbness, tingling, or weakness in the foot   Date Last Reviewed: 3/10/2016    6043-8775 [employer]. [address] [place] [address], [place] [address]. All rights reserved. This information is not intended as a substitute for professional medical care. Always follow your healthcare professional's instructions.            HPI:   Mariola Rdz is a 43 y.o. female c/o ongoing left ankle and right foot pain.      Pt notes on 5/24/18 she injured her left ankle.  Was seen and told it was a sprain and given a brace.  Pt got very itchy red skin from the ankle brace and left marks .  Tends to have sensitive skin and stopped wearing this for 3 days.  Was called and told that she had a ankle fracture and was suppose to f/u.  Didn't come to this visit.  Still gets pain in this ankle and swelling.  Has worked with "" ortho in the past for trigger finger/cts in past but his has been many years.      Right foot pain.  Has h/o plantar faciitis but usually the pain goes away after a couple of days. Pt notes 2 weeks ago she did more walking than normal and by the end of the day had excruciating pain in right heel and was really hard to walk on it.  Has tried soaking in warm salt water, elevation, rest, stretching etc not really helping.  Will ease up a little but always gets bad again once she starts walking more.  Tends to wear good tennis shoes lately but had been wearing sandals when this first started.  [place].  Uses a seated walker for ambulation.  Gets specialized inserts and diabetic shoes that she did get recently from [facility].  Gets blisters on her feet if she walks too much and doesn't wear her diabetic shoes.        Having a hard time staying active due to foot and ankle  "pain.    Wants flu shot today.  Diabetic eye exam reviewed from 8/23/18- no retinopathy.      Declines mammogram.  Will consider at next visit.      Stopped taking her cholesterol pill but has a lot of them at home if needed.  40mg is too big for her to swallow.      ROS:  Constitutional, CV, Resp, GI, , MSK, skin, neuro, psych all negative except as outlined in the HPI above.    History summarized from1-2:5/30/18 seen for sprained ankle- given splint and advised to rest and elevate, etc.  Xray came back with a fracture as outlined below.  She was advised to return in 1 week for recheck.  Pt did not show up for this visit.   Radiology tests reviewed-1:   XR ANKLE LEFT 3 OR MORE VWS  5/30/2018 3:47 PM     INDICATION: Unspecified injury of left ankle, initial encounter.  COMPARISON: None.     FINDINGS: Nondisplaced transverse fracture distal tip left fibula. No additional fractures identified. Left ankle in normal alignment. Marked soft tissue swelling about the left ankle. Left ankle effusion. Arterial calcifications. Plantar calcaneal spur.    Lab tests reviewed-1: [id number]    Physical Exam:  /68 (Patient Site: Right Arm)  Pulse 72  Temp 98.3  F (36.8  C) (Oral)   Resp 15  Ht 5' 1\" (1.549 m)  Wt (!) 319 lb (144.7 kg)  LMP 08/29/2018 (Approximate)  BMI 60.27 kg/m2 Body mass index is 60.27 kg/(m^2). Patient's last menstrual period was 08/29/2018 (approximate).  Vital signs reviewed  Wt Readings from Last 3 Encounters:   09/18/18 (!) 319 lb (144.7 kg)   05/30/18 (!) 309 lb (140.2 kg)   01/31/18 (!) 290 lb (131.5 kg)     History   Smoking Status   ? Never Smoker   Smokeless Tobacco   ? Never Used     Comment: family members smoke outside      History   Sexual Activity   ? Sexual activity: Yes   ? Partners: Male   ? Birth control/ protection: None, Condom     No Data Recorded  PHQ-9 Total Score: 4 (5/30/2018  2:56 PM)  PHQ-2 Total Score: 0 (5/30/2018  2:56 PM)  No Data Recorded    All normal as below " except abnormalities include: Patient appears well.  She is here with a girlfriend of hers.  She uses seated walker for ambulation.  She does walk with a limp favoring the left side.  Tenderness over the left distal fibula tip.  Mild swelling and erythema over this area as well.  Tenderness over right plantar fascia and Achilles tendon/heel.  1+ pedal pulses bilaterally.  No skin breakdown.  Minimal callus formation on both feet.  General is a  43 y.o. female sitting comfortably in no apparent distress.   Neck: Supple without lymphadenopathy or thyromegally  CV: Regular rate and rhythm S1S2 without rubs, murmurs or gallops,   Lungs: Clear to auscultation bilaterally  Extremities: Warm, No Edema, 1+ Pedal and radial pulses bilaterally  Skin: No lesions or rashes noted  Neuro/MSK: Able to ambulate around the exam room with equal movement, strength and normal coordination of the upper and lower extremeties symmetrically    Results for orders placed or performed in visit on 09/18/18   Glycosylated Hemoglobin A1c   Result Value Ref Range    Hemoglobin A1c 8.8 (H) 3.5 - 6.0 %   Vitamin B12   Result Value Ref Range    Vitamin B-12 352 213 - 816 pg/mL   Vitamin D, Total (25-Hydroxy)   Result Value Ref Range    Vitamin D, Total (25-Hydroxy) 20.1 (L) 30.0 - 80.0 ng/mL   Comprehensive Metabolic Panel   Result Value Ref Range    Sodium 134 (L) 136 - 145 mmol/L    Potassium 4.3 3.5 - 5.0 mmol/L    Chloride 101 98 - 107 mmol/L    CO2 21 (L) 22 - 31 mmol/L    Anion Gap, Calculation 12 5 - 18 mmol/L    Glucose 351 (H) 70 - 125 mg/dL    BUN 13 8 - 22 mg/dL    Creatinine 0.70 0.60 - 1.10 mg/dL    GFR MDRD Af Amer >60 >60 mL/min/1.73m2    GFR MDRD Non Af Amer >60 >60 mL/min/1.73m2    Bilirubin, Total 0.5 0.0 - 1.0 mg/dL    Calcium 8.7 8.5 - 10.5 mg/dL    Protein, Total 7.4 6.0 - 8.0 g/dL    Albumin 2.9 (L) 3.5 - 5.0 g/dL    Alkaline Phosphatase 89 45 - 120 U/L    AST 11 0 - 40 U/L    ALT 13 0 - 45 U/L   Magnesium   Result Value Ref  "Range    Magnesium 1.8 1.8 - 2.6 mg/dL       Med list and active problem list reviewed and updated as part of this encounter    Current Outpatient Prescriptions on File Prior to Visit   Medication Sig Dispense Refill   ? atorvastatin (LIPITOR) 40 MG tablet Take 1 tablet (40 mg total) by mouth at bedtime. 90 tablet 4   ? BD ULTRA-FINE MILLI PEN NEEDLE 32 gauge x 5/32\" Ndle USE THREE TIMES DAILY 300 each 0   ? BLOOD SUGAR DIAGNOSTIC (TRUETEST TEST STRIPS MISC) Use As Directed. Test 3 times daily     ? buPROPion (WELLBUTRIN XL) 150 MG 24 hr tablet Take 1 tablet (150 mg total) by mouth daily. 90 tablet 2   ? ergocalciferol (ERGOCALCIFEROL) 50,000 unit capsule TAKE 1 CAPSULE BY MOUTH 1 TIME A WEEK 12 capsule 4   ? hydrOXYzine HCl (ATARAX) 50 MG tablet TAKE 1 TO 2 TABLETS BY MOUTH AT BEDTIME AS NEEDED FOR SLEEP OR ITCHING 60 tablet 6   ? ibuprofen (ADVIL,MOTRIN) 800 MG tablet TAKE 1 TABLET BY MOUTH THREE TIMES DAILY WITH FOOD AS NEEDED 90 tablet 0   ? insulin glargine (LANTUS; BASAGLAR) 100 unit/mL (3 mL) pen Inject 40 Units under the skin daily.     ? lancets (TRUEPLUS LANCETS) 33 gauge Misc Use As Directed. Check BG 3 tmes daily     ? magnesium oxide (MAGOX) 400 mg tablet Take 1 tablet (400 mg total) by mouth daily. 30 tablet 11   ? metFORMIN (GLUCOPHAGE) 1000 MG tablet TAKE 1 TABLET(1000 MG) BY MOUTH TWICE DAILY WITH MEALS 180 tablet 11   ? metoclopramide (REGLAN) 5 MG tablet Take 1 tablet (5 mg total) by mouth daily as needed for nausea. 30 tablet 1   ? miscellaneous medical supply Misc Compression Stockings- custom knee high with zipper 4 each 0   ? montelukast (SINGULAIR) 10 mg tablet TAKE 1 TABLET(10 MG) BY MOUTH DAILY AT BEDTIME AS DIRECTED 90 tablet 0   ? NEXT CHOICE ONE DOSE tablet   5   ? NOVOFINE PLUS 32 gauge x 1/6\" Ndle Inject 1 each under the skin daily. With basaglar insulin     ? omeprazole (PRILOSEC) 40 MG capsule Take 1 capsule (40 mg total) by mouth daily before breakfast. 90 capsule 4   ? pioglitazone " (ACTOS) 30 MG tablet TK 1 T PO QD  0   ? pramipexole (MIRAPEX) 0.5 MG tablet Take 1 tablet (0.5 mg total) by mouth at bedtime. 90 tablet 3   ? sertraline (ZOLOFT) 100 MG tablet TAKE 2 TABLETS BY MOUTH DAILY 180 tablet 3   ? TRULICITY 1.5 mg/0.5 mL PnIj INJECT 1.5 MG SUBCUTANEOUS ONCE A WEEK  1     No current facility-administered medications on file prior to visit.          Giselle Munoz MD

## 2021-06-29 ENCOUNTER — COMMUNICATION - HEALTHEAST (OUTPATIENT)
Dept: FAMILY MEDICINE | Facility: CLINIC | Age: 46
End: 2021-06-29

## 2021-06-29 DIAGNOSIS — Z79.4 TYPE 2 DIABETES MELLITUS WITH COMPLICATION, WITH LONG-TERM CURRENT USE OF INSULIN (H): ICD-10-CM

## 2021-06-29 DIAGNOSIS — E11.8 TYPE 2 DIABETES MELLITUS WITH COMPLICATION, WITH LONG-TERM CURRENT USE OF INSULIN (H): ICD-10-CM

## 2021-07-03 NOTE — ADDENDUM NOTE
Addendum Note by Obdulio Francisco, PharmNELSON at 4/17/2020  1:00 PM     Author: Obdulio Francisco PharmD Service: -- Author Type: Pharmacist    Filed: 4/17/2020  1:31 PM Encounter Date: 4/17/2020 Status: Signed    : Obdulio Francisco PharmD (Pharmacist)    Addended by: OBDULIO FRANCISCO on: 4/17/2020 01:31 PM        Modules accepted: Orders

## 2021-07-03 NOTE — ADDENDUM NOTE
Addendum Note by Elly Munoz MD at 10/6/2018  3:01 PM     Author: Elly Munoz MD Service: -- Author Type: Physician    Filed: 10/6/2018  3:01 PM Encounter Date: 10/5/2018 Status: Signed    : Elly Munoz MD (Physician)    Addended by: ELLY MUNOZ on: 10/6/2018 03:01 PM        Modules accepted: Orders

## 2021-07-03 NOTE — ADDENDUM NOTE
Addendum Note by Elly Munoz MD at 8/6/2020 11:34 AM     Author: Elly Munoz MD Service: -- Author Type: Physician    Filed: 8/6/2020 11:34 AM Encounter Date: 8/5/2020 Status: Signed    : Elly Munoz MD (Physician)    Addended by: ELLY MUNOZ on: 8/6/2020 11:34 AM        Modules accepted: Orders

## 2021-07-07 NOTE — TELEPHONE ENCOUNTER
Refill Approved    Rx renewed per Medication Renewal Policy. Medication was last renewed on 5/1/2020.    Kayli Otero, [facility] Triage/Med Refill 6/29/2021     Requested Prescriptions   Pending Prescriptions Disp Refills     metFORMIN (GLUCOPHAGE) 1000 MG tablet [Pharmacy Med Name: METFORMIN 1000MG TABLETS] 180 tablet 3     Sig: TAKE 1 TABLET(1000 MG) BY MOUTH TWICE DAILY WITH MEALS       Metformin Refill Protocol Failed - 6/29/2021  3:50 AM        Failed - Microalbumin in last year      Microalbumin, Random Urine   Date Value Ref Range Status   03/21/2019 1.40 0.00 - 1.99 mg/dL Final                  Passed - Blood pressure in last 12 months     BP Readings from Last 1 Encounters:   05/06/21 130/79             Passed - LFT or AST or ALT in last 12 months     Albumin   Date Value Ref Range Status   05/06/2021 2.7 (L) 3.5 - 5.0 g/dL Final     Bilirubin, Total   Date Value Ref Range Status   05/06/2021 0.4 0.0 - 1.0 mg/dL Final     Bilirubin, Direct   Date Value Ref Range Status   03/28/2014 0.5 <0.6 mg/dL Final     Alkaline Phosphatase   Date Value Ref Range Status   05/06/2021 116 45 - 120 U/L Final     AST   Date Value Ref Range Status   05/06/2021 11 0 - 40 U/L Final     ALT   Date Value Ref Range Status   05/06/2021 14 0 - 45 U/L Final     Protein, Total   Date Value Ref Range Status   05/06/2021 7.0 6.0 - 8.0 g/dL Final                Passed - GFR or Serum Creatinine in last 6 months     GFR MDRD Non Af Amer   Date Value Ref Range Status   05/06/2021 >60 >60 mL/min/1.73m2 Final     GFR MDRD Af Amer   Date Value Ref Range Status   05/06/2021 >60 >60 mL/min/1.73m2 Final             Passed - Visit with PCP or prescribing provider visit in last 6 months or next 3 months     Last office visit with prescriber/PCP: 5/6/2021 OR same dept: 5/6/2021 Giselle Munoz MD OR same specialty: 5/6/2021 Giselle Munoz MD Last physical: Visit date not found Last MTM visit: Visit date not found          Next appt within 3 mo: Visit date not found  Next physical within 3 mo: Visit date not found  Prescriber OR PCP: Giselle Munoz MD  Last diagnosis associated with med order: 1. Type 2 diabetes mellitus with complication, with long-term current use of insulin (H)  - metFORMIN (GLUCOPHAGE) 1000 MG tablet [Pharmacy Med Name: METFORMIN 1000MG TABLETS]; TAKE 1 TABLET(1000 MG) BY MOUTH TWICE DAILY WITH MEALS  Dispense: 180 tablet; Refill: 3     If protocol passes may refill for 12 months if within 3 months of last provider visit (or a total of 15 months).           Passed - A1C in last 6 months     Hemoglobin A1c   Date Value Ref Range Status   05/06/2021 12.1 (H) <=5.6 % Final

## 2021-07-20 DIAGNOSIS — E11.43 DIABETIC GASTROPARESIS (H): ICD-10-CM

## 2021-07-20 DIAGNOSIS — K31.84 DIABETIC GASTROPARESIS (H): ICD-10-CM

## 2021-07-24 RX ORDER — PANTOPRAZOLE SODIUM 40 MG/1
40 TABLET, DELAYED RELEASE ORAL DAILY
Qty: 90 TABLET | Refills: 2 | Status: SHIPPED | OUTPATIENT
Start: 2021-07-24 | End: 2022-05-02

## 2021-07-24 NOTE — TELEPHONE ENCOUNTER
"Last Written Prescription Date:  5/6/21  Last Fill Quantity: 90,  # refills: 4   Last office visit provider:  5/6/21     Requested Prescriptions   Pending Prescriptions Disp Refills     pantoprazole (PROTONIX) 40 MG EC tablet [Pharmacy Med Name: PANTOPRAZOLE 40MG TABLETS] 90 tablet 4     Sig: TAKE 1 TABLET(40 MG) BY MOUTH DAILY       PPI Protocol Passed - 7/20/2021  3:49 AM        Passed - Not on Clopidogrel (unless Pantoprazole ordered)        Passed - No diagnosis of osteoporosis on record        Passed - Recent (12 mo) or future (30 days) visit within the authorizing provider's specialty     Patient has had an office visit with the authorizing provider or a provider within the authorizing providers department within the previous 12 mos or has a future within next 30 days. See \"Patient Info\" tab in inbasket, or \"Choose Columns\" in Meds & Orders section of the refill encounter.              Passed - Medication is active on med list        Passed - Patient is age 18 or older        Passed - No active pregnacy on record        Passed - No positive pregnancy test in past 12 months             irma nelson RN 07/24/21 2:48 PM  "

## 2021-08-10 ENCOUNTER — TRANSFERRED RECORDS (OUTPATIENT)
Dept: HEALTH INFORMATION MANAGEMENT | Facility: CLINIC | Age: 46
End: 2021-08-10

## 2021-08-25 ENCOUNTER — TELEPHONE (OUTPATIENT)
Dept: FAMILY MEDICINE | Facility: CLINIC | Age: 46
End: 2021-08-25

## 2021-08-25 NOTE — TELEPHONE ENCOUNTER
Travel questionnaire was asked. Verified that they have no signs of COVID-19 symptoms.    [facility]. Placed the original copies in the 's slot.    When forms are completed, patient would like it:    Mail-Please mail it back to home address when form has been completed.    Ok to leave a detailed message if unable to get a hold of the Patient.    Please re-route task back to the  to shred the copied forms and complete the task. Thanks!

## 2021-09-02 DIAGNOSIS — Z53.9 DIAGNOSIS NOT YET DEFINED: Primary | ICD-10-CM

## 2021-09-02 PROCEDURE — G0179 MD RECERTIFICATION HHA PT: HCPCS | Performed by: FAMILY MEDICINE

## 2021-09-08 NOTE — TELEPHONE ENCOUNTER
Patient called and would like an additional completed copy sent to her home address. Nothing is scanned into the chart yet. Will wait until it's scanned and will print a copy to home address.

## 2021-09-09 NOTE — TELEPHONE ENCOUNTER
Pt is calling again and hasnt gotten her form yet and is very upset.  I advised it can take up to 7 days to get to her and she thinks its lost in mail.    Please call pt back as she is not happy with process.

## 2021-09-10 NOTE — TELEPHONE ENCOUNTER
Pt called back relayed message she is not happy that she hasnt received form yet I did tell her it was sent and the mail shoud get to her

## 2021-09-10 NOTE — TELEPHONE ENCOUNTER
Called and left message#1 for patient to return call to clinic for forms- it was patient's request for forms to be mailed- we followed her request.  The [employer] should be delivering this to her.

## 2021-09-13 NOTE — TELEPHONE ENCOUNTER
Please check to see if her [employer] paperwork forms are scanned in. Please print a copy and send it to her home address on file. She wanted another copy. Thanks!

## 2021-10-26 DIAGNOSIS — Z53.9 DIAGNOSIS NOT YET DEFINED: Primary | ICD-10-CM

## 2021-11-03 PROCEDURE — G0179 MD RECERTIFICATION HHA PT: HCPCS | Performed by: FAMILY MEDICINE

## 2021-11-09 DIAGNOSIS — F33.0 MAJOR DEPRESSIVE DISORDER, RECURRENT EPISODE, MILD (H): ICD-10-CM

## 2021-11-10 RX ORDER — BUPROPION HYDROCHLORIDE 150 MG/1
TABLET ORAL
Qty: 90 TABLET | Refills: 2 | Status: SHIPPED | OUTPATIENT
Start: 2021-11-10 | End: 2022-08-30

## 2021-11-10 NOTE — TELEPHONE ENCOUNTER
"Routing refill request to provider for review/approval because:  Labs not current:  PHQ9    PHQ 10/11/2019 7/17/2020   PHQ-9 Total Score 4 13   Q9: Thoughts of better off dead/self-harm past 2 weeks Not at all Not at all         Last Written Prescription Date:  2/12/21  Last Fill Quantity: 90,  # refills: 2   Last office visit provider:  5/6/21     Requested Prescriptions   Pending Prescriptions Disp Refills     buPROPion (WELLBUTRIN XL) 150 MG 24 hr tablet [Pharmacy Med Name: BUPROPION XL 150MG TABLETS (24 H)] 90 tablet 2     Sig: TAKE 1 TABLET(150 MG) BY MOUTH DAILY       SSRIs Protocol Failed - 11/9/2021  3:49 AM        Failed - PHQ-9 score less than 5 in past 6 months     Please review last PHQ-9 score.           Failed - Recent (6 mo) or future (30 days) visit within the authorizing provider's specialty     Patient had office visit in the last 6 months or has a visit in the next 30 days with authorizing provider or within the authorizing provider's specialty.  See \"Patient Info\" tab in inbasket, or \"Choose Columns\" in Meds & Orders section of the refill encounter.            Passed - Medication is Bupropion     If the medication is Bupropion (Wellbutrin), and the patient is taking for smoking cessation; OK to refill.          Passed - Medication is active on med list        Passed - Patient is age 18 or older        Passed - No active pregnancy on record        Passed - No positive pregnancy test in last 12 months             Corina Freeman RN 11/10/21 12:05 PM  "

## 2021-12-03 ENCOUNTER — TELEPHONE (OUTPATIENT)
Dept: FAMILY MEDICINE | Facility: CLINIC | Age: 46
End: 2021-12-03
Payer: MEDICARE

## 2021-12-03 NOTE — TELEPHONE ENCOUNTER
Reason for Call:  Other     Detailed comments: patient is calling today to let dr gonzalez know her scooter needs repairs, she called [facility] and they said she needs an order from her pcp    Phone Number Patient can be reached at: Home number on file [phone] (home)    Best Time: asap    Can we leave a detailed message on this number? YES    Call taken on 12/3/2021 at 1:33 PM by Reema Morton

## 2021-12-07 NOTE — TELEPHONE ENCOUNTER
RN cannot approve Refill Request    RN can NOT refill this medication med is not covered by policy/route to provider.    Alex Mendez, [facility] Triage/Med Refill 1/24/2019    Requested Prescriptions   Pending Prescriptions Disp Refills     ibuprofen (ADVIL,MOTRIN) 800 MG tablet [Pharmacy Med Name: IBUPROFEN 800MG TABLETS] 90 tablet 0     Sig: TAKE 1 TABLET BY MOUTH THREE TIMES DAILY WITH FOOD AS NEEDED    There is no refill protocol information for this order            Solaraze Counseling:  I discussed with the patient the risks of Solaraze including but not limited to erythema, scaling, itching, weeping, crusting, and pain.

## 2021-12-07 NOTE — TELEPHONE ENCOUNTER
Patient calls to follow-up on request.  Patient states [facility] also fax order last week and this week for the scooter. Have MA seen fax? Patient would like update on orders.

## 2021-12-08 NOTE — TELEPHONE ENCOUNTER
HISTORY OF PRESENT ILLNESS      Michael Li is a 78 y.o. male with atrial fibrillation, CAD, hypertension, AVR/MVR/TVR, dyslipidemia, prostate cancer/radiation proctitis and GI bleeding referred for discussion regarding LAAO. He is currently on eliquis and continues to have rectal bleeding. He underwent successful left atrial appendage occlusion utilizing WATCHMAN device and is on ASA 81mg daily. Last visit, we encouraged increase mobility and exercise. He recently wore a loop monitor for Dr. Anastasia Marshall which demonstrated an episode of AF with a 3.2 second pause. He continues to have recurrent prolonged pauses on monitoring. He presents after undergoing single chamber pacemaker; interrogation shows normal function.         PAST MEDICAL HISTORY     Past Medical History:   Diagnosis Date    Arthritis     Atrial fibrillation ([address]     CAD (coronary artery disease)     Chronic pain     legs/knee    GERD (gastroesophageal reflux disease)     Hx of carcinoma in situ of prostate     Hyperlipidemia     Hypertension     Insomnia     DEL (obstructive sleep apnea)     Radiation proctitis     Vitamin D deficiency            PAST SURGICAL HISTORY     Past Surgical History:   Procedure Laterality Date    COLONOSCOPY N/A 5/8/2020    COLONOSCOPY performed by Iza More MD at [address] [place] [address] COLONOSCOPY N/A 6/8/2020    COLONOSCOPY performed by Padmini Meng MD at [place] [address] N/A 11/23/2020    FLEXIBLE SIGMOIDOSCOPY WITH APC performed by Iza More MD at [address] [place] [address] HX AORTIC VALVE REPLACEMENT  2015    and mitral valve repair, left atrial cryo maze    HX HEENT      melanoma removed head    HX HERNIA REPAIR  2009    Right    HX HERNIA REPAIR      left inguinal hernia repair    HX HERNIA REPAIR Left 12/01/2016    lap left inguinal hernia repair with mesh    HX KNEE REPLACEMENT      Bilateral    HX ORTHOPAEDIC      BILATERAL KNEE REPLACEMENT    HX Shredding copy and completing task.   ORTHOPAEDIC      CARPEL TUNNEL REPAIR-RIGHT    HX OTHER SURGICAL  2015    closure of patent foramen ovale    HX OTHER SURGICAL  10/2020    watchman implant for afib / Dr. Bowen Chidester      42 radiation treatments          ALLERGIES     No Known Allergies       FAMILY HISTORY     Family History   Problem Relation Age of Onset    Cancer Mother     Cancer Father         prostrate    Cancer Brother         prostrate ca    Anesth Problems Neg Hx     negative for cardiac disease       SOCIAL HISTORY     Social History     Socioeconomic History    Marital status:    Tobacco Use    Smoking status: Never Smoker    Smokeless tobacco: Never Used   Substance and Sexual Activity    Alcohol use: Yes     Alcohol/week: 3.0 standard drinks     Types: 3 Cans of beer per week    Drug use: No    Sexual activity: Not Currently         MEDICATIONS     Current Outpatient Medications   Medication Sig    amLODIPine (NORVASC) 5 mg tablet take 1 tablet by mouth once daily --HOLD FOR SBP  OR LESS    furosemide (LASIX) 40 mg tablet take 1 tablet by mouth once daily (Patient taking differently: 20 mg. Patient decreased dose)    lisinopriL (PRINIVIL, ZESTRIL) 20 mg tablet Take 1 Tab by mouth daily.  aspirin delayed-release 81 mg tablet Take 81 mg by mouth daily.  polyvinyl alcohol (ARTIFICIAL TEARS, POLYVIN ALC,) 1.4 % ophthalmic solution Administer 1 Drop to both eyes as needed.  ipratropium (ATROVENT HFA) 17 mcg/actuation inhaler Take 2 Puffs by inhalation as needed.  ferrous sulfate 325 mg (65 mg iron) cpER Take 1 Tab by mouth every other day.  tamsulosin (FLOMAX) 0.4 mg capsule Take 0.8 mg by mouth two (2) times a day.  cholecalciferol (VITAMIN D3) 1,000 unit tablet Take 2,000 Units by mouth two (2) times a day.  GLUCOSAMINE HCL/CHONDR CHING A NA (GLUCOSAMINE-CHONDROITIN) 750-600 mg tab Take 1 Tab by mouth daily.     omega-3 fatty acids-vitamin e 1,000 mg cap Take 1 Cap by mouth every other day.  acetaminophen (TYLENOL) 325 mg tablet Take  by mouth every four (4) hours as needed for Pain.  multivitamin (ONE A DAY) tablet Take 1 Tab by mouth daily.  docusate sodium (COLACE) 100 mg capsule Take 100 mg by mouth two (2) times daily as needed.  finasteride (PROSCAR) 5 mg tablet Take 5 mg by mouth nightly.  pravastatin (PRAVACHOL) 40 mg tablet Take 40 mg by mouth nightly. No current facility-administered medications for this visit. I have reviewed the nurses notes, vitals, problem list, allergy list, medical history, family, social history and medications. REVIEW OF SYMPTOMS      General: Pt denies excessive weight gain or loss. Pt is able to conduct ADL's  HEENT: Denies blurred vision, headaches, hearing loss, epistaxis and difficulty swallowing. Respiratory: Denies cough, congestion, shortness of breath, ABDUL, wheezing or stridor. Cardiovascular: Denies precordial pain, palpitations, edema or PND  Gastrointestinal: Denies poor appetite, indigestion, abdominal pain or blood in stool  Genitourinary: Denies hematuria, dysuria, increased urinary frequency  Musculoskeletal: Denies joint pain or swelling from muscles or joints  Neurologic: Denies tremor, paresthesias, headache, or sensory motor disturbance  Psychiatric: Denies confusion, insomnia, depression  Integumentray: Denies rash, itching or ulcers. Hematologic: Denies easy bruising, bleeding       PHYSICAL EXAMINATION      Vitals: see vitals section  General: Well developed, in no acute distress. HEENT: No jaundice, oral mucosa moist, no oral ulcers  Neck: Supple, no stiffness, no lymphadenopathy, supple  Heart:  irreg irreg, no murmur, gallop or rub, no jugular venous distention  Respiratory: Clear bilaterally x 4, no wheezing or rales  Abdomen:   Soft, non-tender, bowel sounds are active. Extremities:  No edema, normal cap refill, no cyanosis.   Musculoskeletal: No clubbing, no deformities  Neuro: A&Ox3, speech clear, gait stable, cooperative, no focal neurologic deficits  Skin: Skin color is normal. No rashes or lesions. Non diaphoretic, moist.  Vascular: 2+ pulses symmetric in all extremities       DIAGNOSTIC DATA      EKG:     Visit Vitals  /72 (BP 1 Location: Left upper arm, BP Patient Position: Sitting)   Pulse 72   Resp 16   Ht 5' 8\" (1.727 m)   Wt 203 lb 12.8 oz (92.4 kg)   SpO2 97%   BMI 30.99 kg/m²          LABORATORY DATA      Lab Results   Component Value Date/Time    WBC 4.3 08/27/2021 12:00 AM    HGB 11.2 (L) 08/27/2021 12:00 AM    HCT 33.7 (L) 08/27/2021 12:00 AM    PLATELET 026 16/18/9832 12:00 AM    MCV 96 08/27/2021 12:00 AM      Lab Results   Component Value Date/Time    Sodium 142 08/27/2021 12:00 AM    Potassium 4.2 08/27/2021 12:00 AM    Chloride 104 08/27/2021 12:00 AM    CO2 27 08/27/2021 12:00 AM    Anion gap 3 (L) 11/28/2020 04:41 AM    Glucose 109 (H) 08/27/2021 12:00 AM    BUN 14 08/27/2021 12:00 AM    Creatinine 0.75 (L) 08/27/2021 12:00 AM    BUN/Creatinine ratio 19 08/27/2021 12:00 AM    GFR est AA 99 08/27/2021 12:00 AM    GFR est non-AA 86 08/27/2021 12:00 AM    Calcium 8.7 08/27/2021 12:00 AM    Bilirubin, total 0.5 07/24/2021 07:45 AM    Alk. phosphatase 66 07/24/2021 07:45 AM    Protein, total 6.8 07/24/2021 07:45 AM    Albumin 4.3 07/24/2021 07:45 AM    Globulin 3.5 11/28/2020 04:41 AM    A-G Ratio 1.1 11/28/2020 04:41 AM    ALT (SGPT) 10 07/24/2021 07:45 AM         ASSESSMENT/PLAN      1. Atrial fibrillation              A. CHADSVASC 4   B. Watchman  2. Hypertension  3. Dyslipidemia  4. Patent foramen ovale  5. Aortic stenosis s/p AVR  6. Mitral regurgitation s/p MVR  7. Tricuspid regurgitation s/p prosthetic valve  8. DEL               A. Noncompliant with CPAP  9. Radiation proctitis  10. GI bleeding history  11. CAD, native  15. Bradycardia  13. Pacemaker   A.  Single chamber - Continue monitoring in device clinic      Thank you, Dr. Ruma Hector for allowing me to participate in the care of this extraordinarily pleasant male. Please do not hesitate to contact me for further questions/concerns.          [address]  [facility], [facility], [address], [address] [place] [address], [facility]  [phone] / [phone] Fax   [phone] / [phone] Fax

## 2021-12-09 NOTE — TELEPHONE ENCOUNTER
Pt called upset that this has not been taken care of. Please review and call her back with an update. Pt disconnected call. Thanks.

## 2021-12-10 NOTE — TELEPHONE ENCOUNTER
I do not see this form in my in box.      I was not working in clinic last week.     Did this form go to another provider? Is this taken care of?    At least call [facility] to see what the status is and have them refax form if needed.

## 2021-12-10 NOTE — TELEPHONE ENCOUNTER
Called [facility] at telephone number [phone]. They have not received the order for the scooter. They are refaxing form to fax number on the east at fax number [phone], will look out for forms.

## 2021-12-29 ENCOUNTER — TELEPHONE (OUTPATIENT)
Dept: FAMILY MEDICINE | Facility: CLINIC | Age: 46
End: 2021-12-29
Payer: MEDICARE

## 2021-12-29 NOTE — TELEPHONE ENCOUNTER
Reason for Call:  Other     Detailed comments: pt  Dropped off a form 12/28 to be completed regarding her special diet it needs to be faxed by 0101 she put the fax number for the [facility] on the envelope she was told  is out of the Clinic she is hoping that someone can complete and send it to the [facility]. She would like a copy of the completed  form to be sent to her also.  Phone Number Patient can be reached at: Home number on file [phone] (home)    Best Time: anytime  Can we leave a detailed message on this number? YES    Call taken on 12/29/2021 at 8:05 AM by Aditi Howard

## 2021-12-29 NOTE — TELEPHONE ENCOUNTER
Patient is calling back asking about the forms, shes quite upset. She said they have to be in by the 1st of January.  I told her dr gonzalez is out and I believe only dr gonzalez can complete her paperwork    Forms placed in DOD inbox

## 2021-12-29 NOTE — TELEPHONE ENCOUNTER
Patient is calling again insisting the paperwork be signed and faxed by 1/1/2021. I told her  carlos is not in the clinic and id send it to the dod to see if it can get taken care of... she was angry again and hung up      Pt called again and is very angry and hung up on me as well      Need to know where she dropped off the form, there is no form in the clinic and there is no drop off form task. The pt stated her PCA dropped it off, we do not have it and there is no task. The pt was informed to ask her PCA, she got mad and hung up.

## 2021-12-29 NOTE — TELEPHONE ENCOUNTER
Travel questionnaire was asked. Verified that they have no signs of COVID-19 symptoms.    Patient dropped off Verification of Prescribed Diet for Dr. Munoz to fill out. Placed the original copies in the 's slot.    When forms are completed, patient would like it:    Fax to [phone] and mail the original to home address.     Ok to leave a detailed message if unable to get a hold of the Patient.    Please re-route task back to the  to shred the copied forms and complete the task. Thanks!

## 2021-12-30 NOTE — TELEPHONE ENCOUNTER
We always tell patients it can be 3 to 5 days for paperwork to be completed. She has been asking only since the 29th of dec, but keeps calling as she is a bit unsteady         however sheWhen patients call about forms please remind them about the days to complete policy. I will have to look at it tomorrow.

## 2021-12-31 ENCOUNTER — MEDICAL CORRESPONDENCE (OUTPATIENT)
Dept: HEALTH INFORMATION MANAGEMENT | Facility: CLINIC | Age: 46
End: 2021-12-31
Payer: MEDICARE

## 2022-01-03 ENCOUNTER — TELEPHONE (OUTPATIENT)
Dept: FAMILY MEDICINE | Facility: CLINIC | Age: 47
End: 2022-01-03
Payer: MEDICARE

## 2022-01-03 NOTE — TELEPHONE ENCOUNTER
Reason for Call:  Other paperwork    Detailed comments:  pt is asking about (diet money) paperwork her PCA  dropped off she needs it to be completed and faxed to the county I let the pt. Know the form was completed and faxed and a copy sent to her   one Number Patient can be reached at: Home number on file [phone] (home)    Best Time: anytime     Can we leave a detailed message on this number? YES    Call taken on 1/3/2022 at 11:46 AM by Aditi Howard

## 2022-01-10 ENCOUNTER — MEDICAL CORRESPONDENCE (OUTPATIENT)
Dept: HEALTH INFORMATION MANAGEMENT | Facility: CLINIC | Age: 47
End: 2022-01-10

## 2022-01-20 ENCOUNTER — MEDICAL CORRESPONDENCE (OUTPATIENT)
Dept: HEALTH INFORMATION MANAGEMENT | Facility: CLINIC | Age: 47
End: 2022-01-20
Payer: MEDICARE

## 2022-03-11 DIAGNOSIS — J30.89 PERENNIAL ALLERGIC RHINITIS: ICD-10-CM

## 2022-03-11 RX ORDER — MONTELUKAST SODIUM 10 MG/1
TABLET ORAL
Qty: 90 TABLET | Refills: 4 | OUTPATIENT
Start: 2022-03-11

## 2022-03-30 DIAGNOSIS — Z53.9 DIAGNOSIS NOT YET DEFINED: Primary | ICD-10-CM

## 2022-03-30 PROCEDURE — G0179 MD RECERTIFICATION HHA PT: HCPCS | Performed by: FAMILY MEDICINE

## 2022-04-29 DIAGNOSIS — K31.84 DIABETIC GASTROPARESIS (H): ICD-10-CM

## 2022-04-29 DIAGNOSIS — E11.43 DIABETIC GASTROPARESIS (H): ICD-10-CM

## 2022-05-02 RX ORDER — PANTOPRAZOLE SODIUM 40 MG/1
TABLET, DELAYED RELEASE ORAL
Qty: 90 TABLET | Refills: 0 | Status: SHIPPED | OUTPATIENT
Start: 2022-05-02 | End: 2022-07-14

## 2022-05-02 NOTE — TELEPHONE ENCOUNTER
"Last Written Prescription Date:  7/24/21  Last Fill Quantity: 90,  # refills: 2   Last office visit provider:  5/6/21     Requested Prescriptions   Pending Prescriptions Disp Refills     pantoprazole (PROTONIX) 40 MG EC tablet [Pharmacy Med Name: PANTOPRAZOLE 40MG TABLETS] 90 tablet 2     Sig: TAKE 1 TABLET(40 MG) BY MOUTH DAILY       PPI Protocol Passed - 5/2/2022  9:22 AM        Passed - Not on Clopidogrel (unless Pantoprazole ordered)        Passed - No diagnosis of osteoporosis on record        Passed - Recent (12 mo) or future (30 days) visit within the authorizing provider's specialty     Patient has had an office visit with the authorizing provider or a provider within the authorizing providers department within the previous 12 mos or has a future within next 30 days. See \"Patient Info\" tab in inbasket, or \"Choose Columns\" in Meds & Orders section of the refill encounter.              Passed - Medication is active on med list        Passed - Patient is age 18 or older        Passed - No active pregnacy on record        Passed - No positive pregnancy test in past 12 months             Tom Boogie RN 05/02/22 9:22 AM    "

## 2022-05-17 DIAGNOSIS — M17.10 UNILATERAL PRIMARY OSTEOARTHRITIS, UNSPECIFIED KNEE: ICD-10-CM

## 2022-05-19 RX ORDER — IBUPROFEN 800 MG/1
TABLET, FILM COATED ORAL
Qty: 90 TABLET | Refills: 3 | Status: SHIPPED | OUTPATIENT
Start: 2022-05-19

## 2022-05-19 NOTE — TELEPHONE ENCOUNTER
"Routing refill request to provider for review/approval because:  Labs not current:      Last Written Prescription Date:  5/6/21  Last Fill Quantity: 90,  # refills: 3   Last office visit provider:  5/6/21     Requested Prescriptions   Pending Prescriptions Disp Refills     ibuprofen (ADVIL/MOTRIN) 800 MG tablet [Pharmacy Med Name: IBUPROFEN 800MG TABLETS] 90 tablet 3     Sig: TAKE 1 TABLET(800 MG) BY MOUTH DAILY       NSAID Medications Failed - 5/17/2022  4:45 PM        Failed - Blood pressure under 140/90 in past 12 months     BP Readings from Last 3 Encounters:   05/06/21 130/79   10/08/20 139/80   07/17/20 117/78                 Failed - Normal ALT on file in past 12 months     Recent Labs   Lab Test 05/06/21  1602   ALT 14             Failed - Normal AST on file in past 12 months     Recent Labs   Lab Test 05/06/21  1602   AST 11             Failed - Recent (12 mo) or future (30 days) visit within the authorizing provider's specialty     Patient has had an office visit with the authorizing provider or a provider within the authorizing providers department within the previous 12 mos or has a future within next 30 days. See \"Patient Info\" tab in inbasket, or \"Choose Columns\" in Meds & Orders section of the refill encounter.              Failed - Normal CBC on file in past 12 months     Recent Labs   Lab Test 05/06/21  1602   WBC 12.0*   RBC 5.12   HGB 11.3*   HCT 37.2   *                 Failed - Normal serum creatinine on file in past 12 months     Recent Labs   Lab Test 05/06/21  1602   CR 0.64       Ok to refill medication if creatinine is low          Passed - Patient is age 6-64 years        Passed - Medication is active on med list        Passed - No active pregnancy on record        Passed - No positive pregnancy test in past 12 months             Brianna Arana, RN 05/18/22 7:40 PM  "

## 2022-06-07 ENCOUNTER — TRANSFERRED RECORDS (OUTPATIENT)
Dept: HEALTH INFORMATION MANAGEMENT | Facility: CLINIC | Age: 47
End: 2022-06-07
Payer: MEDICARE

## 2022-06-13 DIAGNOSIS — G25.81 RESTLESS LEGS SYNDROME (RLS): ICD-10-CM

## 2022-06-13 RX ORDER — PRAMIPEXOLE DIHYDROCHLORIDE 0.5 MG/1
TABLET ORAL
Qty: 180 TABLET | Refills: 3 | Status: SHIPPED | OUTPATIENT
Start: 2022-06-13

## 2022-06-13 NOTE — TELEPHONE ENCOUNTER
"Routing refill request to provider for review/approval because:  Labs not current:  Multiple  Patient needs to be seen because it has been more than 6 months since last office visit.  BP out of range    Last Written Prescription Date:  5/30/21  Last Fill Quantity: 180,  # refills: 3   Last office visit provider:  5/6/21     Requested Prescriptions   Pending Prescriptions Disp Refills     pramipexole (MIRAPEX) 0.5 MG tablet [Pharmacy Med Name: PRAMIPEXOLE 0.5MG TABLETS] 180 tablet 3     Sig: TAKE 1 TABLET(0.5 MG) BY MOUTH TWICE DAILY       Antiparkinson's Agents Protocol Failed - 6/13/2022  3:49 AM        Failed - Blood pressure under 140/90 in past 12 months     BP Readings from Last 3 Encounters:   05/06/21 130/79   10/08/20 139/80   07/17/20 117/78                 Failed - CBC on record in past 12 months     Recent Labs   Lab Test 05/06/21  1602   WBC 12.0*   RBC 5.12   HGB 11.3*   HCT 37.2   *                 Failed - ALT on record in past 12 months         Recent Labs   Lab Test 05/06/21  1602   ALT 14             Failed - Serum Creatinine on file in past 12 months     Recent Labs   Lab Test 05/06/21  1602   CR 0.64       Ok to refill medication if creatinine is low          Failed - Recent (6 mo) or future (30 days) visit within the authorizing provider's specialty     Patient had office visit in the last 6 months or has a visit in the next 30 days with authorizing provider or within the authorizing provider's specialty.  See \"Patient Info\" tab in inbasket, or \"Choose Columns\" in Meds & Orders section of the refill encounter.            Passed - Medication is active on med list        Passed - Patient is age 18 or older        Passed - No active pregnancy on record        Passed - No positive pregnancy test in the past 12 months             Caroline Soto RN 06/13/22 2:50 PM  "

## 2022-07-14 ENCOUNTER — OFFICE VISIT (OUTPATIENT)
Dept: FAMILY MEDICINE | Facility: CLINIC | Age: 47
End: 2022-07-14
Payer: MEDICARE

## 2022-07-14 VITALS
DIASTOLIC BLOOD PRESSURE: 83 MMHG | HEIGHT: 61 IN | BODY MASS INDEX: 55.32 KG/M2 | WEIGHT: 293 LBS | SYSTOLIC BLOOD PRESSURE: 134 MMHG | RESPIRATION RATE: 16 BRPM | HEART RATE: 99 BPM

## 2022-07-14 DIAGNOSIS — E11.43 DIABETIC GASTROPARESIS (H): ICD-10-CM

## 2022-07-14 DIAGNOSIS — Z12.11 COLON CANCER SCREENING: ICD-10-CM

## 2022-07-14 DIAGNOSIS — Z00.00 ENCOUNTER FOR SUBSEQUENT ANNUAL WELLNESS VISIT (AWV) IN MEDICARE PATIENT: ICD-10-CM

## 2022-07-14 DIAGNOSIS — Z23 ENCOUNTER FOR IMMUNIZATION: ICD-10-CM

## 2022-07-14 DIAGNOSIS — E55.9 VITAMIN D DEFICIENCY: ICD-10-CM

## 2022-07-14 DIAGNOSIS — Z00.00 ENCOUNTER FOR MEDICARE ANNUAL WELLNESS EXAM: ICD-10-CM

## 2022-07-14 DIAGNOSIS — Z12.31 ENCOUNTER FOR SCREENING MAMMOGRAM FOR BREAST CANCER: ICD-10-CM

## 2022-07-14 DIAGNOSIS — D50.9 MICROCYTIC ANEMIA: Primary | Chronic | ICD-10-CM

## 2022-07-14 DIAGNOSIS — G63 POLYNEUROPATHY ASSOCIATED WITH UNDERLYING DISEASE (H): ICD-10-CM

## 2022-07-14 DIAGNOSIS — F33.0 MAJOR DEPRESSIVE DISORDER, RECURRENT EPISODE, MILD (H): ICD-10-CM

## 2022-07-14 DIAGNOSIS — E66.01 CLASS 3 SEVERE OBESITY DUE TO EXCESS CALORIES WITH SERIOUS COMORBIDITY AND BODY MASS INDEX (BMI) OF 60.0 TO 69.9 IN ADULT (H): ICD-10-CM

## 2022-07-14 DIAGNOSIS — K31.84 DIABETIC GASTROPARESIS (H): ICD-10-CM

## 2022-07-14 DIAGNOSIS — E83.42 HYPOMAGNESEMIA: ICD-10-CM

## 2022-07-14 DIAGNOSIS — E66.813 CLASS 3 SEVERE OBESITY DUE TO EXCESS CALORIES WITH SERIOUS COMORBIDITY AND BODY MASS INDEX (BMI) OF 60.0 TO 69.9 IN ADULT (H): ICD-10-CM

## 2022-07-14 PROBLEM — E11.3299 NONPROLIFERATIVE DIABETIC RETINOPATHY (H): Status: ACTIVE | Noted: 2017-01-19

## 2022-07-14 LAB
ALBUMIN SERPL BCG-MCNC: 3.4 G/DL (ref 3.5–5.2)
ALP SERPL-CCNC: 88 U/L (ref 35–104)
ALT SERPL W P-5'-P-CCNC: 15 U/L (ref 10–35)
ANION GAP SERPL CALCULATED.3IONS-SCNC: 12 MMOL/L (ref 7–15)
AST SERPL W P-5'-P-CCNC: 15 U/L (ref 10–35)
BILIRUB SERPL-MCNC: 0.6 MG/DL
BUN SERPL-MCNC: 11.5 MG/DL (ref 6–20)
CALCIUM SERPL-MCNC: 9 MG/DL (ref 8.6–10)
CHLORIDE SERPL-SCNC: 99 MMOL/L (ref 98–107)
CREAT SERPL-MCNC: 0.5 MG/DL (ref 0.51–0.95)
DEPRECATED HCO3 PLAS-SCNC: 25 MMOL/L (ref 22–29)
ERYTHROCYTE [DISTWIDTH] IN BLOOD BY AUTOMATED COUNT: 16.4 % (ref 10–15)
FERRITIN SERPL-MCNC: 36 NG/ML (ref 6–175)
GFR SERPL CREATININE-BSD FRML MDRD: >90 ML/MIN/1.73M2
GLUCOSE SERPL-MCNC: 122 MG/DL (ref 70–99)
HCT VFR BLD AUTO: 43.8 % (ref 35–47)
HGB BLD-MCNC: 13.6 G/DL (ref 11.7–15.7)
LDLC SERPL DIRECT ASSAY-MCNC: 92 MG/DL
MAGNESIUM SERPL-MCNC: 1.9 MG/DL (ref 1.7–2.3)
MCH RBC QN AUTO: 22.8 PG (ref 26.5–33)
MCHC RBC AUTO-ENTMCNC: 31.1 G/DL (ref 31.5–36.5)
MCV RBC AUTO: 73 FL (ref 78–100)
PLATELET # BLD AUTO: 496 10E3/UL (ref 150–450)
POTASSIUM SERPL-SCNC: 4.3 MMOL/L (ref 3.4–5.3)
PROT SERPL-MCNC: 7.8 G/DL (ref 6.4–8.3)
RBC # BLD AUTO: 5.97 10E6/UL (ref 3.8–5.2)
SODIUM SERPL-SCNC: 136 MMOL/L (ref 136–145)
WBC # BLD AUTO: 12.7 10E3/UL (ref 4–11)

## 2022-07-14 PROCEDURE — 99214 OFFICE O/P EST MOD 30 MIN: CPT | Mod: 25 | Performed by: FAMILY MEDICINE

## 2022-07-14 PROCEDURE — 91305 COVID-19,PF,PFIZER (12+ YRS): CPT | Performed by: FAMILY MEDICINE

## 2022-07-14 PROCEDURE — 36415 COLL VENOUS BLD VENIPUNCTURE: CPT | Performed by: FAMILY MEDICINE

## 2022-07-14 PROCEDURE — 83735 ASSAY OF MAGNESIUM: CPT | Performed by: FAMILY MEDICINE

## 2022-07-14 PROCEDURE — G0009 ADMIN PNEUMOCOCCAL VACCINE: HCPCS | Performed by: FAMILY MEDICINE

## 2022-07-14 PROCEDURE — 80053 COMPREHEN METABOLIC PANEL: CPT | Performed by: FAMILY MEDICINE

## 2022-07-14 PROCEDURE — 85027 COMPLETE CBC AUTOMATED: CPT | Performed by: FAMILY MEDICINE

## 2022-07-14 PROCEDURE — G0439 PPPS, SUBSEQ VISIT: HCPCS | Performed by: FAMILY MEDICINE

## 2022-07-14 PROCEDURE — 83721 ASSAY OF BLOOD LIPOPROTEIN: CPT | Performed by: FAMILY MEDICINE

## 2022-07-14 PROCEDURE — 82306 VITAMIN D 25 HYDROXY: CPT | Performed by: FAMILY MEDICINE

## 2022-07-14 PROCEDURE — 82728 ASSAY OF FERRITIN: CPT | Performed by: FAMILY MEDICINE

## 2022-07-14 PROCEDURE — 90677 PCV20 VACCINE IM: CPT | Performed by: FAMILY MEDICINE

## 2022-07-14 PROCEDURE — 0054A COVID-19,PF,PFIZER (12+ YRS): CPT | Performed by: FAMILY MEDICINE

## 2022-07-14 RX ORDER — METOCLOPRAMIDE 5 MG/1
5 TABLET ORAL
COMMUNITY
Start: 2022-06-16 | End: 2022-07-22

## 2022-07-14 RX ORDER — SERTRALINE HYDROCHLORIDE 100 MG/1
50 TABLET, FILM COATED ORAL
COMMUNITY
Start: 2022-06-16 | End: 2022-07-14

## 2022-07-14 RX ORDER — INSULIN DEGLUDEC 200 U/ML
74 INJECTION, SOLUTION SUBCUTANEOUS AT BEDTIME
Qty: 15 ML | COMMUNITY
Start: 2022-07-14

## 2022-07-14 RX ORDER — BUPROPION HYDROCHLORIDE 150 MG/1
150 TABLET ORAL
COMMUNITY
Start: 2022-06-16 | End: 2022-07-14

## 2022-07-14 RX ORDER — SEMAGLUTIDE 1.34 MG/ML
1 INJECTION, SOLUTION SUBCUTANEOUS
COMMUNITY
Start: 2022-06-16

## 2022-07-14 RX ORDER — OMEPRAZOLE 40 MG/1
40 CAPSULE, DELAYED RELEASE ORAL
COMMUNITY
Start: 2022-06-16 | End: 2022-07-22

## 2022-07-14 RX ORDER — MONTELUKAST SODIUM 10 MG/1
10 TABLET ORAL DAILY
COMMUNITY
Start: 2021-05-06 | End: 2022-07-14

## 2022-07-14 RX ORDER — UBIQUINOL 100 MG
CAPSULE ORAL
COMMUNITY
Start: 2021-12-06

## 2022-07-14 ASSESSMENT — ENCOUNTER SYMPTOMS
CONSTIPATION: 0
CHILLS: 0
SHORTNESS OF BREATH: 0
HEADACHES: 0
PALPITATIONS: 0
BREAST MASS: 0
WEAKNESS: 0
DYSURIA: 0
DIARRHEA: 0
HEMATURIA: 0
NAUSEA: 0
NERVOUS/ANXIOUS: 0
JOINT SWELLING: 1
SORE THROAT: 0
ABDOMINAL PAIN: 0
FEVER: 0
EYE PAIN: 0
DIZZINESS: 0
ARTHRALGIAS: 1
FREQUENCY: 0
MYALGIAS: 0
PARESTHESIAS: 0
COUGH: 0
HEMATOCHEZIA: 0
HEARTBURN: 1

## 2022-07-14 ASSESSMENT — ACTIVITIES OF DAILY LIVING (ADL)
CURRENT_FUNCTION: BATHING REQUIRES ASSISTANCE
CURRENT_FUNCTION: SHOPPING REQUIRES ASSISTANCE
CURRENT_FUNCTION: LAUNDRY REQUIRES ASSISTANCE
CURRENT_FUNCTION: PREPARING MEALS REQUIRES ASSISTANCE

## 2022-07-14 ASSESSMENT — PATIENT HEALTH QUESTIONNAIRE - PHQ9
10. IF YOU CHECKED OFF ANY PROBLEMS, HOW DIFFICULT HAVE THESE PROBLEMS MADE IT FOR YOU TO DO YOUR WORK, TAKE CARE OF THINGS AT HOME, OR GET ALONG WITH OTHER PEOPLE: NOT DIFFICULT AT ALL
SUM OF ALL RESPONSES TO PHQ QUESTIONS 1-9: 9
SUM OF ALL RESPONSES TO PHQ QUESTIONS 1-9: 9

## 2022-07-14 NOTE — PROGRESS NOTES
"  {PROVIDER CHARTING PREFERENCE:821168}    Boris Gonzales is a 46 year old accompanied by her self, presenting for the following health issues:  Physical      Healthy Habits:     In general, how would you rate your overall health?  Fair    Frequency of exercise:  None    Do you usually eat at least 4 servings of fruit and vegetables a day, include whole grains    & fiber and avoid regularly eating high fat or \"junk\" foods?  Yes    Taking medications regularly:  Yes    Ability to successfully perform activities of daily living:  Shopping requires assistance, preparing meals requires assistance, bathing requires assistance and laundry requires assistance    Home Safety:  No safety concerns identified    Hearing Impairment:  No hearing concerns    In the past 6 months, have you been bothered by leaking of urine?  No    In general, how would you rate your overall mental or emotional health?  Fair      PHQ-2 Total Score: 2    Additional concerns today:  No       {SUPERLIST (Optional):801698}  {additonal problems for provider to add (Optional):011658}    Review of Systems   Constitutional: Negative for chills and fever.   HENT: Negative for congestion, ear pain, hearing loss and sore throat.    Eyes: Negative for pain and visual disturbance.   Respiratory: Negative for cough and shortness of breath.    Cardiovascular: Positive for peripheral edema. Negative for chest pain and palpitations.   Gastrointestinal: Positive for heartburn. Negative for abdominal pain, constipation, diarrhea, hematochezia and nausea.   Breasts:  Negative for tenderness, breast mass and discharge.   Genitourinary: Negative for dysuria, frequency, genital sores, hematuria, pelvic pain, urgency, vaginal bleeding and vaginal discharge.   Musculoskeletal: Positive for arthralgias and joint swelling. Negative for myalgias.   Skin: Negative for rash.   Neurological: Negative for dizziness, weakness, headaches and paresthesias. " "  Psychiatric/Behavioral: Negative for mood changes. The patient is not nervous/anxious.       {ROS COMP (Optional):692530}      Objective    /83 (BP Location: Right arm, Patient Position: Sitting, Cuff Size: Adult Large)   Pulse 99   Resp 16   Ht 1.54 m (5' 0.63\")   Wt (!) 159.2 kg (351 lb)   LMP  (LMP Unknown)   BMI 67.13 kg/m    Body mass index is 67.13 kg/m .  Physical Exam   {Exam List (Optional):042362}    {Diagnostic Test Results (Optional):939054}    {AMBULATORY ATTESTATION (Optional):127936}            .  ..  Answers for HPI/ROS submitted by the patient on 7/14/2022  If you checked off any problems, how difficult have these problems made it for you to do your work, take care of things at home, or get along with other people?: Not difficult at all  PHQ9 TOTAL SCORE: 9      "

## 2022-07-14 NOTE — LETTER
July 22, 2022      Janet Rdz  [address]  [place] [address]        Dear ,    We are writing to inform you of your test results.    Your vitamin D is still low- be sure you take your supplement once a week    Normal liver and kidney tests     Normal cholesterol     Normal iron level     Resulted Orders   Comprehensive metabolic panel (BMP + Alb, Alk Phos, ALT, AST, Total. Bili, TP)   Result Value Ref Range    Sodium 136 136 - 145 mmol/L    Potassium 4.3 3.4 - 5.3 mmol/L    Creatinine 0.50 (L) 0.51 - 0.95 mg/dL    Urea Nitrogen 11.5 6.0 - 20.0 mg/dL    Chloride 99 98 - 107 mmol/L    Carbon Dioxide (CO2) 25 22 - 29 mmol/L    Anion Gap 12 7 - 15 mmol/L    Glucose 122 (H) 70 - 99 mg/dL    Calcium 9.0 8.6 - 10.0 mg/dL    Protein Total 7.8 6.4 - 8.3 g/dL    Albumin 3.4 (L) 3.5 - 5.2 g/dL    Bilirubin Total 0.6 <=1.2 mg/dL    Alkaline Phosphatase 88 35 - 104 U/L    AST 15 10 - 35 U/L    ALT 15 10 - 35 U/L    GFR Estimate >90 >60 mL/min/1.73m2      Comment:      Effective December 21, 2021 eGFRcr in adults is calculated using the 2021 CKD-EPI creatinine equation which includes age and gender (Yin et al., NEJ, DOI: 10.1056/ONIEqz9613062)   LDL cholesterol direct   Result Value Ref Range    LDL Cholesterol Direct 92 <100 mg/dL      Comment:      Age 2-19 years:  Desirable: 0-110 mg/dL   Borderline high: 110-129 mg/dL   High: >= 130 mg/dL    Age 20 years and older:  Desirable: <100mg/dL  Above desirable: 100-129 mg/dL   Borderline high: 130-159 mg/dL   High: 160-189 mg/dL   Very high: >= 190 mg/dL   CBC with platelets   Result Value Ref Range    WBC Count 12.7 (H) 4.0 - 11.0 10e3/uL    RBC Count 5.97 (H) 3.80 - 5.20 10e6/uL    Hemoglobin 13.6 11.7 - 15.7 g/dL    Hematocrit 43.8 35.0 - 47.0 %    MCV 73 (L) 78 - 100 fL    MCH 22.8 (L) 26.5 - 33.0 pg    MCHC 31.1 (L) 31.5 - 36.5 g/dL    RDW 16.4 (H) 10.0 - 15.0 %    Platelet Count 496 (H) 150 - 450 10e3/uL   Ferritin   Result Value Ref Range     Ferritin 36 6 - 175 ng/mL   Vitamin D Deficiency   Result Value Ref Range    Vitamin D, Total (25-Hydroxy) 20 20 - 75 ug/L    Narrative    Season, race, dietary intake, and treatment affect the concentration of 25-hydroxy-Vitamin D. Values may decrease during winter months and increase during summer months. Values 20-29 ug/L may indicate Vitamin D insufficiency and values <20 ug/L may indicate Vitamin D deficiency.    Vitamin D determination is routinely performed by an immunoassay specific for 25 hydroxyvitamin D3.  If an individual is on vitamin D2(ergocalciferol) supplementation, please specify 25 OH vitamin D2 and D3 level determination by LCMSMS test VITD23.     Magnesium   Result Value Ref Range    Magnesium 1.9 1.7 - 2.3 mg/dL       If you have any questions or concerns, please call the clinic at the number listed above.       Sincerely,      Giselle Munoz MD

## 2022-07-14 NOTE — PATIENT INSTRUCTIONS
Patient Education   Personalized Prevention Plan  You are due for the preventive services outlined below.  Your care team is available to assist you in scheduling these services.  If you have already completed any of these items, please share that information with your care team to update in your medical record.  Health Maintenance Due   Topic Date Due     Diabetic Foot Exam  Never done     Discuss Advance Care Planning  Never done     Depression Action Plan  Never done     Colorectal Cancer Screening  Never done     Pneumococcal Vaccine (2 - PCV) 11/03/2000     Eye Exam  08/23/2019     Mammogram  11/14/2019     Kidney Microalbumin Urine Test  03/21/2020     Cholesterol Lab  02/13/2021     COVID-19 Vaccine (3 - Booster for Pfizer series) 03/30/2022     Basic Metabolic Panel  05/06/2022     Your Health Risk Assessment indicates you feel you are not in good health    A healthy lifestyle helps keep the body fit and the mind alert. It helps protect you from disease, helps you fight disease, and helps prevent chronic disease (disease that doesn't go away) from getting worse. This is important as you get older and begin to notice twinges in muscles and joints and a decline in the strength and stamina you once took for granted. A healthy lifestyle includes good healthcare, good nutrition, weight control, recreation, and regular exercise. Avoid harmful substances and do what you can to keep safe. Another part of a healthy lifestyle is stay mentally active and socially involved.    Good healthcare     Have a wellness visit every year.     If you have new symptoms, let us know right away. Don't wait until the next checkup.     Take medicines exactly as prescribed and keep your medicines in a safe place. Tell us if your medicine causes problems.   Healthy diet and weight control     Eat 3 or 4 small, nutritious, low-fat, high-fiber meals a day. Include a variety of fruits, vegetables, and whole-grain foods.     Make sure you  get enough calcium in your diet. Calcium, vitamin D, and exercise help prevent osteoporosis (bone thinning).     If you live alone, try eating with others when you can. That way you get a good meal and have company while you eat it.     Try to keep a healthy weight. If you eat more calories than your body uses for energy, it will be stored as fat and you will gain weight.     Recreation   Recreation is not limited to sports and team events. It includes any activity that provides relaxation, interest, enjoyment, and exercise. Recreation provides an outlet for physical, mental, and social energy. It can give a sense of worth and achievement. It can help you stay healthy.    Mental Exercise and Social Involvement  Mental and emotional health is as important as physical health. Keep in touch with friends and family. Stay as active as possible. Continue to learn and challenge yourself.   Things you can do to stay mentally active are:    Learn something new, like a foreign language or musical instrument.     Play SCRABBLE or do crossword puzzles. If you cannot find people to play these games with you at home, you can play them with others on your computer through the Internet.     Join a games club--anything from card games to chess or checkers or lawn bowling.     Start a new hobby.     Go back to school.     Volunteer.     Read.   Keep up with world events.    Exercise for a Healthier Heart  You may wonder how you can improve the health of your heart. If you re thinking about exercise, you re on the right track. You don t need to become an athlete. But you do need a certain amount of brisk exercise to help strengthen your heart. If you have been diagnosed with a heart condition, your healthcare provider may advise exercise to help stabilize your condition. To help make exercise a habit, choose safe, fun activities.      Exercise with a friend. When activity is fun, you're more likely to stick with it.   Before you  start  Check with your healthcare provider before starting an exercise program. This is especially important if you have not been active for a while. It's also important if you have a long-term (chronic) health problem such as heart disease, diabetes, or obesity. Or if you are at high risk for having these problems.   Why exercise?  Exercising regularly offers many healthy rewards. It can help you do all of the following:     Improve your blood cholesterol level to help prevent further heart trouble    Lower your blood pressure to help prevent a stroke or heart attack    Control diabetes, or reduce your risk of getting this disease    Improve your heart and lung function    Reach and stay at a healthy weight    Make your muscles stronger so you can stay active    Prevent falls and fractures by slowing the loss of bone mass (osteoporosis)    Manage stress better    Reduce your blood pressure    Improve your sense of self and your body image  Exercise tips      Ease into your routine. Set small goals. Then build on them. If you are not sure what your activity level should be, talk with your healthcare provider first before starting an exercise routine.    Exercise on most days. Aim for a total of 150 minutes (2 hours and 30 minutes) or more of moderate-intensity aerobic activity each week. Or 75 minutes (1 hour and 15 minutes) or more of vigorous-intensity aerobic activity each week. Or try for a combination of both. Moderate activity means that you breathe heavier and your heart rate increases but you can still talk. Think about doing 40 minutes of moderate exercise, 3 to 4 times a week. For best results, activity should last for about 40 minutes to lower blood pressure and cholesterol. It's OK to work up to the 40-minute period over time. Examples of moderate-intensity activity are walking 1 mile in 15 minutes. Or doing 30 to 45 minutes of yard work.    Step up your daily activity level.  Along with your exercise  program, try being more active the whole day. Walk instead of drive. Or park further away so that you take more steps each day. Do more household tasks or yard work. You may not be able to meet the advised mount of physical activity. But doing some moderate- or vigorous-intensity aerobic activity can help reduce your risk for heart disease. Your healthcare provider can help you figure out what is best for you.    Choose 1 or more activities you enjoy.  Walking is one of the easiest things you can do. You can also try swimming, riding a bike, dancing, or taking an exercise class.    When to call your healthcare provider  Call your healthcare provider if you have any of these:     Chest pain or feel dizzy or lightheaded    Burning, tightness, pressure, or heaviness in your chest, neck, shoulders, back, or arms    Abnormal shortness of breath    More joint or muscle pain    A very fast or irregular heartbeat (palpitations)  [employer] last reviewed this educational content on 7/1/2019 2000-2021 [employer]. All rights reserved. This information is not intended as a substitute for professional medical care. Always follow your healthcare professional's instructions.        Activities of Daily Living    Your Health Risk Assessment indicates you have difficulties with activities of daily living such as housework, bathing, preparing meals, taking medication, etc. Please make a follow up appointment for us to address this issue in more detail.  Your Health Risk Assessment indicates you feel you are not in good emotional health.    Recreation   Recreation is not limited to sports and team events. It includes any activity that provides relaxation, interest, enjoyment, and exercise. Recreation provides an outlet for physical, mental, and social energy. It can give a sense of worth and achievement. It can help you stay healthy.    Mental Exercise and Social Involvement  Mental and emotional health is as important  "as physical health. Keep in touch with friends and family. Stay as active as possible. Continue to learn and challenge yourself.   Things you can do to stay mentally active are:    Learn something new, like a foreign language or musical instrument.     Play SCRABBLE or do crossword puzzles. If you cannot find people to play these games with you at home, you can play them with others on your computer through the Internet.     Join a games club--anything from card games to chess or checkers or lawn bowling.     Start a new hobby.     Go back to school.     Volunteer.     Read.   Keep up with world events.    Depression and Suicide in Older Adults    Nearly 2 million older Americans have some type of depression. Some of them even take their own lives. Yet depression among older adults is often ignored. Learn the warning signs. You may help spare a loved one needless pain. You may also save a life.   What is depression?  Depression is a common and serious illness that affects the way you think and feel. It is not a normal part of aging, nor is it a sign of weakness, a character flaw, or something you can snap out of. Most people with depression need treatment to get better. The most common symptom is a feeling of deep sadness. People who are depressed also may seem tired and listless. And nothing seems to give them pleasure. It s normal to grieve or be sad sometimes. But sadness lessens or passes with time. Depression rarely goes away or improves on its own. A person with clinical depression can't \"snap out of it.\" Other symptoms of depression are:     Sleeping more or less than normal    Eating more or less than normal    Having headaches, stomachaches, or other pains that don t go away    Feeling nervous,  empty,  or worthless    Crying a great deal    Thinking or talking about suicide or death    Loss of interest in activities previously enjoyed    Social isolation    Feeling confused or forgetful  What causes " it?  The causes of depression aren t fully known. But it is thought to result from a complex blend of these factors:     Biochemistry. Certain chemicals in the brain play a role.    Genes. Depression does run in families.    Life stress. Life stresses can also trigger depression in some people. Older adults often face many stressors, such as death of friends or a spouse, health problems, and financial concerns.    Chronic conditions. This includes conditions such as diabetes, heart disease, or cancer. These can cause symptoms of depression. Medicine side effects can cause changes in thoughts and behaviors.  How you can help  Often, depressed people may not want to ask for help. When they do, they may be ignored. Or, they may receive the wrong treatment. You can help by showing parents and older friends love and support. If they seem depressed, don t lecture the person, ignore the symptoms, or discount the symptoms as a  normal  part of aging -which they are not. Get involved, listen, and show interest and support.   Help them understand that depression is a treatable illness. Tell them you can help them find the right treatment. Offer to go to their healthcare provider's appointment with them for support when the symptoms are discussed. With their approval, contact a local mental health center, social service agency, or hospital about services.   You can be an advocate for him or her at healthcare appointments. Many older adults have chronic illnesses that can cause symptoms of depression. Medicine side effects can change thoughts and behaviors. You can help make sure that the healthcare provider looks at all of these factors. He or she should refer your family member or friend to a mental healthcare provider when needed. in some cases, untreated depression can lead to a misdiagnosis. A person may be diagnosed with a brain disorder such as dementia. If the healthcare provider does not take the issue of depression  seriously, help your family member or friend to find another provider.   Don't be afraid to ask  If you think an older person you care about could be suicidal, ask,  Have you thought about suicide?  Most people will tell you the truth. If they say  yes,  they may already have a plan for how and when they will attempt it. Find out as much as you can. The more detailed the plan, and the easier it is to carry out, the more danger the person is in right now. Tell the person you are there for them and do not want them to harm him or herself. Don't wait to get help for the person. Call the person's healthcare provider, local hospital, or emergency services.   To learn more    National Suicide Prevention Lifeline (crisis hotline) 650-132-ONMD (326-414-3371)    National [place] of Mental Dqfboc584-076-6559hjo.[facility].nih.gov    National [place] on Mental Wecvrnm671-409-6152vzn.gerardo.org    Mental Health Knqqhaw589-111-7145cpz.[facility].org    National Suicide Cvoisys605-OSBUJCR (114-496-6166)    Call 911  Never leave the person alone. A person who is actively suicidal needs psychiatric care right away. They will need constant supervision. Never leave the person out of sight. Call 911 or the national 24-hour suicide crisis hotline at 838-027-HVUA (652-207-4366). You can also take the person to the closest emergency room.   [employer] last reviewed this educational content on 5/1/2020 2000-2021 [employer]. All rights reserved. This information is not intended as a substitute for professional medical care. Always follow your healthcare professional's instructions.

## 2022-07-14 NOTE — PROGRESS NOTES
"SUBJECTIVE:   Mariola Rdz is a 46 year old female who presents for Preventive Visit.    [facility] Health Maintenance Exam  [facility]  Phone : none    Assessment/Plan     1. Annual Wellness Visit as outlined below.   Health maintenance discussed as appropriate for age and risk factors including:  Nutrition, exercise, alcohol use, tobacco use, safe sexual practices, dental health.  Cancer screening assessed and ordered as indicated. Routine labs as outlined below based on age and risk factors reviewed today. Immunizations reviewed and updated.       (D50.9) Microcytic anemia  (primary encounter diagnosis)  Comment: Endorses intermittent fatigue and exhaustion, sometimes dizzy while at rest.   Plan: CBC with platelets, Ferritin  -continue iron supplement    (G63) Polyneuropathy associated with underlying disease (H)  Comment: Reports pain has been managed okay, endorses right knee pain \"that isn't new\" and feels like she mckinley with occasional ibuprofen. Tries to deal the pain by exercise, walking, stretches, or soaks in the tub. Scooter helps with mobility.  Plan: continue as above, stable.    (F33.0) Major depressive disorder, recurrent episode, mild (H)  Comment: States \"I have a lot of stress but I don't tell people about it. I bottle it up until I am ready\". Feels safe. Denies self harm or suicidal thoughts/ideation.   Plan: bupropion 150mg, sertraline 50mg    (E11.43,  K31.84) Diabetic gastroparesis (H)  Comment: reports that episodes nausea/spitting/throwing up are getting less frequent. PRNs help. Some foods set better with her stomach than others  Plan: metoclopramide 5mg PRN, phenergan 12.5 mg PRN     (E66.01,  Z68.44) Class 3 severe obesity due to excess calories with serious comorbidity and body mass index (BMI) of 60.0 to 69.9 in adult (H)  Comment: Roommate cooks meals for her, has sandwiches or premade foods. Difficulty coordinating rides to grocery store, so someone else " "has to do it. States she has sweets and pop at her bedside.  Plan: counseled lifestyle modifications    (E11.8,  E11.65,  Z79.4) Uncontrolled type 2 diabetes mellitus with complication, with long-term current use of insulin (H)  Comment: Managed by Shivam Lyon- next appointment scheduled 12/5/22. A1C trending down, 6/16/22 9.7. Taking diabetes medications when she remembers to, but probably 5-6 days a week. Only taking metformin 1000mg every day. Taking fasting blood sugars almost every day, have varied 60-200s. States she has sweets and pop at her bedside. Sometimes wakes up 2-3 days a week experiencing light headedness, diaphoresis, shaking- low blood sugars accompany this.  Plan: Comprehensive metabolic panel (BMP + Alb, Alk         Phos, ALT, AST, Total. Bili, TP), Adult Eye         Referral, LDL cholesterol direct, CANCELED:         Albumin Random Urine Quantitative with Creat         Ratio      (Z12.31) Encounter for screening mammogram for breast cancer  Comment:   Plan: *MA Screening Digital Bilateral            (Z12.11) Colon cancer screening  Comment:   Plan: COLOGUARD(EXACT SCIENCES)            (Z23) Encounter for immunization  Comment:   Plan: PNEUMOCOCCAL 20 VALENT CONJUGATE (PREVNAR 20)            (Z00.00) Encounter for subsequent annual wellness visit (AWV) in Medicare patient  Comment:   Plan: REVIEW OF HEALTH MAINTENANCE PROTOCOL ORDERS            (E55.9) Vitamin D deficiency  Comment:   Plan: Vitamin D Deficiency            (E83.42) Hypomagnesemia  Comment:   Plan: Magnesium            (Z00.00) Encounter for Medicare annual wellness exam  Comment:  Plan:        Return in about 4 months (around 11/14/2022).    HPI:     Chief Complaint   Patient presents with     Physical       Mariolamelba Rdz is a 46 year old female and is here for a comprehensive health maintenance exam.     Reviewed medications with Janet.  She reports \"I don't know my medications to tell you right now, but if I see it on " "the bottle then I know what they are for.\" Doesn't remember doses, states \"whatever is in the computer is accurate\". Reports pain has been managed okay, endorses right knee pain \"that isn't new\" and feels like she mckinley with occasional ibuprofen. Tries to deal the pain by exercise, walking, stretches, or soaks in the tub. Nausea is \"sometimes an issue\" but able to resolve by taking prns. Reports taking diabetes medications when she remembers to, but probably 5-6 days a week. Reports taking fasting blood sugars almost every day, have varied 60-200s. States she has sweets and pop at her bedside. Sometimes wakes up 2-3 days a week experiencing light headedness, diaphoresis, shaking- low blood sugars accompany this. Roommate cooks meals for her, has sandwiches or premade foods.    Other concerns today:   Wants refills of rx because she is out of town Aug 2-15 and wants to make sure she has her insulin.    History summarized from1-2:endocrine 6/16/22- visit prior was 10 months and stopped her meds.  Restarting metformin, ozempic increased to 1mg weekly.    Old Records-1: Outside allergies, meds, problems and immunizations were reconciled as needed from [facility]  Radiology tests reviewed-1: last mammo 2018  Lab tests reviewed-1: a1c 9.7    Review of Systems   Complete ROS including General, HEENT, CV, Pulmonary, GI, , Genital, Neuro, Psych, MSK, Heme, Endocrine all within normal except as noted in the HPI.      Prevention of Osteoporosis:vitamin D supplement?  Minimal dairy.    Last Dexa:na    Cancer Screening:  Hemoccults/Colonoscopy: due  Mammogram:  due  Pap Smear:  Normal 2019- repeat 2024  Lung Cancer: na      Wt Readings from Last 3 Encounters:   07/14/22 (!) 159.2 kg (351 lb)   05/06/21 (!) 160.7 kg (354 lb 4 oz)   10/08/20 (!) 155.1 kg (342 lb)         Complete physical exam including:  General, HEENT, Neck, back, CV, Pulmonary, Abdominal, extremities, skin, neuro, psych, lymph exams all within " "normal.    Abnormalities include:  Grossly normal exam.  Pt declined pelivc and breast exam today     Patient has been advised of split billing requirements and indicates understanding: Yes  Are you in the first 12 months of your Medicare coverage?  No    Healthy Habits:     In general, how would you rate your overall health?  Fair    Frequency of exercise:  None    Do you usually eat at least 4 servings of fruit and vegetables a day, include whole grains    & fiber and avoid regularly eating high fat or \"junk\" foods?  Yes    Taking medications regularly:  Yes    Ability to successfully perform activities of daily living:  Shopping requires assistance, preparing meals requires assistance, bathing requires assistance and laundry requires assistance    Home Safety:  No safety concerns identified    Hearing Impairment:  No hearing concerns    In the past 6 months, have you been bothered by leaking of urine?  No    In general, how would you rate your overall mental or emotional health?  Fair      PHQ-2 Total Score: 2    Additional concerns today:  No    Do you feel safe in your environment? Yes    Have you ever done Advance Care Planning? (For example, a Health Directive, POLST, or a discussion with a medical provider or your loved ones about your wishes): No, advance care planning information given to patient to review.  Patient declined advance care planning discussion at this time.      Fall risk:      Cognitive Screening   1) Repeat 3 items   2) Clock draw:  3) 3 item recall:   Results: pt refuse.    Mini-CogTM Copyright KASSIE Blanton. Licensed by the author for use in [facility]; reprinted with permission (john@.[email or web address]). All rights reserved.      Do you have sleep apnea, excessive snoring or daytime drowsiness?: no    Reviewed and updated as needed this visit by clinical staff   Tobacco  Allergies  Meds  Problems  Med Hx  Surg Hx  Fam Hx            Reviewed and updated as needed this visit by " Provider   Tobacco  Allergies  Meds  Problems  Med Hx  Surg Hx  Fam Hx           Social History     Tobacco Use     Smoking status: Never Smoker     Smokeless tobacco: Never Used     Tobacco comment: family members smoke outside   Substance Use Topics     Alcohol use: No       Alcohol Use 7/14/2022   Prescreen: >3 drinks/day or >7 drinks/week? No         Current providers sharing in care for this patient include:   Patient Care Team:  Giselle Munoz MD as PCP - General (Family Practice)  Obdulio Sutton, PharmD as Pharmacist (Pharmacist)  Giselle Munoz MD as Assigned PCP    The following health maintenance items are reviewed in Epic and correct as of today:  Health Maintenance Due   Topic Date Due     DIABETIC FOOT EXAM  Never done     ADVANCE CARE PLANNING  Never done     DEPRESSION ACTION PLAN  Never done     COLORECTAL CANCER SCREENING  Never done     Pneumococcal Vaccine: Pediatrics (0 to 5 Years) and At-Risk Patients (6 to 64 Years) (2 - PCV) 11/03/2000     EYE EXAM  08/23/2019     MAMMO SCREENING  11/14/2019     MICROALBUMIN  03/21/2020     LIPID  02/13/2021     COVID-19 Vaccine (3 - Booster for Pfizer series) 03/30/2022     BMP  05/06/2022       Breast CA Risk Assessment (FHS-7) 7/14/2022   Do you have a family history of breast, colon, or ovarian cancer? No / Unknown       Mammogram Screening: Recommended annual mammography  Pertinent mammograms are reviewed under the imaging tab.    Review of Systems   Constitutional: Negative for chills and fever.   HENT: Negative for congestion, ear pain, hearing loss and sore throat.    Eyes: Negative for pain and visual disturbance.   Respiratory: Negative for cough and shortness of breath.    Cardiovascular: Positive for peripheral edema. Negative for chest pain and palpitations.   Gastrointestinal: Positive for heartburn. Negative for abdominal pain, constipation, diarrhea, hematochezia and nausea.   Breasts:  Negative for tenderness, breast mass and  "discharge.   Genitourinary: Negative for dysuria, frequency, genital sores, hematuria, pelvic pain, urgency, vaginal bleeding and vaginal discharge.   Musculoskeletal: Positive for arthralgias and joint swelling. Negative for myalgias.   Skin: Negative for rash.   Neurological: Negative for dizziness, weakness, headaches and paresthesias.   Psychiatric/Behavioral: Negative for mood changes. The patient is not nervous/anxious.      OBJECTIVE:   /83 (BP Location: Right arm, Patient Position: Sitting, Cuff Size: Adult Large)   Pulse 99   Resp 16   Ht 1.54 m (5' 0.63\")   Wt (!) 159.2 kg (351 lb)   LMP  (LMP Unknown)   BMI 67.13 kg/m   Estimated body mass index is 67.13 kg/m  as calculated from the following:    Height as of this encounter: 1.54 m (5' 0.63\").    Weight as of this encounter: 159.2 kg (351 lb).  Physical Exam      ASSESSMENT / PLAN:       COUNSELING:    Estimated body mass index is 67.13 kg/m  as calculated from the following:    Height as of this encounter: 1.54 m (5' 0.63\").    Weight as of this encounter: 159.2 kg (351 lb).    Weight management plan: Discussed healthy diet and exercise guidelines    She reports that she has never smoked. She has never used smokeless tobacco.      Appropriate preventive services were discussed with this patient, including applicable screening as appropriate for cardiovascular disease, diabetes, osteopenia/osteoporosis, and glaucoma.  As appropriate for age/gender, discussed screening for colorectal cancer, prostate cancer, breast cancer, and cervical cancer. Checklist reviewing preventive services available has been given to the patient.    Reviewed patients plan of care and provided an AVS. The Complex Care Plan (for patients with higher acuity and needing more deliberate coordination of services) for Mariola meets the Care Plan requirement. This Care Plan has been established and reviewed with the Patient.    Counseling Resources:  ATP IV " Guidelines  Pooled Cohorts Equation Calculator  Breast Cancer Risk Calculator  Breast Cancer: Medication to Reduce Risk  FRAX Risk Assessment  ICSI Preventive Guidelines  Dietary Guidelines for Americans, 2010  USDA's MyPlate  ASA Prophylaxis  Lung CA Screening    Giselle Munoz MD  [facility]    Identified Health Risks:  Answers for HPI/ROS submitted by the patient on 7/14/2022  If you checked off any problems, how difficult have these problems made it for you to do your work, take care of things at home, or get along with other people?: Not difficult at all  PHQ9 TOTAL SCORE: 9        The patient was provided with suggestions to help her develop a healthy physical lifestyle.  She is at risk for lack of exercise and has been provided with information to increase physical activity for the benefit of her well-being.  The patient reports that she has difficulty with activities of daily living. I have asked that the patient make a follow up appointment in 16 weeks where this issue will be further evaluated and addressed.  The patient was provided with suggestions to help her develop a healthy emotional lifestyle.  The patient's PHQ-9 score is consistent with mild depression. She was provided with information regarding depression and was advised to schedule a follow up appointment in 16 weeks to further address this issue.    Seen with DOMINIQUE Gtz. 07/14/22    Physician Attestation   I, Giselle Munoz, saw this patient and have discussed and personally reviewed information outlined in this document.    I agree with the findings and plan of care as documented in the note.      Giselle Munoz MD

## 2022-07-14 NOTE — Clinical Note
Pt has trip planned 8/2 to 8/15 and will be out of town. Please call her pharmacy to help plan and make sure she has enough meds during this time- do they need a 90 day supply rx from me?  06-Jan-2018 23:16

## 2022-07-15 DIAGNOSIS — F33.0 MAJOR DEPRESSIVE DISORDER, RECURRENT EPISODE, MILD (H): ICD-10-CM

## 2022-07-15 LAB — DEPRECATED CALCIDIOL+CALCIFEROL SERPL-MC: 20 UG/L (ref 20–75)

## 2022-07-15 RX ORDER — BUPROPION HYDROCHLORIDE 150 MG/1
TABLET ORAL
Qty: 90 TABLET | Refills: 2 | Status: CANCELLED | OUTPATIENT
Start: 2022-07-15

## 2022-07-15 NOTE — PROGRESS NOTES
"Called and let pharmacy \"rlay message below\". Pt has enough refill for her trip. Don't need to send over 90 days supplies.   "

## 2022-07-15 NOTE — TELEPHONE ENCOUNTER
"Called and let pharmacy \"relay message below\". Pt has enough refill for her trip and don't need to send over the 90 days supply.   "

## 2022-07-19 ENCOUNTER — NURSE TRIAGE (OUTPATIENT)
Dept: NURSING | Facility: CLINIC | Age: 47
End: 2022-07-19

## 2022-07-19 NOTE — TELEPHONE ENCOUNTER
Yes I think Er is ok.  Urgent care could be an option as well.    I don't think we can get her into clinic in a timely fashion.

## 2022-07-19 NOTE — TELEPHONE ENCOUNTER
Nurse Triage SBAR    Is this a 2nd Level Triage? YES, LICENSED PRACTITIONER REVIEW IS REQUIRED    Situation: Patient calling about veritgo since last Thursday. Hard to stand and get to the bathroom.  Needs support.    Background: She talked to her diabetes doctor and he wants her seen.    Assessment: to be seen today    Protocol Recommended Disposition:   Go to ED Now (Or PCP Triage)    Recommendation: provider to recommend     Routed to provider    Does the patient meet one of the following criteria for ADS visit consideration? 16+ years old, with an [facility] PCP     TIP  Providers, please consider if this condition is appropriate for management at one of our Acute and Diagnostic Services sites.     If patient is a good candidate, please use dotphrase <dot>triageresponse and select Refer to ADS to document.    Protocol suggests L2T.          Reason for Disposition    SEVERE dizziness (vertigo) (e.g., unable to walk without assistance)    Additional Information    Negative: [1] Weakness (i.e., paralysis, loss of muscle strength) of the face, arm or leg on one side of the body AND [2] sudden onset AND [3] present now    Negative: [1] Numbness (i.e., loss of sensation) of the face, arm or leg on one side of the body AND [2] sudden onset AND [3] present now    Negative: [1] Loss of speech or garbled speech AND [2] sudden onset AND [3] present now    Negative: Difficult to awaken or acting confused (e.g., disoriented, slurred speech)    Negative: Sounds like a life-threatening emergency to the triager    Negative: Followed a head injury    Negative: Followed an ear injury    Negative: Localized weakness or numbness is main symptom    Negative: Dizziness relates to riding in a car, going to an amusement park, etc.    Negative: [1] Dizziness is main symptom AND [2] NO spinning sensation (i.e., vertigo)    Protocols used: DIZZINESS - VERTIGO-A-

## 2022-07-19 NOTE — TELEPHONE ENCOUNTER
RN attempted to contact patient, but no answer. Left message on patient's voice mail to call clinic back.    Please relay message below from Dr Christian if patient calls back      Moira Price RN  [facility]      Yes I think Er is ok.  Urgent care could be an option as well.    I don't think we can get her into clinic in a timely fashion. Per Dr Christian

## 2022-07-19 NOTE — PROGRESS NOTES
M requested medication list on Friday 7/15/22 from pharmacy to be faxed to fax number [phone]

## 2022-07-19 NOTE — TELEPHONE ENCOUNTER
RN received a call back from patient.     Provider Recommendation Follow Up:   Reached patient/caregiver. Informed of provider's recommendations. Patient verbalized understanding and does not agree with the plan.       Patient stated she does not have a ride and did not want to wait in the hospital for 2 to 3 hours. Patient would rather to be seen at the clinic with .       RN advised patient that if she has any weakness, numbness, or fainting, she needs to call 911. Patient verbalized agreed. RN also advised patient to call the clinic back if she has any questions or concerns.         RN will route this encounter to  to review and advise to see if the provider is willing to DB.           Indigo Webster RN  [facility]

## 2022-07-19 NOTE — TELEPHONE ENCOUNTER
PCP is not in the office.       RN will route this encounter to DOD,  to  review and advise.        Indigo Webster RN  [facility]

## 2022-07-22 RX ORDER — ERGOCALCIFEROL 1.25 MG/1
50000 CAPSULE ORAL WEEKLY
Qty: 12 CAPSULE | Refills: 4 | Status: SHIPPED | OUTPATIENT
Start: 2022-07-22

## 2022-08-30 DIAGNOSIS — F33.0 MAJOR DEPRESSIVE DISORDER, RECURRENT EPISODE, MILD (H): ICD-10-CM

## 2022-08-30 NOTE — TELEPHONE ENCOUNTER
"Routing refill request to provider for review/approval because:  PHQ9 score - greater than 4.  Pregnancy warning    Last Written Prescription Date:  11/10/21  Last Fill Quantity: 90,  # refills: 2   Last office visit provider:  7/14/22     Requested Prescriptions   Pending Prescriptions Disp Refills     buPROPion (WELLBUTRIN XL) 150 MG 24 hr tablet [Pharmacy Med Name: BUPROPION XL 150MG TABLETS (24 H)] 90 tablet 2     Sig: TAKE 1 TABLET(150 MG) BY MOUTH DAILY       SSRIs Protocol Failed - 8/30/2022 12:46 PM        Failed - PHQ-9 score less than 5 in past 6 months     Please review last PHQ-9 score.           Failed - No active pregnancy on record        Passed - Medication is Bupropion     If the medication is Bupropion (Wellbutrin), and the patient is taking for smoking cessation; OK to refill.          Passed - Medication is active on med list        Passed - Patient is age 18 or older        Passed - No positive pregnancy test in last 12 months        Passed - Recent (6 mo) or future (30 days) visit within the authorizing provider's specialty     Patient had office visit in the last 6 months or has a visit in the next 30 days with authorizing provider or within the authorizing provider's specialty.  See \"Patient Info\" tab in inbasket, or \"Choose Columns\" in Meds & Orders section of the refill encounter.                 Tom Boogie RN 08/30/22 12:46 PM  "

## 2022-08-31 RX ORDER — BUPROPION HYDROCHLORIDE 150 MG/1
TABLET ORAL
Qty: 90 TABLET | Refills: 1 | Status: SHIPPED | OUTPATIENT
Start: 2022-08-31

## 2022-09-08 ENCOUNTER — NURSE TRIAGE (OUTPATIENT)
Dept: NURSING | Facility: CLINIC | Age: 47
End: 2022-09-08

## 2022-09-08 NOTE — TELEPHONE ENCOUNTER
Patient states her blood sugars have been high.  She did have a low this this morning ( around 90) but the rest the last couple days are high.  Right now at 6pm patients BG is 578.  Patient was difficult to understand on the phone.  She denies any weakness, confusion or difficulty to stay awake.    Patient has not vomited but states her heart and breathing is fast.    I explained to patient she needs to go to ED now.  Patient states she is in [facility], I said find the nearest ED.  Someone else was on the phone with patient.  They said they knew where the closest ED was and was going to take her there now.    Yuko Jensen RN   09/08/22 6:52 PM  [facility] Nurse Advisor    Reason for Disposition    [1] Blood glucose > 240 mg/dL (13.3 mmol/L) AND [2] rapid breathing    Additional Information    Negative: Unconscious or difficult to awaken    Negative: Acting confused (e.g., disoriented, slurred speech)    Negative: Very weak (e.g., can't stand)    Negative: Sounds like a life-threatening emergency to the triager    Negative: [1] Vomiting AND [2] signs of dehydration (e.g., very dry mouth, lightheaded, dark urine)    Protocols used: DIABETES - HIGH BLOOD SUGAR-A-

## 2022-09-12 ENCOUNTER — TELEPHONE (OUTPATIENT)
Dept: FAMILY MEDICINE | Facility: CLINIC | Age: 47
End: 2022-09-12

## 2022-09-12 NOTE — TELEPHONE ENCOUNTER
Reason for Call:  Other FYI    Detailed comments: Elaine, nurse from [facility]. Elaine says pt is set up for PCA services every other month. Last month pt did not receive any PCA because she was out of town. Also pt is still not back from out of town yet so don't know if pt will get PCA service this month since she miss last month. Any questions, please call Elaine at [phone]. Thank you!    Phone Number Patient can be reached at: Home number on file [phone] (home)    Best Time: Any    Can we leave a detailed message on this number? YES    Call taken on 9/12/2022 at 4:05 PM by Brii Dumont

## 2022-09-14 ENCOUNTER — TELEPHONE (OUTPATIENT)
Dept: FAMILY MEDICINE | Facility: CLINIC | Age: 47
End: 2022-09-14

## 2022-09-14 NOTE — TELEPHONE ENCOUNTER
Patient calling and wanting Dr Munoz to change dosing on diabetic medications.  [facility]    Patient will call there and hung up    No further action needed.    Moira Price RN  [facility]    [address]    [place] [address]    [phone]   Shmuel Lyon MD

## 2022-10-14 ENCOUNTER — TELEPHONE (OUTPATIENT)
Dept: FAMILY MEDICINE | Facility: CLINIC | Age: 47
End: 2022-10-14

## 2022-10-14 NOTE — TELEPHONE ENCOUNTER
Patient called to let provider know that this form is due on the 1st of November. Please fill it out and fax to .

## 2022-10-14 NOTE — TELEPHONE ENCOUNTER
Pt mailed in a prescribed diet form that needs to be filled out by doctor and faxed to her , pt also wrote a note to have a copy mailed to her.

## 2022-10-17 ENCOUNTER — MEDICAL CORRESPONDENCE (OUTPATIENT)
Dept: HEALTH INFORMATION MANAGEMENT | Facility: CLINIC | Age: 47
End: 2022-10-17

## 2022-11-10 NOTE — TELEPHONE ENCOUNTER
Pt called in to check the status of form. I informed pt that forms were received, completed, faxed and copy was mailed to the home.

## 2023-01-26 ENCOUNTER — TELEPHONE (OUTPATIENT)
Dept: FAMILY MEDICINE | Facility: CLINIC | Age: 48
End: 2023-01-26

## 2023-01-26 NOTE — TELEPHONE ENCOUNTER
Patient was informed of message, she is currently in [place], [place] and will not be home till August to do this. PHILIP

## 2023-01-26 NOTE — TELEPHONE ENCOUNTER
I last saw pt 7/14/22 for a health maintenance visit / AWV    We reivewed colon cancer screening options and she said she was willing to do the stool test- this was ordered on this date    Note sure about the timing and why she is calling about this now or why this was sent now

## 2023-01-26 NOTE — TELEPHONE ENCOUNTER
RN received a call from patient regarding a test.       Patient asked why she received the cologuard test kit. Patient wants to know if Dr. Munoz ordered that.       Per patient, if there is any order or test, please call patient, so she is aware of it. Patient can be reached at [phone].      RN will route this encounter to  to review and advise.          Indigo Webster RN  [facility]

## 2023-05-16 ENCOUNTER — TELEPHONE (OUTPATIENT)
Dept: FAMILY MEDICINE | Facility: CLINIC | Age: 48
End: 2023-05-16
Payer: MEDICARE

## 2023-05-16 NOTE — TELEPHONE ENCOUNTER
General Call    Contacts       Type Contact Phone/Fax    05/16/2023 02:06 PM CDT Phone (Incoming) Kajal Janet MARYLIN (Self) [phone] (H)        Reason for Call:  Patient requesting to have records transferred to a new provider    What are your questions or concerns:  Need PRITI / Medical records    Date of last appointment with provider: 7/14/2022 with Dr Munoz    Gave patient phone number to PRITI/ Medical records  No further action needed.    Moira Price RN  [facility]

## 2024-06-27 NOTE — ANESTHESIA CARE TRANSFER NOTE
Last vitals:   Vitals:    05/29/19 1224   BP: 110/54   Pulse: (!) 112   Resp: 20   Temp: (!) 2.6  C (36.6  F)   SpO2: 99%     Patient's level of consciousness is awake and drowsy  Spontaneous respirations: yes  Maintains airway independently: yes  Dentition unchanged: yes  Oropharynx: oropharynx clear of all foreign objects    QCDR Measures:  ASA# 20 - Surgical Safety Checklist: WHO surgical safety checklist completed prior to induction    PQRS# 430 - Adult PONV Prevention: NA - Not adult patient, not GA or 3 or more risk factors NOT present  ASA# 8 - Peds PONV Prevention: NA - Not pediatric patient, not GA or 2 or more risk factors NOT present  PQRS# 424 - Kym-op Temp Management: 4559F - At least one body temp DOCUMENTED => 35.5C or 95.9F within required timeframe  PQRS# 426 - PACU Transfer Protocol: - Transfer of care checklist used  ASA# 14 - Acute Post-op Pain: ASA14B - Patient did NOT experience pain >= 7 out of 10   Detail Level: Detailed